# Patient Record
Sex: MALE | Race: WHITE | NOT HISPANIC OR LATINO | Employment: FULL TIME
[De-identification: names, ages, dates, MRNs, and addresses within clinical notes are randomized per-mention and may not be internally consistent; named-entity substitution may affect disease eponyms.]

---

## 2017-08-12 ENCOUNTER — HEALTH MAINTENANCE LETTER (OUTPATIENT)
Age: 37
End: 2017-08-12

## 2018-07-02 ENCOUNTER — APPOINTMENT (OUTPATIENT)
Dept: CT IMAGING | Facility: CLINIC | Age: 38
End: 2018-07-02
Attending: EMERGENCY MEDICINE
Payer: COMMERCIAL

## 2018-07-02 ENCOUNTER — HOSPITAL ENCOUNTER (EMERGENCY)
Facility: CLINIC | Age: 38
Discharge: HOME | End: 2018-07-02
Attending: EMERGENCY MEDICINE | Admitting: EMERGENCY MEDICINE
Payer: COMMERCIAL

## 2018-07-02 VITALS
OXYGEN SATURATION: 96 % | BODY MASS INDEX: 32.93 KG/M2 | DIASTOLIC BLOOD PRESSURE: 89 MMHG | RESPIRATION RATE: 16 BRPM | WEIGHT: 204 LBS | SYSTOLIC BLOOD PRESSURE: 141 MMHG | TEMPERATURE: 98.4 F | HEART RATE: 62 BPM

## 2018-07-02 DIAGNOSIS — S09.90XA CLOSED HEAD INJURY, INITIAL ENCOUNTER: ICD-10-CM

## 2018-07-02 DIAGNOSIS — S13.9XXA NECK SPRAIN, INITIAL ENCOUNTER: ICD-10-CM

## 2018-07-02 PROCEDURE — 99284 EMERGENCY DEPT VISIT MOD MDM: CPT | Mod: 25

## 2018-07-02 PROCEDURE — 70450 CT HEAD/BRAIN W/O DYE: CPT

## 2018-07-02 ASSESSMENT — ENCOUNTER SYMPTOMS
NECK PAIN: 1
NUMBNESS: 0
WOUND: 1
HEADACHES: 1

## 2018-07-02 NOTE — ED PROVIDER NOTES
"  History     Chief Complaint:  Head Injury    HPI   Roseanna Dorsey is a 38 year old female who presents to the emergency department today for evaluation of a head injury. The patient reports that she was in [place] on 6/27/18 and was doing some \"off the beaten path\" hiking, when she slipped an fell, hitting the back of her head. She denies loss of consciousness on impact but reports that she obtained an abrasion to the area. Since then she notes a headache, neck pain exacerbated with movement, and overall tenderness to a bump on the back of her head that has decreased in size since onset. She states that she has been utilizing ibuprofen for her pain, but reports that this dulls but does not completely alleviate the pain. After spending time at work looking at screens, the patient states that her headache worsened, prompting her to present to the ED.  She denies numbness in her fingers.     Allergies:   Penicillins  Sulfa Drugs     Medications:    Albuterol    Past Medical History:    Anemia    Past Surgical History:    Cholecystectomy  Dilation and Curettage Suction     Family History:    Family history reviewed. No pertinent family history.    Social History:  The patient was accompanied to the ED by her child.  Smoking Status: Never Smoker  Smokeless Tobacco: Never Used  Alcohol Use: Negative  Marital Status:        Review of Systems   Musculoskeletal: Positive for neck pain.   Skin: Positive for wound.   Neurological: Positive for headaches. Negative for numbness.        No loss of consciousness   All other systems reviewed and are negative.  Left posterior scalp pain.  Mild neck pain.  No paresthesias in the hands/arms.    Physical Exam     Patient Vitals for the past 24 hrs:   BP Temp Temp src Pulse Resp SpO2 Weight   07/02/18 1922 - - - - 16 - -   07/02/18 1751 141/89 98.4  F (36.9  C) Oral 62 16 96 % 92.5 kg (204 lb)       Physical Exam   HENT:   Head:         GEN: patient smiling, no " distress  HEAD: atraumatic, normocephalic  EYES: pupils reactive, conjunctivae normal  ENT: TMs flat and white bilaterally, oropharynx normal with no erythema or exudate, mucus membranes moist  NECK: no posterior midline tenderness, paraspinous neck cervical tenderness  RESPIRATORY: no tachypnea, breath sounds clear to auscultation (no rales, wheezes, rhonchi), chest wall nontender, normal phonation  CVS: normal S1/S2, no murmurs/rubs/gallops  ABDOMEN: soft, nontender, no masses or organomegaly, no rebound, positive bowel sounds  BACK: no spinal tenderness  EXTREMITIES: intact pulses x 2 (radial pulses intact), no edema  MUSCULOSKELETAL: no deformities  SKIN: warm and dry  NEURO:   Overall symmetrical exam.  Cranial nerves intact.   5/5.  DF and PF 5/5.  Lifts arms over head.  Distal upper extremity sensation intact to PP and LT.  Reflexes 2plus at the elbow.  HEME: no bruising or petechiae/contusions  LYMPH: no lymphadenopathy    Emergency Department Course     Imaging:  Radiology findings were communicated with the patient who voiced understanding of the findings.    CT Head w/o Contrast  Normal head CT.  Radiation dose for this scan was reduced using automated exposure  control, adjustment of the mA and/or kV according to patient size, or  iterative reconstruction technique.    Reading per radiology    Emergency Department Course:    1748 Nursing notes and vitals reviewed.    1805 I performed an exam of the patient as documented above.     1831 The patient was sent for a CT of the head while in the emergency department, results above.     1922 I personally reviewed the imaging results with the patient and answered all related questions prior to discharge.    Discussed results with patient.  Gave patient copies of results (applicable labs, CT scans and/or ultrasound).  Answered questions.  Asked patient to followup with PCP.    /89  Pulse 62  Temp 98.4  F (36.9  C) (Oral)  Resp 16  Wt 92.5 kg (204  "lb)  SpO2 96%  BMI 32.93 kg/m2      Impression & Plan      Medical Decision Making:  This patient presents with a history and clinical exam consistent with a head injury, which is a clinical diagnosis. The differential diagnosis includes skull fracture, epidural hematoma, subdural hematoma, intracerebral hemorrhage, and traumatic subarachnoid hemorrhage; these diagnoses were not detected during this visit on CT imaging.  Despite the normal neuroimaging,  the patient/family understands that they must return if any \"red flags\" appear/develop in the coming hours/days, as this may represent an indication to perform another CT scan.  I have noted that \"red flags\" include: headaches that get worse, increased drowsiness, strange behavior, repetitive speech, seizures, repeated vomiting, growing confusion, increased irritability, slurred speech, weakness or numbness, and loss of responsiveness. Although rare, delayed CNS bleeds can occur, and the patient was notified of this.   I have discussed the second impact syndrome, and the importance of not sustaining repeated concussions in the next 1-2 weeks.  Post concussive syndrome is also discussed.  Concussion information will also be provided in writing at discharge detailing this.    CT is negative for any sort of skull fracture or intracranial hemorrhage. The patient did not have any midline neck tenderness and therefore imaging was not indicated. She understands that she will continue to have headaches, and this can be the natural course of a concussion. She should hydrate and push fluids and use motrin and tylenol as needed. She is given copies of her studies.     No midline spinal cervical tenderness to suggest need for CT imaging.  Patient is neurologically intact.  Plan for ice and hydration.  Concussion precautions.    Diagnosis:    ICD-10-CM    1. Closed head injury, initial encounter S09.90XA    2. Neck sprain, initial encounter S13.9XXA      Disposition:   The " "patient is discharged to home.    Discharge Medications:  No discharge medications.    Instructions to patient:  Return if any \"red flags\" appear/develop in the coming hours/days, as this may represent an indication to perform a CT scan.  \"Red flags\" include: headaches that get worse, increased drowsiness, strange behavior, repetitive speech, seizures, repeated vomiting, growing confusion, increased irritability, slurred speech, weakness or numbness, and loss of responsiveness.      OK motrin or tylenol for headache.    Hydrate and push fluids.    Motrin (ibuprofen) or tylenol (acetaminophen) as needed for pain.    Post concussive syndrome involves headaches, dizziness, personality changes, confusion, tiredness/lethargy.  This is a normal part of the healing process.    Frequent breaks and extra time to complete projects/work/school may be necessary.    Scribe Disclosure:  I, Rhea Eduardo, am serving as a scribe at 5:56 PM on 7/2/2018 to document services personally performed by Maria Del Carmen Fajardo MD based on my observations and the provider's statements to me.    [facility] EMERGENCY DEPARTMENT       Maria Del Carmen Fajardo MD  07/02/18 2200    "

## 2018-07-02 NOTE — ED AVS SNAPSHOT
[facility] Emergency Department    [address] [place] [address]    Phone:  [phone]    Fax:  [phone]                                       Roseanna Dorsey   MRN: [id number]    Department:  [facility] Emergency Department   Date of Visit:  7/2/2018           Patient Information     Date Of Birth          1980        Your diagnoses for this visit were:     Closed head injury, initial encounter     Neck sprain, initial encounter        You were seen by Maria Del Carmen Fajardo MD.      Follow-up Information     Follow up with Wilda Whitfield MD.    Specialty:  OB/Gyn    Contact information:    [address]  [place] [address] [phone]        Discharge References/Attachments     CONCUSSION, COPING WITH (ENGLISH)    CONCUSSION, AFTER (ENGLISH)    HEAD INJURY, NO WAKE-UP (ADULT) (ENGLISH)      24 Hour Appointment Hotline       To make an appointment at any [facility] clinic, call [phone] ([phone]). If you don't have a family doctor or clinic, we will help you find one. [facility] clinics are conveniently located to serve the needs of you and your family.             Review of your medicines      Our records show that you are taking the medicines listed below. If these are incorrect, please call your family doctor or clinic.        Dose / Directions Last dose taken    albuterol 108 (90 Base) MCG/ACT Inhaler   Commonly known as:  PROAIR HFA/PROVENTIL HFA/VENTOLIN HFA   Dose:  2 puff   Quantity:  1 Inhaler        Inhale 2 puffs into the lungs every 6 hours as needed for shortness of breath / dyspnea or wheezing   Refills:  0                Procedures and tests performed during your visit     CT Head w/o Contrast      Orders Needing Specimen Collection     None      Pending Results     Date and Time Order Name Status Description    7/2/2018 1812 CT Head w/o Contrast Preliminary             Pending Culture Results     No orders found  from 6/30/2018 to 7/3/2018.            Pending Results Instructions     If you had any lab results that were not finalized at the time of your Discharge, you can call the ED Lab Result RN at [phone]. You will be contacted by this team for any positive Lab results or changes in treatment. The nurses are available 7 days a week from 10A to 6:30P.  You can leave a message 24 hours per day and they will return your call.        Test Results From Your Hospital Stay        7/2/2018  6:50 PM      Narrative     CT OF THE HEAD WITHOUT CONTRAST 7/2/2018 6:37 PM     COMPARISON: Head CT 4/12/2011.    HISTORY:  Fell backwards hit head, left posterior area with  tenderness.     TECHNIQUE: Axial CT images of the head from the skull base to the  vertex were acquired without IV contrast.    FINDINGS: The ventricles and basal cisterns are within normal limits  in configuration. There is no midline shift. There are no extra-axial  fluid collections.  Gray-white differentiation is well maintained.    No intracranial hemorrhage, mass or recent infarct.    The visualized paranasal sinuses are well-aerated. There is no  mastoiditis. There are no fractures of the visualized bones.        Impression     IMPRESSION:  Normal head CT.    Radiation dose for this scan was reduced using automated exposure  control, adjustment of the mA and/or kV according to patient size, or  iterative reconstruction technique.                  Clinical Quality Measure: Blood Pressure Screening     Your blood pressure was checked while you were in the emergency department today. The last reading we obtained was  BP: 141/89 . Please read the guidelines below about what these numbers mean and what you should do about them.  If your systolic blood pressure (the top number) is less than 120 and your diastolic blood pressure (the bottom number) is less than 80, then your blood pressure is normal. There is nothing more that you need to do about it.  If your  "systolic blood pressure (the top number) is 120-139 or your diastolic blood pressure (the bottom number) is 80-89, your blood pressure may be higher than it should be. You should have your blood pressure rechecked within a year by a primary care provider.  If your systolic blood pressure (the top number) is 140 or greater or your diastolic blood pressure (the bottom number) is 90 or greater, you may have high blood pressure. High blood pressure is treatable, but if left untreated over time it can put you at risk for heart attack, stroke, or kidney failure. You should have your blood pressure rechecked by a primary care provider within the next 4 weeks.  If your provider in the emergency department today gave you specific instructions to follow-up with your doctor or provider even sooner than that, you should follow that instruction and not wait for up to 4 weeks for your follow-up visit.        Thank you for choosing [facility]       Thank you for choosing [facility] for your care. Our goal is always to provide you with excellent care. Hearing back from our patients is one way we can continue to improve our services. Please take a few minutes to complete the written survey that you may receive in the mail after you visit with us. Thank you!        [employer] Information     [employer] lets you send messages to your doctor, view your test results, renew your prescriptions, schedule appointments and more. To sign up, go to [email or web address] . Click on \"Log in\" on the left side of the screen, which will take you to the Welcome page. Then click on \"Sign up Now\" on the right side of the page.     You will be asked to enter the access code listed below, as well as some personal information. Please follow the directions to create your username and password.     Your access code is: [email or web address]  Expires: 2018  7:17 PM     Your access code will  in 90 days. If you need help or a new code, please call your [facility] " [facility] or [phone].        Care EveryWhere ID     This is your Care EveryWhere ID. This could be used by other organizations to access your [facility] medical records  [id number]        Equal Access to Services     FREDERIC LOPEZ : Ailin Jacobo, wapolyda lualyssaadaha, qaybta kaalmada latasharosazoltan, snehal luna. So [facility] [place] [phone].    ATENCIÓN: Si habla español, tiene a moura disposición servicios gratuitos de asistencia lingüística. Llame al [phone].    We comply with applicable federal civil rights laws and [place] laws. We do not discriminate on the basis of race, color, national origin, age, disability, sex, sexual orientation, or gender identity.            After Visit Summary       This is your record. Keep this with you and show to your community pharmacist(s) and doctor(s) at your next visit.

## 2018-07-02 NOTE — ED NOTES
Patient presents for complaint of a head injury sustained on Wednesday while she was on a cruise. States she was on an excursion off the ship when she slipped and struck her head on a rock. Denies LOC at that time. States since then she has been getting headaches and complains of neck pain. Tenderness to lower cervical spine with palpation, denies neurologic complaints. ABC intact, A&Ox4.

## 2018-07-02 NOTE — ED AVS SNAPSHOT
[facility] Emergency Department    [address]    [facility] [address]    Phone:  [phone]    Fax:  [phone]                                       Roseanna Dorsey   MRN: [id number]    Department:  [facility] Emergency Department   Date of Visit:  7/2/2018           After Visit Summary Signature Page     I have received my discharge instructions, and my questions have been answered. I have discussed any challenges I see with this plan with the nurse or doctor.    ..........................................................................................................................................  Patient/Patient Representative Signature      ..........................................................................................................................................  Patient Representative Print Name and Relationship to Patient    ..................................................               ................................................  Date                                            Time    ..........................................................................................................................................  Reviewed by Signature/Title    ...................................................              ..............................................  Date                                                            Time

## 2018-07-03 NOTE — DISCHARGE INSTRUCTIONS
"Return if any \"red flags\" appear/develop in the coming hours/days, as this may represent an indication to perform a CT scan.  \"Red flags\" include: headaches that get worse, increased drowsiness, strange behavior, repetitive speech, seizures, repeated vomiting, growing confusion, increased irritability, slurred speech, weakness or numbness, and loss of responsiveness.      OK motrin or tylenol for headache.    Hydrate and push fluids.    Motrin (ibuprofen) or tylenol (acetaminophen) as needed for pain.    Post concussive syndrome involves headaches, dizziness, personality changes, confusion, tiredness/lethargy.  This is a normal part of the healing process.    Frequent breaks and extra time to complete projects/work/school may be necessary.  "

## 2019-01-29 ENCOUNTER — HOSPITAL ENCOUNTER (EMERGENCY)
Facility: CLINIC | Age: 39
Discharge: HOME | End: 2019-01-29
Attending: PHYSICIAN ASSISTANT | Admitting: PHYSICIAN ASSISTANT
Payer: COMMERCIAL

## 2019-01-29 VITALS
TEMPERATURE: 97.1 F | SYSTOLIC BLOOD PRESSURE: 131 MMHG | BODY MASS INDEX: 31.47 KG/M2 | RESPIRATION RATE: 16 BRPM | HEART RATE: 98 BPM | DIASTOLIC BLOOD PRESSURE: 92 MMHG | OXYGEN SATURATION: 100 % | WEIGHT: 195 LBS

## 2019-01-29 DIAGNOSIS — R11.10 VOMITING AND DIARRHEA: ICD-10-CM

## 2019-01-29 DIAGNOSIS — R19.7 VOMITING AND DIARRHEA: ICD-10-CM

## 2019-01-29 LAB
ALBUMIN SERPL-MCNC: 4.6 G/DL (ref 3.4–5)
ALBUMIN UR-MCNC: NEGATIVE MG/DL
ALP SERPL-CCNC: 77 U/L (ref 40–150)
ALT SERPL W P-5'-P-CCNC: 46 U/L (ref 0–50)
ANION GAP SERPL CALCULATED.3IONS-SCNC: 12 MMOL/L (ref 3–14)
APPEARANCE UR: CLEAR
AST SERPL W P-5'-P-CCNC: 32 U/L (ref 0–45)
BASOPHILS # BLD AUTO: 0.1 10E9/L (ref 0–0.2)
BASOPHILS NFR BLD AUTO: 0.3 %
BILIRUB SERPL-MCNC: 0.7 MG/DL (ref 0.2–1.3)
BILIRUB UR QL STRIP: NEGATIVE
BUN SERPL-MCNC: 19 MG/DL (ref 7–30)
CALCIUM SERPL-MCNC: 9.8 MG/DL (ref 8.5–10.1)
CHLORIDE SERPL-SCNC: 102 MMOL/L (ref 94–109)
CO2 SERPL-SCNC: 22 MMOL/L (ref 20–32)
COLOR UR AUTO: YELLOW
CREAT SERPL-MCNC: 0.92 MG/DL (ref 0.52–1.04)
DIFFERENTIAL METHOD BLD: ABNORMAL
EOSINOPHIL # BLD AUTO: 0.1 10E9/L (ref 0–0.7)
EOSINOPHIL NFR BLD AUTO: 0.7 %
ERYTHROCYTE [DISTWIDTH] IN BLOOD BY AUTOMATED COUNT: 12.1 % (ref 10–15)
GFR SERPL CREATININE-BSD FRML MDRD: 79 ML/MIN/{1.73_M2}
GLUCOSE SERPL-MCNC: 130 MG/DL (ref 70–99)
GLUCOSE UR STRIP-MCNC: NEGATIVE MG/DL
HCT VFR BLD AUTO: 47.6 % (ref 35–47)
HGB BLD-MCNC: 15.9 G/DL (ref 11.7–15.7)
HGB UR QL STRIP: NEGATIVE
IMM GRANULOCYTES # BLD: 0.1 10E9/L (ref 0–0.4)
IMM GRANULOCYTES NFR BLD: 0.3 %
KETONES UR STRIP-MCNC: NEGATIVE MG/DL
LEUKOCYTE ESTERASE UR QL STRIP: NEGATIVE
LYMPHOCYTES # BLD AUTO: 0.5 10E9/L (ref 0.8–5.3)
LYMPHOCYTES NFR BLD AUTO: 3 %
MCH RBC QN AUTO: 29 PG (ref 26.5–33)
MCHC RBC AUTO-ENTMCNC: 33.4 G/DL (ref 31.5–36.5)
MCV RBC AUTO: 87 FL (ref 78–100)
MONOCYTES # BLD AUTO: 0.8 10E9/L (ref 0–1.3)
MONOCYTES NFR BLD AUTO: 5.2 %
MUCOUS THREADS #/AREA URNS LPF: PRESENT /LPF
NEUTROPHILS # BLD AUTO: 14 10E9/L (ref 1.6–8.3)
NEUTROPHILS NFR BLD AUTO: 90.5 %
NITRATE UR QL: NEGATIVE
NRBC # BLD AUTO: 0 10*3/UL
NRBC BLD AUTO-RTO: 0 /100
PH UR STRIP: 5 PH (ref 5–7)
PLATELET # BLD AUTO: 327 10E9/L (ref 150–450)
POTASSIUM SERPL-SCNC: 4.7 MMOL/L (ref 3.4–5.3)
PROT SERPL-MCNC: 9.5 G/DL (ref 6.8–8.8)
RBC # BLD AUTO: 5.48 10E12/L (ref 3.8–5.2)
RBC #/AREA URNS AUTO: <1 /HPF (ref 0–2)
SODIUM SERPL-SCNC: 136 MMOL/L (ref 133–144)
SOURCE: ABNORMAL
SP GR UR STRIP: 1.02 (ref 1–1.03)
SQUAMOUS #/AREA URNS AUTO: 1 /HPF (ref 0–1)
UROBILINOGEN UR STRIP-MCNC: 0 MG/DL (ref 0–2)
WBC # BLD AUTO: 15.5 10E9/L (ref 4–11)
WBC #/AREA URNS AUTO: 1 /HPF (ref 0–5)

## 2019-01-29 PROCEDURE — 81001 URINALYSIS AUTO W/SCOPE: CPT | Performed by: PHYSICIAN ASSISTANT

## 2019-01-29 PROCEDURE — 96361 HYDRATE IV INFUSION ADD-ON: CPT

## 2019-01-29 PROCEDURE — 85025 COMPLETE CBC W/AUTO DIFF WBC: CPT | Performed by: PHYSICIAN ASSISTANT

## 2019-01-29 PROCEDURE — 99284 EMERGENCY DEPT VISIT MOD MDM: CPT | Mod: 25

## 2019-01-29 PROCEDURE — 80053 COMPREHEN METABOLIC PANEL: CPT | Performed by: PHYSICIAN ASSISTANT

## 2019-01-29 PROCEDURE — 25000128 H RX IP 250 OP 636: Performed by: PHYSICIAN ASSISTANT

## 2019-01-29 PROCEDURE — 96374 THER/PROPH/DIAG INJ IV PUSH: CPT

## 2019-01-29 RX ORDER — ONDANSETRON 4 MG/1
4 TABLET, ORALLY DISINTEGRATING ORAL EVERY 8 HOURS PRN
Qty: 10 TABLET | Refills: 0 | Status: SHIPPED | OUTPATIENT
Start: 2019-01-29 | End: 2019-02-01

## 2019-01-29 RX ORDER — ONDANSETRON 2 MG/ML
8 INJECTION INTRAMUSCULAR; INTRAVENOUS ONCE
Status: COMPLETED | OUTPATIENT
Start: 2019-01-29 | End: 2019-01-29

## 2019-01-29 RX ADMIN — SODIUM CHLORIDE 1000 ML: 9 INJECTION, SOLUTION INTRAVENOUS at 12:05

## 2019-01-29 RX ADMIN — ONDANSETRON 8 MG: 2 INJECTION INTRAMUSCULAR; INTRAVENOUS at 12:06

## 2019-01-29 ASSESSMENT — ENCOUNTER SYMPTOMS
LIGHT-HEADEDNESS: 1
DYSURIA: 0
DIFFICULTY URINATING: 0
HEMATURIA: 0
DIZZINESS: 1
DIARRHEA: 1
FREQUENCY: 0
ABDOMINAL PAIN: 0
NAUSEA: 1
VOMITING: 1

## 2019-01-29 NOTE — ED NOTES
Patient denies nausea or vomiting since medications given. Patient complains of chills and was given warm blankets and given ice chips upon request.

## 2019-01-29 NOTE — ED AVS SNAPSHOT
[facility] Emergency Department  [address]  [facility] [address]  Phone:  [phone]  Fax:  [phone]                                    Roseanna Dorsey   MRN: [id number]    Department:  [facility] Emergency Department   Date of Visit:  1/29/2019           After Visit Summary Signature Page    I have received my discharge instructions, and my questions have been answered. I have discussed any challenges I see with this plan with the nurse or doctor.    ..........................................................................................................................................  Patient/Patient Representative Signature      ..........................................................................................................................................  Patient Representative Print Name and Relationship to Patient    ..................................................               ................................................  Date                                   Time    ..........................................................................................................................................  Reviewed by Signature/Title    ...................................................              ..............................................  Date                                               Time          22EPIC Rev 08/18

## 2019-01-29 NOTE — ED PROVIDER NOTES
History     Chief Complaint:  Nausea, Vomiting & Diarrhea    HPI   Roseanna Dorsey is a 38 year old female who presents with nausea, vomiting and diarrhea. The patient reports that this morning she started to feel nauseous and has had multiple episodes of vomiting and diarrhea since then. The patient additionally notes that she now has started to feel dizzy and lightheaded. The patient denies any abdominal pain, blood in her vomit or stool or any urinary symptoms. Of note, the patient states that her children have been sick with similar symptoms for the past 5 days.     Allergies:  Penicillins  Sulfa Drugs     Medications:    Albuterol Inhaler    Past Medical History:    Anemia    Past Surgical History:    Cholecystectomy  Dilation and Curettage Suction, Treat Incomplete     Family History:    History reviewed. No pertinent family history.     Social History:  Smoking Status: Never Smoker  Alcohol Use: No  Patient presents with friend.family.  Marital Status:       Review of Systems   Gastrointestinal: Positive for diarrhea, nausea and vomiting. Negative for abdominal pain.   Genitourinary: Negative for difficulty urinating, dysuria, frequency, hematuria and urgency.   Neurological: Positive for dizziness and light-headedness.   All other systems reviewed and are negative.    Physical Exam     Patient Vitals for the past 24 hrs:   BP Temp Temp src Pulse Heart Rate Resp SpO2 Weight   19 1415 (!) 131/92 -- -- 98 -- -- 99 % --   19 1400 130/84 -- -- 92 -- -- 96 % --   19 1330 129/86 -- -- 97 -- -- 96 % --   19 1300 112/74 -- -- 81 -- -- 94 % --   19 1245 131/83 -- -- 91 -- -- 93 % --   19 1230 130/89 -- -- 58 -- -- 98 % --   19 1215 120/90 -- -- 71 -- -- -- --   19 1130 130/87 -- -- -- -- -- -- --   19 1114 (!) 115/93 97.1  F (36.2  C) Temporal -- 106 16 98 % 88.5 kg (195 lb)     Physical Exam  Constitutional: ill appearing  Head: No  external signs of trauma noted to head or face.   Eyes: Pupils are equal, round, and reactive to light. no scleral icterus.   ENT: MMM. Normal voice.   Neck: Normal ROM.    Cardiovascular: mild tachycardia, regular rhythm, and intact distal pulses.    Respiratory: Effort normal. No respiratory distress. Lungs clear to auscultation bilaterally.   GI: Soft. There is no tenderness. There is no rebound or guarding.   Musculoskeletal: No deformities appreciated. Normal ROM. No edema noted.  Neurological: Alert and Oriented x 3. Speech normal. Moves all extremities equally.  Psychiatric: Appropriate mood, affect, and behavior.   Skin: Skin is warm and dry. Pale appearing.     Emergency Department Course     Laboratory:    CMP: Glucose 130 (H), Protein Total 9.5 (H), o/w AWNL (Creatinine 0.92)    CBC: WBC 15.5 (H), HGB 15.9 (H), RBC 5.48 (H), HCT 47.6 (H), o/w AWNL ()    UA: Mucous Urine Present (A), o/w Negative    Interventions:    1205: NS 1L IV Bolus  1206: Zofran 8 mg IV  NS 1L IV Bolus    Emergency Department Course:  Past medical records, nursing notes, and vitals reviewed.  1133: I performed an exam of the patient and obtained history, as documented above.    IV inserted and blood drawn.     1518: I rechecked the patient. Findings and plan explained to the Patient. Patient discharged home with instructions regarding supportive care, medications, and reasons to return. The importance of close follow-up was reviewed.     Impression & Plan      Medical Decision Making:  Roseanna Dorsey is a 38 year old female who presents with acute nausea, vomiting and diarrhea. She has a benign abdominal exam without focal RLQ or LLQ tenderness to suggest diverticulitis or appendicitis, respectively, or other acute abdominal process. I did discuss the sometimes vague initial constellation of symptoms early in the course of acute abdominal processes, and the need for immediate return should pain or other concerning  symptoms develop.  Symptoms are improved after IV fluids and symptomatic treatment.  The patient is not pregnant and there is no evidence of urinary tract infection. There has been no travel or antibiotic exposure to suggest more concerning cause of diarrhea, and there has been no hematemesis or BRBPR/melena.  Vital signs have remained normal and stable throughout the ED course, and the abdominal re-exam remains benign. She is tolerating PO now. I believe she is safe for discharge in improved condition at this time with strict return precautions for recurrent vomiting, pain, fever or any other concerning symptoms.    Diagnosis:    ICD-10-CM    1. Vomiting and diarrhea R11.10     R19.7        Disposition:  Discharged to home.    Discharge Medications:     Medication List      Started    ondansetron 4 MG ODT tab  Commonly known as:  ZOFRAN ODT  4 mg, Oral, EVERY 8 HOURS PRN              Anupama Villavicencio  1/29/2019   [facility] EMERGENCY DEPARTMENT  Anupama MORA, am serving as a scribe at 11:33 AM on 1/29/2019 to document services personally performed by Adamaris Valenzuela PA-C based on my observations and the provider's statements to me.        Adamaris Valenzuela PA-C  01/29/19 1748

## 2019-01-29 NOTE — ED TRIAGE NOTES
Patient presents with nausea, vomiting and diarrhea since this morning. Patient hasn't been able to keep anything down. ABCDs intact, alert and oriented x 4.

## 2019-10-28 ENCOUNTER — PATIENT OUTREACH (OUTPATIENT)
Dept: PLASTIC SURGERY | Facility: CLINIC | Age: 39
End: 2019-10-28

## 2019-10-28 DIAGNOSIS — F64.0 GENDER DYSPHORIA IN ADOLESCENT AND ADULT: Primary | ICD-10-CM

## 2019-10-28 NOTE — PROGRESS NOTES
[facility]:  Care Coordination Note     SITUATION   Patient (Bec, She/Her or They/Them) is a 39 year old who is receiving support for:  Clinic Care Coordination - Initial (new CGC Pt) and New Patient  .    BACKGROUND     New [facility] pt requesting top surgery, pt referred by friend and former Noam patient (Ani).     ASSESSMENT     Surgery              [facility] Assessment  Comprehensive Gender Care ([facility]) Enrollment: Enrolled(Not on hormones)  Patient has a therapist: Yes  Name of therapist: Concetta Sanford  Letter of support #1: Requested  Surgery being considered: Yes  Mastectomy: Yes          PLAN          Nursing Interventions:   [facility] program and services discussed with patient. CGC referral placed. [facility] assessment completed. Process for accessing surgery discussed including: [facility]PATH standards of care, Letters of support, [place] insurance process, surgery scheduling, and approximate timeline. Pt questions answered. Registration completed & reviewed; scheduling process discussed & completed, as necessary.     More than 50% of the time was used to educate patient on medical & surgical process.     Follow-up plan:  Pt scheduled for consult on 12/10/19 with Noam.        Lucas Tobar

## 2019-10-29 ENCOUNTER — PRE VISIT (OUTPATIENT)
Dept: SURGERY | Facility: CLINIC | Age: 39
End: 2019-10-29

## 2019-10-29 NOTE — TELEPHONE ENCOUNTER
FUTURE VISIT INFORMATION      FUTURE VISIT INFORMATION:    Date: 12/10/19    Time: 11:00am    Location: [facility]  REFERRAL INFORMATION:    Referring provider:  self    Referring providers clinic:  N/A    Reason for visit/diagnosis  top consult    RECORDS REQUESTED FROM:       No recs to collect

## 2019-10-31 ENCOUNTER — TELEPHONE (OUTPATIENT)
Dept: OTHER | Facility: OUTPATIENT CENTER | Age: 39
End: 2019-10-31

## 2019-10-31 NOTE — TELEPHONE ENCOUNTER
.Per: CIARA richard/ [place]IV[place]  Copay$ 20.00   Ded$ 1,250; Met$ 0   Coins 0%    Out of Pocket Max$ 4,000 ; Met$ 898.00     Psych testing require auth (66948/94587)? no  Extended therapy require auth (01323)?  no    Exclusions:   Family Therapy (35991/68770)  NO    Marriage couple counseling  YES   Transgender/Gender Dysphoria no   CSB no   Sexual dysfunction no    Patient Contacted about benefits:  LEFT VOICEMAIL  Date contacted: 10/31/2019

## 2019-10-31 NOTE — TELEPHONE ENCOUNTER
[facility] Telephone Intake    Date:  2019  Client Name:  Roseanna Dorsey    MRN:  [id number]   :  1980       Age:  39 year old     Presenting Problem / Reason for Assessment   (Clinical History &Symptoms):     Pt referred to [facility] for LOS for top surgery w Dr. Santacruz's office    Suggested Program: TG    Is presenting concern primarily sexual or mental health:      Follow Up:    Insurance Benefits to be evaluated.  Note will be entered when validated.    Patient wishes to be contacted regarding Insurance benefits: YES    Please Verify Registration    Please send Welcome Packet and document date sent.

## 2019-11-06 ENCOUNTER — HEALTH MAINTENANCE LETTER (OUTPATIENT)
Age: 39
End: 2019-11-06

## 2019-11-08 ENCOUNTER — OFFICE VISIT (OUTPATIENT)
Dept: OTHER | Facility: OUTPATIENT CENTER | Age: 39
End: 2019-11-08
Payer: COMMERCIAL

## 2019-11-08 DIAGNOSIS — F64.0 GENDER DYSPHORIA IN ADULT: Primary | ICD-10-CM

## 2019-11-08 NOTE — PROGRESS NOTES
"[facility]  Program in Human Sexuality  Department of Family Medicine & Community Health  [employer] [facility] [employer]   [address]               [place] [address]  Phone: [phone]  Fax: [phone]  [email or web address]       Diagnostic Assessment Interview      Date of Service: 11/08/19  Client Name: Roseanna Dorsey  Preferred name: Genna; pronouns she/they   YOB: 1980  39 year old  MRN:  [id number]  Gender/Gender Identity: Transmasculine  Treating Provider: Darline Bailon, PhD - Postdoctoral fellow  Program:  [facility]  Type of Session: Assessment  Present in Session: Client  Number of Minutes:  55                         Ethnicity:       Any relevant cultural/Moravian issues? None identified     Clinician introduced self and trainee status/supervisor. Clinician reviewed confidentiality and limits thereof. Clinician reviewed diagnostic assessment and treatment planning process. Client verbalized understanding of all information reviewed.    Referral Source: Recommendation from primary mental health therapist    Chief Complaint/Presenting Problem and Goals:    Genna is a 39 year old transmasculine person assigned female at birth. They presented to [facility] requesting letter of support for top surgery, as well as to receive specific support around transition in addition to their primary mental health therapy. ________________________________________________________________    Transgender Health Services  Program-Specific Information    History of gender dysphoria     Bec reported that they began seeing an individual therapist in June 2018 around broad issues of identity and self-confidence. Bec reported that when they initially came out as a lesbian at age 21, their family and college community was non-supportive, and \"life tipped pretty quickly.\" Bec reported that they spent most of their adult life having to defend themselves and their " "identity, and as a result, \"lost the ability to listen to what they were feeling.\" Bec reported that their individual therapy has focused on \"unpacking\" internalized shame that built up and creating safety in exploring who they really are.     Bec reported that through therapy, they began using language of \"discomfort\" with their body, until about two months ago when they acknowledged that they were not \"uncomfortable\" with their body, but were transgender and experiencing significant dysphoria. Bec reported that since they allowed themselves to explore their gender, they recognize that dysphoria has been a \"constant\" in their life. They reported never feeling comfortable with their chest, \"hating\" it, and presenting masculine any time they were given the choice. They recalled having arguments every day with their mother about their choice of clothing, and about \"not living up to the expectations of a daughter.\" They reported wanting to hide their body through loose clothing. They reported not having the language for this until they began therapy last summer. They reported recognizing now that the level of distress they felt over their body is \"not what cis women feel.\"     They reported that they grew up in a culturally, religiously, and emotionally constrained environment, and thus it was not possible to think about gender identity. They reported that they were \"always considered one of the boys,\" and were breaking the \"gender rules\" that their parents and home community had. However, they didn't know how to \"internally\" think about gender.    Bec reported that they have explored identities between the gender binary through their individual therapy. They reported initially trying to put themselves in a \"non-binary area,\" but quickly recognizing that there is \"nothing nonbinary or fluid\" about the way they feel or present.      Body Image/Anatomical dysphoria:  Bec reported significant chest dysphoria. They " "reported that working out and playing sports has been very difficulty to navigate, and that they \"never wanted anyone to know that breasts were under there.\" Bec reported some dysphoria with menstruation. They reported that puberty was \"terrible.\" Their desired change at this time is top surgery. Bec reported that they are not certain about wanting any further medical intervention, such as HRT or additional surgery, and want to assess how they feel post top-surgery.    Social Supports:   Genna reported that they have a very supportive wife and large [place] of friends and chosen family. Genna has some friends and acquaintances who have transitioned. Their spouse works with a treatment program in [facility] that serves the LGBT community, and Genna has reached out to some of their wife's colleagues about their transition. Their family is well connected to and supported by the local LGBT community.     Genna has some concerns related to employment, as their work takes them out of state [place] parts of the country that are very conservative.    Relevant Sexuality Information:    Genna reported a history of discomfort with being sexual due to chest dysphoria. They reported that since coming out to their wife, this is less of an issue.    Mental Health History     Genna has been in individual therapy with Concetta Salazar with [facility] since June 2018. Diagnoses, to their knowledge, are Gender Dysphoria and possibly a trauma diagnosis. Bec reported that the trauma diagnosis relates to the trauma associated with their initial coming out experience, and related internalized shame.    Bec reported a history of depression and suicidal ideation when they first came out at 21. They reported that much of this has been relieved.    Bec reported no history of psychiatric hospitalization, suicidal attempt or behavior, and no history of psychiatric medication.     Substance Use:     Bec reported current alcohol use of a few drinks once per " month, with no reported history of heavier use. They denied history of other substance use.     Medical History    Genna receives their primary care from Wilda Whitfield MD in [place], [place]. They receive OB/GYN care from Robert Ma MD.     Genna delivered their two children through vaginal delivery, and had one miscarriage.   They reported gallbladder removal age 21.     No other significant history of illness or injury.    Relationships/Social History    Genna met their wife 14 years ago, and they  11 years ago. They have two children. They reported that this relationship is very supportive.      Family History     Bec grew up in [place] with their adoptive family. They were adopted at one day old through a closed adoption. They have no information on biological family history.     Genna grew up in a Buddhist family. Adoptive father is 72, retired from public health. They reported that they had a positive relationship growing up. Adoptive mother is 72 and a retired dietician. They reported a disconnected relationship throughout Genna's life. They have a brother 10 years older (child of adoptive father), and a younger brother (child of adoptive mother and father).    Genna has two children, ages 8 and 6, with their spouse Juana. They reported very positive family relationships, with much family involvement in the local LGBT community.     Educational History     Genna has a Bachelor's Degree in Communications from [employer] (2003).     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 They reported that this job is intellectually, emotionally, physically, and financially satisfying.      Legal Issues    None reported.    CONCLUSIONS    Strengths and Liabilities:     Self-reported strengths: smart, curious, authentic, funny, kind, compassionate, surrounded by love, tons of love for family  Self reported weaknesses include: shame, sometimes letting  fear win, not listening to themselves for a while    Symptoms:    Discomfort with birth-assigned sex and desire to live and be perceived as gender other than sex assigned at birth.   Significant body dysphoria  Symptoms have persisted for longer than six months and cause significant distress    Mental Status   Appearance:  Neat, well groomed  Behavior/relationship to examiner/demeanor:  Cooperative, engaged, pleasant  Orientation: Oriented to person, place, time, situation  Speech Rate:  normal  Speech Spontaneity:  normal  Mood:  euthymic  Affect:  congruent  Thought Process (Associations): linear, goal directed  Thought Content:  No delusion/hallucination, no rumination, no SI/HI  Abnormal Perception: None reported or observed  Attention/Concentration:  normal  Language:  intact  Insight:  good  Judgment: good     DSM-5 Diagnosis:    302.85 Gender Dysphoria in Adolescents and Adults     Conclusions/Recommendations/Initial Treatment Goals:     The client is a 39 year old American person assigned female at birth who identifies as transmasculine. Based on the client's current report of symptoms, client meets criteria for 302.85 Gender Dysphoria in Adolescents and Adults. The client's dysphoria has been causing clinically significant distress. The client reports no significant mental health or substance use concerns. They do not report significant psychosocial stressors at this time. Client denies safety concerns. Based on the client's reported symptoms and impact on functioning, the plan for the patient is:  1. Start supportive individual therapy with a provider specialized in gender dysphoria. Therapy should focus on supporting and navigating medical and social transition, and work in collaboration with primary mental health therapy  2. Consider family therapy to support client as they come out to children and transition at home.       Darline Bailon, PhD - Postdoctoral Fellow

## 2019-11-12 ENCOUNTER — OFFICE VISIT (OUTPATIENT)
Dept: OTHER | Facility: OUTPATIENT CENTER | Age: 39
End: 2019-11-12
Payer: COMMERCIAL

## 2019-11-12 DIAGNOSIS — F64.0 GENDER DYSPHORIA IN ADULT: Primary | ICD-10-CM

## 2019-12-10 ENCOUNTER — OFFICE VISIT (OUTPATIENT)
Dept: PLASTIC SURGERY | Facility: CLINIC | Age: 39
End: 2019-12-10
Payer: COMMERCIAL

## 2019-12-10 VITALS — WEIGHT: 195 LBS | BODY MASS INDEX: 31.34 KG/M2 | HEIGHT: 66 IN

## 2019-12-10 DIAGNOSIS — F64.0 GENDER DYSPHORIA IN ADOLESCENT AND ADULT: Primary | ICD-10-CM

## 2019-12-10 ASSESSMENT — PAIN SCALES - GENERAL: PAINLEVEL: NO PAIN (0)

## 2019-12-10 ASSESSMENT — MIFFLIN-ST. JEOR: SCORE: 1586.26

## 2019-12-10 NOTE — PROGRESS NOTES
"PLASTICS NEW TOP   HPI: This is a 39 year old biological female who identifies as trans masculine with a history of gender dysphoria who presents today with their wife, Juana, for a consultation for top surgery. They do not have preferred pronouns and preferred name is Shakir. They were referred by friends of [facility] and made their appointment 2 months ago - they got in early due to a cancellation in my schedule. At this time, Shakir sees two therapists: Concetta Tang - Shakir has been seeing her for almost two years, and Darline Bailon at [facility] - this person is in process of writing a letter of support for Shakir. They are not on testosterone. They have been living their chosen gender identity/role for 2 months - Shakir came out to their parents at age 21, but her gender identity was suppressed until it was rediscovered during therapy. Shakir does not bind but does wear sports bras. They used to bind but it was painful for them. No breast lumps, skin puckering, nipple drainage, or other breast problems. No history of breast imaging, including mammogram or breast ultrasound.     Medical Hx: Gender dysphoria. No history of asthma, diabetes mellitus, or GERD. No bleeding, clotting, healing or scarring problems. No anxiety or depression.     Surgical Hx: Lap cholecystectomy at age 21. Third molar extractions. Vaginal deliveries x 2.    Family Hx: Shakir is adopted and does not know of her biological family's medical history.     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 Relationship status/family:  to Juana. Has two children, ages 8 & 6. Smoking status: Non-smoker. Alcohol use: Rare social intake. Diet: Omnivore. Caffeine: 1 espresso per day. Rare soda. Exercise: Group fitness classes 2x per week (kickboxing). Sleep: Shakir averages 7-8 hours of sleep per night. No insomnia.     Vitals: Ht 1.676 m (5' 6\")   Wt 88.5 kg (195 lb)   " "BMI 31.47 kg/m    PE: General: Height: 5' 6\" Weight: 195 lbs 0 oz (both stated).  Chest: Nice upper chest contour. Slight depression over central sternum. Breasts are fairly close at the midline. Grade 3 ptosis. Left breast is larger than the right by roughly 200 grams, and both breasts are at least 800 grams in size. Some lipodystrophy in bilateral axillary folds. Faint striae noted on bilateral breasts. Moderate lateral thoracic rolls. Some striae noted on abdomen from previous pregnancies. Left IMF is roughly 1 cm lower than the right and IMF's are approximately 4-5 cm low. Relatively short chest. Small scar noted in right upper medial corner. Fairly fibrous breast tissue. No lymphadenopathy or palpable masses.   Photos taken with consent.     A&P: 39 year old biological female who identifies as trans masculine who is a good candidate for gender affirming top surgery with one of the following: bilateral simple mastectomy with possible nipple graft reconstruction vs. subcutaneous mastectomy with possible nipple graft reconstruction vs. breast reduction with possible nipple graft reconstruction. Most likely, they will need bilateral simple mastectomy with possible nipple graft reconstruction, depending on intraoperative findings and the patient's desired outcome. At this time, Shakir is interested in nipple grafts. The patient will need a pre-op mammogram in addition to an H&P from their PCP. Their letter of support will need to be finished and sent to us by Shakir's therapist. Shakir hopes to have surgery after April 2020, as they are taking a spring break trip at the end of March 2020.     They also met with our Transgender Coordinator to discuss communications with staff and timeline. Any further discussion of risks and complications will be reviewed during the pre-op visit.   Once we receive their therapist letter of support and mammogram results we will initiate the prior authorization process.     According to " [place] Case Law and [facility] standards of care with an appropriate letter of support form a mental health provider top surgery/mastectomy is medically necessary for the treatment of gender dysphoria.     Total time = 30 minutes, over half of which spent discussing surgical options    This note was prepared on behalf of Holli Santacruz MD, by Lizette De La Cruz, a trained medical scribe, based on my observations and the provider's statements to me.

## 2019-12-10 NOTE — LETTER
12/10/2019       RE: Roseanna Dorsey  [address] [address]     Dear Colleague,    Thank you for referring your patient, Roseanna Dorsey, to the [facility] at [facility]. Please see a copy of my visit note below.    PLASTICS NEW TOP   HPI: This is a 39 year old biological female who identifies as trans masculine with a history of gender dysphoria who presents today with their wife, Juana, for a consultation for top surgery. They do not have preferred pronouns and preferred name is Shakir. They were referred by friends of [facility] and made their appointment 2 months ago - they got in early due to a cancellation in my schedule. At this time, Shakir sees two therapists: Concetta Tang - Shakir has been seeing her for almost two years, and Darline Bailon at [facility] - this person is in process of writing a letter of support for Shakir. They are not on testosterone. They have been living their chosen gender identity/role for 2 months - Shakir came out to their parents at age 21, but her gender identity was suppressed until it was rediscovered during therapy. Shakir does not bind but does wear sports bras. They used to bind but it was painful for them. No breast lumps, skin puckering, nipple drainage, or other breast problems. No history of breast imaging, including mammogram or breast ultrasound.     Medical Hx: Gender dysphoria. No history of asthma, diabetes mellitus, or GERD. No bleeding, clotting, healing or scarring problems. No anxiety or depression.     Surgical Hx: Lap cholecystectomy at age 21. Third molar extractions. Vaginal deliveries x 2.    Family Hx: Shakir is adopted and does not know of her biological family's medical history.     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 Relationship status/family:  to  "Juana. Has two children, ages 8 & 6. Smoking status: Non-smoker. Alcohol use: Rare social intake. Diet: Omnivore. Caffeine: 1 espresso per day. Rare soda. Exercise: Group fitness classes 2x per week (kickboxing). Sleep: Shakir averages 7-8 hours of sleep per night. No insomnia.     Vitals: Ht 1.676 m (5' 6\")   Wt 88.5 kg (195 lb)   BMI 31.47 kg/m     PE: General: Height: 5' 6\" Weight: 195 lbs 0 oz (both stated).  Chest: Nice upper chest contour. Slight depression over central sternum. Breasts are fairly close at the midline. Grade 3 ptosis. Left breast is larger than the right by roughly 200 grams, and both breasts are at least 800 grams in size. Some lipodystrophy in bilateral axillary folds. Faint striae noted on bilateral breasts. Moderate lateral thoracic rolls. Some striae noted on abdomen from previous pregnancies. Left IMF is roughly 1 cm lower than the right and IMF's are approximately 4-5 cm low. Relatively short chest. Small scar noted in right upper medial corner. Fairly fibrous breast tissue. No lymphadenopathy or palpable masses.   Photos taken with consent.     A&P: 39 year old biological female who identifies as trans masculine who is a good candidate for gender affirming top surgery with one of the following: bilateral simple mastectomy with possible nipple graft reconstruction vs. subcutaneous mastectomy with possible nipple graft reconstruction vs. breast reduction with possible nipple graft reconstruction. Most likely, they will need bilateral simple mastectomy with possible nipple graft reconstruction, depending on intraoperative findings and the patient's desired outcome. At this time, Shakir is interested in nipple grafts. The patient will need a pre-op mammogram in addition to an H&P from their PCP. Their letter of support will need to be finished and sent to us by Shakir's therapist. Shakir hopes to have surgery after April 2020, as they are taking a spring break trip at the end of March 2020. "     They also met with our Transgender Coordinator to discuss communications with staff and timeline. Any further discussion of risks and complications will be reviewed during the pre-op visit.   Once we receive their therapist letter of support and mammogram results we will initiate the prior authorization process.     According to [place] Case Law and [facility] standards of care with an appropriate letter of support form a mental health provider top surgery/mastectomy is medically necessary for the treatment of gender dysphoria.     Total time = 30 minutes, over half of which spent discussing surgical options    This note was prepared on behalf of Holli Santacruz MD, by Lizette De La Cruz, a trained medical scribe, based on my observations and the provider's statements to me.     Again, thank you for allowing me to participate in the care of your patient.      Sincerely,    Holli Santacruz MD

## 2019-12-10 NOTE — NURSING NOTE
"Chief Complaint   Patient presents with     Consult     Pt here for top surgery consult       Vitals:    12/10/19 1136   Weight: 88.5 kg (195 lb)   Height: 1.676 m (5' 6\")       Body mass index is 31.47 kg/m .      ROSE BreenT                    No vitals taken per provider  "

## 2019-12-11 NOTE — PROGRESS NOTES
"[facility] -  Case Progress Note    Date of Service: 19   Name: Roseanna Dorsey - \"Bec\"  : 1980  Medical Record Number: [id number]  Treating Provider: Darline Bailon, PhD - postdoctoral fellow  Type of Session: Individual  Present in Session: Client  Number of Minutes:  55    Current Symptoms/Status:  Dysphoria: Discomfort with birth-assigned sex and desire to live and be perceived as gender other than sex assigned at birth.   Chest dysphoria is primary and client is pursuing top surgery.     Progress Toward Treatment Goals:   Identification of treatment goals    Intervention: Modality and Description:  Collaborative and strengths-based approached to development of initial treatment goals. Bec noted that they will continue primary mental health treatment and visit with a gender specialist throughout the process of transition for support in navigating medical transition and social transition at home. Bec was in agreement with diagnosis given and reported no questions about treatment plan.  Bec is requesting letter of support for top surgery. Discussed knowledge of surgical procedure, expectations of surgical outcome, and plan for aftercare support. Bec demonstrated a thorough knowledge of the information reviewed and has developed a comprehensive aftercare plan with input from others who have been through this surgery.   They reported that a primary area for ongoing support is navigating social transition at home with their young children. They requested any resources to help them and their spouse have conversations with children throughout their transition. Bec presented as open, engaged, and reflective.     Assignment:  None      DSM-5 Diagnoses:    302.85 Gender Dysphoria in Adolescents and Adults     Plan/Need for Future Services:  Return for therapy in two weeks to complete LOS collaboratively      Darline Bailon, PhD - Postdoctoral Fellow  "

## 2019-12-13 NOTE — PROGRESS NOTES
Roseanna is a 39 year old  adult who presents for annual exam.     Besides routine health maintenance,  Shakir Law Nik Dorsey would like to discuss Upcoming breast surgey and a couple of other things.    HPI:  The patient's PCP is  Wilda Whitfield MD.      Haven't seen the patient in almost 5-6 yrs. Just got busy with kids and work and never got back in. Patient is still with her wife William and Jamshid is 9 and Octavia is 6 and doing great.  Patient came out as a lesbian to her family years ago and it did not go well and then just basically went in to defensive mode. Not until the last year or so has counseling and a lot of soul searching led to the fact that has never felt comfortable in his body and hates his breasts and has started to live as a man the last 2 months. Has been seeing 2 therapists and is connected to the gender dysphoria clinic at [facility]. Just saw Dr. Santacruz and is wanting to have top surgery as soon as possible.  Still not sure if will ever have bottom surgery or a hysterectomy. However having a period is something that is so mentally challenging for him that really wants to think of something that would help make the menses stop if possible. Isn't quite ready to make a decision on a hyst with or without ovarian removal. Periods are regular, not overly heavy and last 4 days. Definitely has PMS with bloating and breast pain and moodiness, etc.    Patient needs to have a screening mammo done before can submit to insurance approval for the top surgery so would like that done asap. Also hasn't had any fasting labs in years. H.o mild asthma with occasional albuterol use in the past but hasn't needed it recently.    Her family has been much more accepting of her being with Juana and accepting of Juana but nothing more has really ever been discussed about gender transitioning. Have been very open with the kids about different things. Haven't completely given details of gender  reassignment to them but they just seem to be really open and understanding and getting it in their age appropriate way.    GYNECOLOGIC HISTORY:    Patient's last menstrual period was 2019 (exact date).    Regular menses? yes  Menses every 28/30 days.  Length of menses: 4 days    Her current contraception method is: none.  She  reports that Shakir Dorsey has never smoked. Shakir Dorsey has never used smokeless tobacco.    Patient is sexually active.  STD testing offered?  Declined  Last PHQ-9 score on record =   PHQ-9 SCORE 2019   PHQ-9 Total Score 1     Last GAD7 score on record =   WENDI-7 SCORE 2019   Total Score 0     Alcohol Score = 1    HEALTH MAINTENANCE:  Cholesterol: (No results found for: CHOL   Last Mammo: Not applicable, Result: Not applicable, Next Mammo: Due at age 42.  Pap: (No results found for: PAP )    Colonoscopy:  NA, Result: Not applicable, Next Colonoscopy: 13 years.  Dexa:  NA    Health maintenance updated:  yes    HISTORY:  OB History    Para Term  AB Living   3 2 2 0 1 2   SAB TAB Ectopic Multiple Live Births   1 0 0 0 2      # Outcome Date GA Lbr Lopez/2nd Weight Sex Delivery Anes PTL Lv   3 Term 13 38w5d 04:51 / 00:17 3.39 kg (7 lb 7.6 oz) F Vag-Spont EPI N BOSSMAN      Birth Comments: followed and delivered by Roseann      Name: Octavia      Apgar1: 8  Apgar5: 9   2 SAB 10/16/12 9w0d    SAB         Birth Comments: blighted ovum found at 9 weeks by dates but GS only 6 weeks. had an Incomplete AB on own and after 2 weeks needed D&C for RPOC   1 Term 11 40w0d  3.629 kg (8 lb) M Vag-Spont EPI N LIVE BIRTH      Birth Comments: followed by Roseann, delivered by Nathan. PPH d/t atony. Bakri balloon placed      Name: Jamshid       Patient Active Problem List   Diagnosis     Gender dysphoria in adult     Past Surgical History:   Procedure Laterality Date     CHOLECYSTECTOMY  age 21     DILATION AND CURETTAGE SUCTION, TREAT INCOMPLETE    "10/16/2012    Procedure: DILATION AND CURETTAGE SUCTION, TREAT INCOMPLETE ;  suction dilation and curettage;  Surgeon: Wilda Whitfield MD;  Location:  GI      Social History     Tobacco Use     Smoking status: Never Smoker     Smokeless tobacco: Never Used   Substance Use Topics     Alcohol use: Yes     Comment: Rare       Problem (# of Occurrences) Relation (Name,Age of Onset)    Unknown/Adopted (2) Mother, Father            No current outpatient medications on file.     No current facility-administered medications for this visit.      Allergies   Allergen Reactions     Penicillins      Sulfa Drugs        Past medical, surgical, social and family histories were reviewed and updated in EPIC.    ROS:   12 point review of systems negative other than symptoms noted below or in the HPI.  No urinary frequency or dysuria, bladder or kidney problems, painful menses, heavy periods    EXAM:  /66   Pulse 78   Ht 1.676 m (5' 6\")   Wt 91.9 kg (202 lb 9.6 oz)   LMP 2019 (Exact Date)   Breastfeeding No   BMI 32.70 kg/m     BMI: Body mass index is 32.7 kg/m .    PHYSICAL EXAM:  Constitutional:   Appearance: Well nourished, well developed, alert, in no acute distress  Neck:  Lymph Nodes:  No lymphadenopathy present    Thyroid:  Gland size normal, nontender, no nodules or masses present  on palpation  Chest:  Respiratory Effort:  Breathing unlabored  Cardiovascular:    Heart: Auscultation:  Regular rate, normal rhythm, no murmurs present  Breasts: Palpation of Breasts and Axillae:  No masses present on palpation, no breast tenderness. and No nodularity, asymmetry or nipple discharge bilaterally.  Gastrointestinal:   Abdominal Examination:  Abdomen nontender to palpation, tone normal without rigidity or guarding, no masses present, umbilicus without lesions   Liver and Spleen:  No hepatomegaly present, liver nontender to palpation    Hernias:  No hernias present  Lymphatic: Lymph Nodes:  No other " lymphadenopathy present  Skin:  General Inspection:  No rashes present, no lesions present, no areas of  discoloration  Neurologic:    Mental Status:  Oriented X3.  Normal strength and tone, sensory exam                grossly normal, mentation intact and speech normal.    Psychiatric:   Mentation appears normal and affect normal/bright.         Pelvic Exam:  External Genitalia:     Normal appearance for age, no discharge present, no tenderness present, no inflammatory lesions present, color normal  Vagina:     Normal vaginal vault without central or paravaginal defects, no discharge present, no inflammatory lesions present, no masses present  Bladder:     Nontender to palpation  Urethra:   Urethral Body:  Urethra palpation normal, urethra structural support normal   Urethral Meatus:  No erythema or lesions present  Cervix:     Appearance healthy, no lesions present, nontender to palpation, no bleeding present  Uterus:     Uterus: firm, normal sized and nontender, anteverted in position.   Adnexa:     No adnexal tenderness present, no adnexal masses present  Perineum:     Perineum within normal limits, no evidence of trauma, no rashes or skin lesions present  Anus:     Anus within normal limits, no hemorrhoids present  Inguinal Lymph Nodes:     No lymphadenopathy present  Pubic Hair:     Normal pubic hair distribution for age  Genitalia and Groin:     No rashes present, no lesions present, no areas of discoloration, no masses present      COUNSELING:   Reviewed preventive health counseling, as reflected in patient instructions  Special attention given to:        menstrual suppression, future surgical options    BMI: Body mass index is 32.7 kg/m .  Weight management plan: Discussed healthy diet and exercise guidelines    ASSESSMENT:  39 year old adult with satisfactory annual exam.    ICD-10-CM    1. Encounter for gynecological examination without abnormal finding Z01.419 Pap imaged thin layer screen with HPV -  recommended age 30 - 65     HPV High Risk Types DNA Cervical   2. Gender dysphoria in adult F64.0 MA Screen Bilateral w/Luis Eduardo   3. Encounter for lipid screening for cardiovascular disease Z13.220 Lipid Profile    Z13.6    4. Screening for metabolic disorder Z13.228 Comprehensive metabolic panel   5. Screening for thyroid disorder Z13.29 TSH with free T4 reflex   6. Screening for disorder of blood and blood-forming organs Z13.0 CBC with platelets   7. Encounter for vitamin deficiency screening Z13.21 Vitamin D Deficiency   8. Screening for diabetes mellitus Z13.1 **A1C FUTURE anytime   9. Encounter for screening mammogram for malignant neoplasm of breast Z12.31 MA Screen Bilateral w/Luis Eduardo       PLAN:  Pap and HPV done today as hasn't had it in 6 yrs  Were able to get a mammo scheduled for tomorrow morning so will return for fasting labs at that same time since hasn't had any in years  Will need a formal preop within 30 days of planned top surgery which is being estimated for end of feb/beginning of march so will actually schedule that now  Discussed menses control vs hormonal/PMS control. Doing ocps gives estrogen so would help with PMS but not eliminate periods and continue to give estrogen. Could do mirena IUD. Highest chance of amenorrhea with time though irregular bleeding at first. Stops about 40-50% of ovulation so may decrease but not eliminate ovulation/PMS/etc  Discussed TLH with and without oopherectomy. Would obviously need to be addressed within the larger scope of her general health, need for hormones to avoid vasomotor sx, how testosterone can help with some of the things that estrogen does so if did hyst with BSO but not testosterone would have surgical menopause which is quite sx for most  Not quite ready to tackle all of that but very much would like to try a mirena IUD to at least minimize/eliminate the bleeding of the period and then go from there in a stepwise fashion in terms of further surgery and  hormones.  Will return the week of xmas for an IUD insert    Wilda Whitfield MD

## 2019-12-16 ENCOUNTER — OFFICE VISIT (OUTPATIENT)
Dept: OBGYN | Facility: CLINIC | Age: 39
End: 2019-12-16
Payer: COMMERCIAL

## 2019-12-16 VITALS
WEIGHT: 202.6 LBS | HEART RATE: 78 BPM | BODY MASS INDEX: 32.56 KG/M2 | SYSTOLIC BLOOD PRESSURE: 100 MMHG | DIASTOLIC BLOOD PRESSURE: 66 MMHG | HEIGHT: 66 IN

## 2019-12-16 DIAGNOSIS — Z13.1 SCREENING FOR DIABETES MELLITUS: ICD-10-CM

## 2019-12-16 DIAGNOSIS — Z13.220 ENCOUNTER FOR LIPID SCREENING FOR CARDIOVASCULAR DISEASE: ICD-10-CM

## 2019-12-16 DIAGNOSIS — Z13.228 SCREENING FOR METABOLIC DISORDER: ICD-10-CM

## 2019-12-16 DIAGNOSIS — Z01.419 ENCOUNTER FOR GYNECOLOGICAL EXAMINATION WITHOUT ABNORMAL FINDING: Primary | ICD-10-CM

## 2019-12-16 DIAGNOSIS — Z13.21 ENCOUNTER FOR VITAMIN DEFICIENCY SCREENING: ICD-10-CM

## 2019-12-16 DIAGNOSIS — Z12.31 ENCOUNTER FOR SCREENING MAMMOGRAM FOR MALIGNANT NEOPLASM OF BREAST: ICD-10-CM

## 2019-12-16 DIAGNOSIS — Z13.6 ENCOUNTER FOR LIPID SCREENING FOR CARDIOVASCULAR DISEASE: ICD-10-CM

## 2019-12-16 DIAGNOSIS — Z13.29 SCREENING FOR THYROID DISORDER: ICD-10-CM

## 2019-12-16 DIAGNOSIS — F64.0 GENDER DYSPHORIA IN ADULT: ICD-10-CM

## 2019-12-16 DIAGNOSIS — Z13.0 SCREENING FOR DISORDER OF BLOOD AND BLOOD-FORMING ORGANS: ICD-10-CM

## 2019-12-16 PROCEDURE — 87624 HPV HI-RISK TYP POOLED RSLT: CPT | Performed by: OBSTETRICS & GYNECOLOGY

## 2019-12-16 PROCEDURE — G0145 SCR C/V CYTO,THINLAYER,RESCR: HCPCS | Performed by: OBSTETRICS & GYNECOLOGY

## 2019-12-16 PROCEDURE — 99385 PREV VISIT NEW AGE 18-39: CPT | Performed by: OBSTETRICS & GYNECOLOGY

## 2019-12-16 ASSESSMENT — ANXIETY QUESTIONNAIRES
2. NOT BEING ABLE TO STOP OR CONTROL WORRYING: NOT AT ALL
GAD7 TOTAL SCORE: 0
1. FEELING NERVOUS, ANXIOUS, OR ON EDGE: NOT AT ALL
5. BEING SO RESTLESS THAT IT IS HARD TO SIT STILL: NOT AT ALL
3. WORRYING TOO MUCH ABOUT DIFFERENT THINGS: NOT AT ALL
IF YOU CHECKED OFF ANY PROBLEMS ON THIS QUESTIONNAIRE, HOW DIFFICULT HAVE THESE PROBLEMS MADE IT FOR YOU TO DO YOUR WORK, TAKE CARE OF THINGS AT HOME, OR GET ALONG WITH OTHER PEOPLE: NOT DIFFICULT AT ALL
6. BECOMING EASILY ANNOYED OR IRRITABLE: NOT AT ALL
7. FEELING AFRAID AS IF SOMETHING AWFUL MIGHT HAPPEN: NOT AT ALL

## 2019-12-16 ASSESSMENT — MIFFLIN-ST. JEOR: SCORE: 1620.74

## 2019-12-16 ASSESSMENT — PATIENT HEALTH QUESTIONNAIRE - PHQ9
5. POOR APPETITE OR OVEREATING: NOT AT ALL
SUM OF ALL RESPONSES TO PHQ QUESTIONS 1-9: 1

## 2019-12-17 ENCOUNTER — ANCILLARY PROCEDURE (OUTPATIENT)
Dept: MAMMOGRAPHY | Facility: CLINIC | Age: 39
End: 2019-12-17
Payer: COMMERCIAL

## 2019-12-17 DIAGNOSIS — Z13.220 ENCOUNTER FOR LIPID SCREENING FOR CARDIOVASCULAR DISEASE: ICD-10-CM

## 2019-12-17 DIAGNOSIS — Z13.21 ENCOUNTER FOR VITAMIN DEFICIENCY SCREENING: ICD-10-CM

## 2019-12-17 DIAGNOSIS — Z13.228 SCREENING FOR METABOLIC DISORDER: ICD-10-CM

## 2019-12-17 DIAGNOSIS — Z13.6 ENCOUNTER FOR LIPID SCREENING FOR CARDIOVASCULAR DISEASE: ICD-10-CM

## 2019-12-17 DIAGNOSIS — F64.0 GENDER DYSPHORIA IN ADULT: ICD-10-CM

## 2019-12-17 DIAGNOSIS — Z13.0 SCREENING FOR DISORDER OF BLOOD AND BLOOD-FORMING ORGANS: ICD-10-CM

## 2019-12-17 DIAGNOSIS — Z13.1 SCREENING FOR DIABETES MELLITUS: ICD-10-CM

## 2019-12-17 DIAGNOSIS — Z13.29 SCREENING FOR THYROID DISORDER: ICD-10-CM

## 2019-12-17 DIAGNOSIS — Z12.31 ENCOUNTER FOR SCREENING MAMMOGRAM FOR MALIGNANT NEOPLASM OF BREAST: ICD-10-CM

## 2019-12-17 LAB
ALBUMIN SERPL-MCNC: 3.8 G/DL (ref 3.4–5)
ALP SERPL-CCNC: 53 U/L (ref 40–150)
ALT SERPL W P-5'-P-CCNC: 30 U/L (ref 0–50)
ANION GAP SERPL CALCULATED.3IONS-SCNC: 4 MMOL/L (ref 3–14)
AST SERPL W P-5'-P-CCNC: 18 U/L (ref 0–45)
BILIRUB SERPL-MCNC: 0.6 MG/DL (ref 0.2–1.3)
BUN SERPL-MCNC: 15 MG/DL (ref 7–30)
CALCIUM SERPL-MCNC: 8.9 MG/DL (ref 8.5–10.1)
CHLORIDE SERPL-SCNC: 106 MMOL/L (ref 94–109)
CHOLEST SERPL-MCNC: 200 MG/DL
CO2 SERPL-SCNC: 28 MMOL/L (ref 20–32)
CREAT SERPL-MCNC: 0.77 MG/DL (ref 0.52–1.04)
DEPRECATED CALCIDIOL+CALCIFEROL SERPL-MC: 27 UG/L (ref 20–75)
ERYTHROCYTE [DISTWIDTH] IN BLOOD BY AUTOMATED COUNT: 12.8 % (ref 10–15)
GFR SERPL CREATININE-BSD FRML MDRD: >90 ML/MIN/{1.73_M2}
GLUCOSE SERPL-MCNC: 92 MG/DL (ref 70–99)
HBA1C MFR BLD: 5.3 % (ref 0–5.6)
HCT VFR BLD AUTO: 39 % (ref 35–47)
HDLC SERPL-MCNC: 42 MG/DL
HGB BLD-MCNC: 12.9 G/DL (ref 11.7–15.7)
LDLC SERPL CALC-MCNC: 127 MG/DL
MCH RBC QN AUTO: 28.9 PG (ref 26.5–33)
MCHC RBC AUTO-ENTMCNC: 33.1 G/DL (ref 31.5–36.5)
MCV RBC AUTO: 87 FL (ref 78–100)
NONHDLC SERPL-MCNC: 158 MG/DL
PLATELET # BLD AUTO: 257 10E9/L (ref 150–450)
POTASSIUM SERPL-SCNC: 4.1 MMOL/L (ref 3.4–5.3)
PROT SERPL-MCNC: 7.3 G/DL (ref 6.8–8.8)
RBC # BLD AUTO: 4.46 10E12/L (ref 3.8–5.2)
SODIUM SERPL-SCNC: 138 MMOL/L (ref 133–144)
TRIGL SERPL-MCNC: 153 MG/DL
TSH SERPL DL<=0.005 MIU/L-ACNC: 1.66 MU/L (ref 0.4–4)
WBC # BLD AUTO: 7.1 10E9/L (ref 4–11)

## 2019-12-17 PROCEDURE — 80061 LIPID PANEL: CPT | Performed by: OBSTETRICS & GYNECOLOGY

## 2019-12-17 PROCEDURE — 85027 COMPLETE CBC AUTOMATED: CPT | Performed by: OBSTETRICS & GYNECOLOGY

## 2019-12-17 PROCEDURE — 80053 COMPREHEN METABOLIC PANEL: CPT | Performed by: OBSTETRICS & GYNECOLOGY

## 2019-12-17 PROCEDURE — 83036 HEMOGLOBIN GLYCOSYLATED A1C: CPT | Performed by: OBSTETRICS & GYNECOLOGY

## 2019-12-17 PROCEDURE — 77067 SCR MAMMO BI INCL CAD: CPT | Mod: TC

## 2019-12-17 PROCEDURE — 77063 BREAST TOMOSYNTHESIS BI: CPT | Mod: TC

## 2019-12-17 PROCEDURE — 84443 ASSAY THYROID STIM HORMONE: CPT | Performed by: OBSTETRICS & GYNECOLOGY

## 2019-12-17 PROCEDURE — 82306 VITAMIN D 25 HYDROXY: CPT | Performed by: OBSTETRICS & GYNECOLOGY

## 2019-12-17 PROCEDURE — 36415 COLL VENOUS BLD VENIPUNCTURE: CPT | Performed by: OBSTETRICS & GYNECOLOGY

## 2019-12-17 ASSESSMENT — ANXIETY QUESTIONNAIRES: GAD7 TOTAL SCORE: 0

## 2019-12-17 NOTE — PROGRESS NOTES
I did not personally see the patient.  I reviewed and agree with the assessment and plan as documented in this note.     Nova Ruiz PsyD, LP

## 2019-12-19 LAB
COPATH REPORT: NORMAL
PAP: NORMAL

## 2019-12-23 ENCOUNTER — PATIENT OUTREACH (OUTPATIENT)
Dept: PLASTIC SURGERY | Facility: CLINIC | Age: 39
End: 2019-12-23

## 2019-12-23 LAB
FINAL DIAGNOSIS: NORMAL
HPV HR 12 DNA CVX QL NAA+PROBE: NEGATIVE
HPV16 DNA SPEC QL NAA+PROBE: NEGATIVE
HPV18 DNA SPEC QL NAA+PROBE: NEGATIVE
SPECIMEN DESCRIPTION: NORMAL
SPECIMEN SOURCE CVX/VAG CYTO: NORMAL

## 2019-12-23 NOTE — PROGRESS NOTES
[facility]:  Care Coordination Note     SITUATION   Patient (Shakir, no pronouns preferred) is a 39 year old who is receiving support for:  Clinic Care Coordination - Follow-up (received letter of support & mammogram)  .    BACKGROUND     Pt had top consult with Noam on 12/10/2019.  Pt has adequate letter of support.   Pt completed mammogram.     Ready to PA.     ASSESSMENT     Surgery              CGC Assessment  [facility] ([facility] [place] Enrollment: Enrolled(Not on hormones)  Patient has a therapist: Yes  Name of therapist: Darline Bailon, [place] [facility]  Letter of support #1: Received  Letter #1 Date: 12/19/19  Surgery being considered: Yes  Mastectomy: Yes(12/17/19, negative)  Mammogram completed: Yes    SCREENING MAMMOGRAM, BILATERAL, DIGITAL w/CAD AND TOMOSYNTHESIS -  12/17/2019 8:55 AM     BREAST SYMPTOMS: No current breast complaints.      COMPARISON:  Baseline.     BREAST DENSITY: Scattered fibroglandular densities.     COMMENTS: No findings of suspicion for malignancy.                                                                       IMPRESSION: BI-RADS CATEGORY: 1 -  Negative     RECOMMENDED FOLLOW-UP: Annual Mammography.     Exam results letter mailed to patient.        PATRICIA CLARKE MD      PLAN          Nursing Interventions:   Reviewed letter of support for WPATH standards of care which is adequate.     Follow-up plan:  Ready to PA. [facility] to review mammogram.        Lucas Tobar

## 2019-12-26 NOTE — PROGRESS NOTES
IUD Insertion:  CONSULT:    Is a pregnancy test required: Yes.  Was it positive or negative?  Negative  Was a consent obtained?  Yes    Subjective: Roseanna Dorsey is a 39 year old  presents for IUD and desires Mirena type IUD.    Patient has been given the opportunity to ask questions about all forms of birth control, including all options appropriate for Roseanna Dorsey. Discussed that no method of birth control, except abstinence is 100% effective against pregnancy or sexually transmitted infection.     Roseanna Dorsey understands she may have the IUD removed at any time. IUD should be removed by a health care provider.    The entire insertion procedure was reviewed with the patient, including care after placement.    Patient's last menstrual period was 2019 (exact date). Last sexual activity: is with female partner. No allergy to betadine or shellfish. Patient declines STD screening  HCG Qual Urine   Date Value Ref Range Status   2019 Negative NEG^Negative Final     Comment:     This test is for screening purposes.  Results should be interpreted along with   the clinical picture.  Confirmation testing is available if warranted by   ordering CSM677, HCG Quantitative Pregnancy.           /70   Pulse 72   Wt 92.3 kg (203 lb 6.4 oz)   LMP 2019 (Exact Date)   BMI 32.83 kg/m      Pelvic Exam:   EG/BUS: normal genital architecture without lesions, erythema or abnormal secretions.   Vagina: moist, pink, rugae with physiologic discharge and secretions  Cervix: multiparous no lesions and pink, moist, closed, without lesion or CMT  Uterus: anteverted position, mobile, no pain  Adnexa: within normal limits and no masses, nodularity, tenderness    PROCEDURE NOTE: -- IUD Insertion    Reason for Insertion: contraception and for attempt for amenorrhea until hysterectomy is considered/done for gender dysphoria and transitioning    Premedicated with  ibuprofen.  Under sterile technique, cervix was visualized with speculum and prepped with Betadine solution swab x 3. Tenaculum was placed for stability. The uterus was gently straightened and sounded to 7.5 cm. IUD prepared for placement, and IUD inserted according to 's instructions without difficulty or significant resitance, and deployed at the fundus. The strings were visualized and trimmed to 3.0 cm from the external os. Tenaculum was removed and hemostasis noted. Speculum removed.  Patient tolerated procedure well.    Lot # [id number]  Exp: 3/2022    NDC 02143-051-60  Mirena    EBL: minimal    Complications: none    ASSESSMENT:     ICD-10-CM    1. Encounter for insertion of intrauterine contraceptive device Z30.430    2. Pre-procedure lab exam Z01.812 HCG Qual, Urine ([id number])        PLAN:    Given 's handouts, including when to have IUD removed, list of danger s/sx, side effects and follow up recommended. Encouraged condom use for prevention of STD. Back up contraception advised for 7 days if progestin method. Advised to call for any fever, for prolonged or severe pain or bleeding, abnormal vaginal discharge, or unable to palpate strings. She was advised to use pain medications (ibuprofen) as needed for mild to moderate pain. Advised to follow-up in clinic in 4-6 weeks for IUD string check if unable to find strings or as directed by provider.     Discussed normal DUB pattern the next few months as normal and what is not normal and when to call  Patient is hoping that since deductible will be met with top surgery next year that may consider hyst/BSO next year as well. Hasn't really thought too much about it yet but know that will want it done eventually  Isn't sure what his plan are for hormone therapy. Wondering about insurance approval/coverage for a hyst, etc  Discussed TLH/BSO and the recovery, restrictions, downtime.  Discussed that would need to have some guidance from the  [facility] gender clinic in terms of hormones and the loss of estrogen leading to vasomotor sx, etc but that the hyst/bso could certainly be done    Wilda Whitfield MD

## 2019-12-27 ENCOUNTER — OFFICE VISIT (OUTPATIENT)
Dept: OBGYN | Facility: CLINIC | Age: 39
End: 2019-12-27
Payer: COMMERCIAL

## 2019-12-27 ENCOUNTER — TELEPHONE (OUTPATIENT)
Dept: SURGERY | Facility: CLINIC | Age: 39
End: 2019-12-27

## 2019-12-27 VITALS
HEART RATE: 72 BPM | BODY MASS INDEX: 32.83 KG/M2 | DIASTOLIC BLOOD PRESSURE: 70 MMHG | SYSTOLIC BLOOD PRESSURE: 120 MMHG | WEIGHT: 203.4 LBS

## 2019-12-27 DIAGNOSIS — Z30.430 ENCOUNTER FOR INSERTION OF INTRAUTERINE CONTRACEPTIVE DEVICE: Primary | ICD-10-CM

## 2019-12-27 DIAGNOSIS — Z01.812 PRE-PROCEDURE LAB EXAM: ICD-10-CM

## 2019-12-27 LAB — HCG UR QL: NEGATIVE

## 2019-12-27 PROCEDURE — 58300 INSERT INTRAUTERINE DEVICE: CPT | Performed by: OBSTETRICS & GYNECOLOGY

## 2019-12-27 PROCEDURE — 81025 URINE PREGNANCY TEST: CPT | Performed by: OBSTETRICS & GYNECOLOGY

## 2020-01-03 ENCOUNTER — TELEPHONE (OUTPATIENT)
Dept: SURGERY | Facility: CLINIC | Age: 40
End: 2020-01-03

## 2020-01-07 DIAGNOSIS — F64.0 GENDER DYSPHORIA IN ADOLESCENT AND ADULT: Primary | ICD-10-CM

## 2020-01-07 RX ORDER — CLINDAMYCIN PHOSPHATE 900 MG/50ML
900 INJECTION, SOLUTION INTRAVENOUS
Status: CANCELLED | OUTPATIENT
Start: 2020-01-07

## 2020-01-07 RX ORDER — CLINDAMYCIN PHOSPHATE 900 MG/50ML
900 INJECTION, SOLUTION INTRAVENOUS SEE ADMIN INSTRUCTIONS
Status: CANCELLED | OUTPATIENT
Start: 2020-01-07

## 2020-01-08 ENCOUNTER — HOSPITAL ENCOUNTER (OUTPATIENT)
Facility: CLINIC | Age: 40
End: 2020-01-08
Attending: SURGERY | Admitting: SURGERY
Payer: COMMERCIAL

## 2020-01-08 ENCOUNTER — TELEPHONE (OUTPATIENT)
Dept: SURGERY | Facility: CLINIC | Age: 40
End: 2020-01-08

## 2020-01-08 DIAGNOSIS — F64.0 GENDER DYSPHORIA IN ADOLESCENT AND ADULT: ICD-10-CM

## 2020-01-08 NOTE — TELEPHONE ENCOUNTER
ORDER RECEIVED VIA: CASE MESSAGE    Spoke with patient to schedule surgery with Dr Lizette Santacruz     Surgery was scheduled on 2/26 at [facility]    Patient will have pre-surgery visit with PCP      -Patient advised H&P is needed or surgery cancellation may occur    Post-Op care appointment scheduled?  YES on 3/4    Patient is aware a / is needed day of surgery.     Surgery packet was sent via [employer], patient has my direct contact information for any further questions.     Naz Zaragoza  Surgical Alejandra-Op Coordinator  [phone]

## 2020-01-09 ENCOUNTER — OFFICE VISIT (OUTPATIENT)
Dept: OTHER | Facility: OUTPATIENT CENTER | Age: 40
End: 2020-01-09
Payer: COMMERCIAL

## 2020-01-09 ENCOUNTER — TELEPHONE (OUTPATIENT)
Dept: SURGERY | Facility: CLINIC | Age: 40
End: 2020-01-09

## 2020-01-09 DIAGNOSIS — F64.0 GENDER DYSPHORIA IN ADULT: Primary | ICD-10-CM

## 2020-01-09 NOTE — TELEPHONE ENCOUNTER
Rescheduled surgery with Dr. Santacruz to 3/30 per pt request. Also postponed post-op appointment to 4/7.

## 2020-01-10 NOTE — PROGRESS NOTES
"[facility] -  Case Progress Note    Date of Service: 20   Name: Roseanna Dorsey - \"Bec\" (they/them)  : 1980  Medical Record Number: [id number]  Treating Provider: Darline Bailon, PhD - postdoctoral fellow  Type of Session: Individual  Present in Session: Client  Number of Minutes:  55    Current Symptoms/Status:  Dysphoria: Discomfort with birth-assigned sex and desire to live and be perceived as gender other than sex assigned at birth.   Chest dysphoria is primary and client is pursuing top surgery.     Progress Toward Treatment Goals:   Top surgery consult completed, prior authorization approved, in process of scheduling for 2020  Client came out via individual conversation to all friends, family, and children     Intervention: Modality and Description:  Primary intervention was supportive and strengths-based psychotherapy towards client's gender goals. Client confirmed that letter of support was processed and surgery is in process of scheduling. Client shared a positive holiday break at home, during which they came out to family. Client shared their relief at support they received from family. Client shared positive conversation with children about their gender identity and transition. Clinician shared resources for ongoing conversation with children about gender, including \"When Mitchell Lost His Spots,\" \"Who Are You: The Kid's Guide to Gender Identity,\" and \"Sex is a Funny Word.\" Client reflected on the importance of self care as they come out of an emotionally intense time, and their continued efforts towards attending to self care. Client reflected on the value of their social support network in their transition and aftercare. Clinician utilized attunement, active and reflective listening, and validation of experience throughout. Client presented as open, engaged, and responsive to intervention.    Assignment:  None    DSM-5 Diagnoses:    302.85 Gender Dysphoria " in Adolescents and Adults     Plan/Need for Future Services:  At this time, client continues regular care with primary mental health provider, and feels well supported towards their gender goals by their therapist, spouse/family, and friends. Client intends to follow up here post-surgery, and will schedule appointments through winter/spring if needs arise.       Darline Bailon, PhD - Postdoctoral Fellow

## 2020-02-25 ENCOUNTER — OFFICE VISIT (OUTPATIENT)
Dept: PLASTIC SURGERY | Facility: CLINIC | Age: 40
End: 2020-02-25
Payer: COMMERCIAL

## 2020-02-25 VITALS — HEIGHT: 66 IN | WEIGHT: 203 LBS | BODY MASS INDEX: 32.62 KG/M2

## 2020-02-25 DIAGNOSIS — F64.0 GENDER DYSPHORIA IN ADOLESCENT AND ADULT: Primary | ICD-10-CM

## 2020-02-25 ASSESSMENT — MIFFLIN-ST. JEOR: SCORE: 1622.55

## 2020-02-25 ASSESSMENT — PAIN SCALES - GENERAL: PAINLEVEL: NO PAIN (0)

## 2020-02-25 NOTE — PROGRESS NOTES
PLASTICS PRE-OP  Beck is a 39 year old year old biological female who identifies as trans masculine who presents for their pre-op visit prior to bilateral simple mastectomy with nipple graft reconstruction scheduled for 3/30/2020. They are here today with their wife, Juana. The patient had a negative mammogram on 12/17/19, and their letter of support has been received. History and physical is scheduled for 3/6.    My LPN, Jacqueline, discussed periop instructions with the patient including: not eating anything 8 hours prior to surgery, drinking clear liquids up to 2 hours before surgery except for morning medications with a sip of water, the preop shower with surgical soap, and wearing a button- or zip-up shirt on the day of surgery. She also instructed the patient to not take any NSAIDs the week prior to surgery, but they may take Tylenol post-op for pain as needed. She also gave the patient a folder with information on bailey-op topics, including where the surgery will be.     I discussed the following with the patient; preop, intraop and postop phases of care on the day of surgery, the placement of a bladder catheter during surgery that will likely be removed in recovery, postop cares and limitations with relation to home and work settings, 5-lb weight restriction for the first 3 weeks postop, and how long to maintain limited activities. We also discussed Zofran, oxycodone, Z-vidhya, and antipruritics which will be prescribed. We discussed that preventing constipation will be their responsibility, and we discussed methods such as aloe, prune juice or Miralax.     In addition, we went over the possible risks and complications involved with this elective procedure. These include but are not limited to: infection, bleeding, hematoma/seroma formation, poor healing - including dehiscence, nipple graft loss, hypertrophic scarring. Altered chest sensation - either hypo or hypersensitive, residual deformities and asymmetries,  possible further surgical revisions, possible injury to surrounding neurovascular and musculoskeletal structures, including intra-axillary or intra-thoracic, anesthetic risks such as DVT/PE or cardiopulmonary events.      Discussed that we will further discuss RTW +/- restrictions details at the 1 week post-op visit. All other questions were answered. Schilling will bring any other questions that arise to the day of surgery.    Total time = 15 minutes, all spent educating about upcoming procedure.    This note was prepared on behalf of Holli Santacruz MD, by Lizette De La Cruz, a trained medical scribe, based on my observations and the provider's statements to me.

## 2020-02-25 NOTE — NURSING NOTE
"Chief Complaint   Patient presents with     Pre-Op Exam     Pt here for pre op for top surgery       Vitals:    02/25/20 1403   Weight: 92.1 kg (203 lb)   Height: 1.676 m (5' 6\")       Body mass index is 32.77 kg/m .      ROSE BreenT                    No vitals taken per provider  "

## 2020-02-25 NOTE — PATIENT INSTRUCTIONS
Bilateral Mastectomy   Pre and Post op Surgery Instructions    You are scheduled for Bilateral Simple Mastectomy with nipple grafts on 3/30/2020 at 7:30am    You will need to arrive at 5:30am [place] [address]. You can park in the Green [address] and check in on 3rd floor. (See attached map).     - Please make sure you have someone to drive you home after your surgery and you will need someone to stay with you at home 24 hours after your surgery.    The surgery will be about 3-4 hours long. You will be in recovery afterwards for about 1-2 hours.     Before Surgery:  - 8 hours prior to surgery stop eating solid food. You can continue to drink clear liquids (water, apple juice, Gatorade) up to 2 hours before surgery. If you have any medications that need to be taken in this timeframe, you can take them with a small sip of water    - No Ibuprofen, Advil, Aleve, Naproxen, Motrin, Aspirin for 7 days prior to surgery. If you are having pain in the week before surgery Tylenol is okay to take.    - Wear or bring a button up or zip up shirt with you to the hospital.    - Wash with surgical soap:      Showering with an antiseptic soap prior to an invasive procedure will decrease the  bacteria that is normally found on the skin.     Using 1/2 of the bottle for each application.  Be sure to use the whole bottle before coming to surgery.    The evening before surgery, shower or bathe as you normally would, using your preferred soap.  Give special attention to areas where the incisions will be made. Rinse thoroughly.  You may wash your hair with your regular shampoo.     Next wash your entire body, from the chin down, with the special antiseptic soap (full strength) using a freshly laundered clean washcloth, again giving special attention to areas where incisions will be made.    Rinse thoroughly and dry off using a freshly laundered clean towel.     Wear clean  clothes/pajamas and sleep on clean sheets    Repeat steps 2 and 3 in the morning before coming to surgery (using the rest of the bottle of soap).     **If you have a reaction to the surgical soap, let the nurse know.     Events of day:     -Check in on the 3rd floor of the hospital for your surgery.    Pre-op     - After you check in, you will meet with a pre-op nurse. They will go over your medications, review your H&P (pre-op physical), have you change into a paper gown, and your chest will be wiped again with soap.    - They will place compression boots on both legs to help decrease risk of blood clots.     - You may be asked to complete a pregnancy test. If you have had no exposure to sperm, you can decline.    - You will meet with the anesthesiologists and nurse anesthetist who will be keeping you asleep during surgery, they will be placing an IV, and will be monitoring you throughout the surgery.    - You will meet with the RN as you are being moved into operating room, they will be helping to position you and keep you comfortable during the surgery.     - Dr. Santacruz will meet you in the pre-op area to discuss any questions about surgery, make markings on your chest for planning, and to sign your consent.     Intra-op (OR)    - A catheter will be placed in your bladder after you are sleeping and is typically removed before you wake up. The longest this would typically be in is in the post-op recovery room if needed.     - There will be straps and pillows to help position you and keep you in place during the surgery.    - The tissue that is removed will be sent to pathology to be analyzed and then discarded.     - RIOS drains will be placed (one in each armpit) and a pain catheter will be placed in the center of your chest.     - Closure is done with dissolvable sutures under the skin and is then sealed with glue, and tape.    Post-op/Recovery    - You can usually go home the same day so long as you are eating,  "drinking, voiding, and pain is well controlled.  You will be sent home with all needed supplies.     - Post-Op medications you will be given in addition to the anesthesia include: Zofran (nausea), Oxycodone (pain), Z-vidhya (infection), and antipruritic (itching).     - You will leave the hospitals with an ace bandage wrapped around your chest for compression. You may keep the ace bandage on until you come back for your one week post op visit or you may adjust the wrap as needed if it is either too tight or too loose.     Post-Op Cares:     - No lifting over 5 lbs for 3 weeks after surgery    - No stretching arms out or above the head (\"modified T-jaki\")    - Walk around frequently to help prevent blood clots    - Do deep breathing exercises to prevent pneumonia    - No sleeping on stomach x 3 weeks after surgery, if it is difficult not to roll over onto your stomach you can sleep in a recliner or use additional pillows    - Do not use any ice packs or heat packs    - Preventing constipation will be your responsibility. You can use whatever method works best for you such as; aloe, prune juice, Sennokot or Miralax.    No showering in the first week after your surgery.     What to expect at your 1st post op visit:    When you come in for your 1st post op visit, we will assess the output of your drains and remove the pain catheter (On-Q) that was placed on your chest.     -Please remember to bring the documentations of your output with you to your appointment so we can review how much your drains have been putting out since your surgery.     -We will remove the bolsters that was placed on your nipples and teach you how to perform nipple graft dressings.     -You will be given supplies to take home and will need to continue wearing the ace wrap compression for another week after the drains are removed.       Nipple Care:   Change nipple dressings daily.  Take dressings off prior to showering and reapply after showering.  No " direct shower spray on nipple grafts for 4 weeks.     Cut vaseline gauze to nipple size and place over nipple.  Place folded white gauze over vaseline gauze and cover with plastic dressing.  Do dressing changes for 1 more week.  If you continue to have drainage, continue the dressings until drainage stops.       Drain Care:  You will be discharged with [place] (RIOS) drainage tubes.  These tubes drain fluid from your incision, helping to prevent swelling and reducing the risk for infection.  The tube is held in place by a few stitches. Pin your RIOS drain to your clothing by using a safety pin through the plastic loop on the top of the bulb. If the drain is not attached to your clothing, it may pull out from under your skin. Drains are uncomfortable!    Emptying the drain bulb: The measuring cup provided by the hospital or a measuring cup that is used only for the RIOS drain.  1. Wash your hands with soap and water.  2. Hold the bulb securely.  3. Remove the drainage plug from the emptying port.  4. Carefully turn the bulb upside down over the measuring cup, and gently squeeze all of the drainage into the measuring cup.  5. Squeeze the middle of the bulb firmly so that the bulb is as flat as possible.                                                                                                                                                    6. While still squeezing the bulb, replace the drainage plug. This step is important to keep the drain suction  7. Measure how much fluid you removed from the bulb.  8. Write down the amount and color of the fluid you removed from the bulb. If  you have more than one drain, keep a separate record for each one.  9. Empty the fluid into the toilet and flush.  10. Rinse the measuring cup, and wash your hands with soap and water.  11. You should empty the drain at least two times each day, in the morning and at bedtime.  12. Drainage tubes are removed when the output is less  than 20ml in a 24 hour period for 2 consecutive days.  13. Output will be somewhere between 30 to 50 mLs per day, but don't be alarmed if it is more or less. Output may not be equal between sides. Amounts will probably decrease over time.  14. The color coming from the drains may vary from deep red, light pink, or clear yellow.    Stripping the tube:  To prevent clots from blocking the drain, you will need to  strip  it. Stripping means that you use your fingers to squeeze along the length of the drain to help maintain the flow of drainage.    Wash your hands.    Using one hand, firmly hold the tubing near the insertion site (as close to your skin as the ace wrap and gauze dressing will allow). This will prevent the drain from being pulled out while you are stripping it and from pulling on skin and sutures.    [place] upper/top fingers and milk with the bottom or lower fingers.    Using your index finger and thumb of the other hand, squeeze the tubing below the  first hand. You should squeeze it firmly enough so the tubing becomes flat.     Maintain pressure on the tube, as you are squeezing, slide your index finger and thumb down the tube about 4-6 inches toward the bulb. (use alcohol wipes or lotion to help).    Then, release the hand closest to where the tube is attached to the body (making sure to keep pressure with the other hand), and move upper/anchor hand down. Squeeze, Repeat in segments.    Do not release the pressure you are creating in the tubing until you reach the bulb.  The whole tube will be flat.     If at any time it feels like the tube is pulling away from the skin or starts to hurt STOP! Then start over again more gently.     Strip the drain each time you empty it.

## 2020-02-25 NOTE — LETTER
2/25/2020       RE: Roseanna Dorsey  [address] [address] [address]     Dear Colleague,    Thank you for referring your patient, Roseanna Dorsey, to the [facility] PLASTIC AND RECONSTRUCTIVE SURGERY at [facility]. Please see a copy of my visit note below.    PLASTICS PRE-OP  Beck is a 39 year old year old biological female who identifies as trans masculine who presents for their pre-op visit prior to bilateral simple mastectomy with nipple graft reconstruction scheduled for 3/30/2020. They are here today with their wife, Juana. The patient had a negative mammogram on 12/17/19, and their letter of support has been received. History and physical is scheduled for 3/6.    My LPN, Jacqueline, discussed periop instructions with the patient including: not eating anything 8 hours prior to surgery, drinking clear liquids up to 2 hours before surgery except for morning medications with a sip of water, the preop shower with surgical soap, and wearing a button- or zip-up shirt on the day of surgery. She also instructed the patient to not take any NSAIDs the week prior to surgery, but they may take Tylenol post-op for pain as needed. She also gave the patient a folder with information on bailey-op topics, including where the surgery will be.     I discussed the following with the patient; preop, intraop and postop phases of care on the day of surgery, the placement of a bladder catheter during surgery that will likely be removed in recovery, postop cares and limitations with relation to home and work settings, 5-lb weight restriction for the first 3 weeks postop, and how long to maintain limited activities. We also discussed Zofran, oxycodone, Z-vidhya, and antipruritics which will be prescribed. We discussed that preventing constipation will be their responsibility, and we discussed methods such as aloe, prune juice or Miralax.     In addition, we went over the possible  risks and complications involved with this elective procedure. These include but are not limited to: infection, bleeding, hematoma/seroma formation, poor healing - including dehiscence, nipple graft loss, hypertrophic scarring. Altered chest sensation - either hypo or hypersensitive, residual deformities and asymmetries, possible further surgical revisions, possible injury to surrounding neurovascular and musculoskeletal structures, including intra-axillary or intra-thoracic, anesthetic risks such as DVT/PE or cardiopulmonary events.      Discussed that we will further discuss RTW +/- restrictions details at the 1 week post-op visit. All other questions were answered. Schilling will bring any other questions that arise to the day of surgery.    Total time = 15 minutes, all spent educating about upcoming procedure.    This note was prepared on behalf of Holli Santacruz MD, by Lizette De La Cruz, a trained medical scribe, based on my observations and the provider's statements to me.     Again, thank you for allowing me to participate in the care of your patient.      Sincerely,    Holli Santacruz MD

## 2020-03-06 ENCOUNTER — OFFICE VISIT (OUTPATIENT)
Dept: OBGYN | Facility: CLINIC | Age: 40
End: 2020-03-06
Payer: COMMERCIAL

## 2020-03-06 VITALS
DIASTOLIC BLOOD PRESSURE: 72 MMHG | WEIGHT: 208 LBS | SYSTOLIC BLOOD PRESSURE: 112 MMHG | HEART RATE: 72 BPM | BODY MASS INDEX: 33.43 KG/M2 | HEIGHT: 66 IN

## 2020-03-06 DIAGNOSIS — Z01.818 PRE-OP TESTING: ICD-10-CM

## 2020-03-06 DIAGNOSIS — F64.0 GENDER DYSPHORIA IN ADULT: Primary | ICD-10-CM

## 2020-03-06 DIAGNOSIS — Z01.818 PREOP GENERAL PHYSICAL EXAM: ICD-10-CM

## 2020-03-06 LAB
ERYTHROCYTE [DISTWIDTH] IN BLOOD BY AUTOMATED COUNT: 12.8 % (ref 10–15)
HCT VFR BLD AUTO: 40.5 % (ref 35–47)
HGB BLD-MCNC: 13.4 G/DL (ref 11.7–15.7)
MCH RBC QN AUTO: 29.4 PG (ref 26.5–33)
MCHC RBC AUTO-ENTMCNC: 33.1 G/DL (ref 31.5–36.5)
MCV RBC AUTO: 89 FL (ref 78–100)
PLATELET # BLD AUTO: 253 10E9/L (ref 150–450)
RBC # BLD AUTO: 4.56 10E12/L (ref 3.8–5.2)
WBC # BLD AUTO: 9.6 10E9/L (ref 4–11)

## 2020-03-06 PROCEDURE — 36415 COLL VENOUS BLD VENIPUNCTURE: CPT | Performed by: OBSTETRICS & GYNECOLOGY

## 2020-03-06 PROCEDURE — 85027 COMPLETE CBC AUTOMATED: CPT | Performed by: OBSTETRICS & GYNECOLOGY

## 2020-03-06 PROCEDURE — 99213 OFFICE O/P EST LOW 20 MIN: CPT | Performed by: OBSTETRICS & GYNECOLOGY

## 2020-03-06 RX ORDER — CHOLECALCIFEROL (VITAMIN D3) 50 MCG
TABLET ORAL
COMMUNITY
End: 2022-06-15

## 2020-03-06 ASSESSMENT — MIFFLIN-ST. JEOR: SCORE: 1645.23

## 2020-03-06 NOTE — PROGRESS NOTES
[facility]  [address]  [place] [address]  [phone]  Dept: [phone]    PRE-OP EVALUATION:  Today's date: 3/6/2020    Roseanna Dorsey (: 1980) presents for pre-operative evaluation assessment as requested by Dr. Holli Santacruz, Plastic Surgery.  Shakir Dorsey requires evaluation and anesthesia risk assessment prior to undergoing surgery/procedure for gender dysphoria.    Proposed Surgery/ Procedure: Bilateral Simple Mastectomy  Date of Surgery/ Procedure: 3/30/20  Time of Surgery/ Procedure: 7:30am  Hospital/Surgical Facility: [facility]  Primary Physician: Wilda Whitfield  Type of Anesthesia Anticipated: General    Patient has a Health Care Directive or Living Will:  YES     1. NO - Do you have a history of heart attack, stroke, stent, bypass or surgery on an artery in the head, neck, heart or legs?  2. NO - Do you ever have any pain or discomfort in your chest?  3. NO - Do you have a history of  Heart Failure?  4. NO - Are you troubled by shortness of breath when: walking on the level, up a slight hill or at night?  5. NO - Do you currently have a cold, bronchitis or other respiratory infection?  6. NO - Do you have a cough, shortness of breath or wheezing?  7. NO - Do you sometimes get pains in the calves of your legs when you walk?  8. NO - Do you or anyone in your family have previous history of blood clots?  9. NO - Do you or does anyone in your family have a serious bleeding problem such as prolonged bleeding following surgeries or cuts?  10. NO - Have you ever had problems with anemia or been told to take iron pills?  11. NO - Have you had any abnormal blood loss such as black, tarry or bloody stools, or abnormal vaginal bleeding?  12. NO - Have you ever had a blood transfusion?  13. NO - Have you or any of your relatives ever had problems with anesthesia?  14. NO - Do you have sleep apnea, excessive snoring or daytime  drowsiness?  15. NO - Do you have any prosthetic heart valves?  16. NO - Do you have prosthetic joints?  17. NO - Is there any chance that you may be pregnant?      HPI:     HPI related to upcoming procedure: bilateral simple mastectomy      See problem list for active medical problems.  Problems all longstanding and stable, except as noted/documented.  See ROS for pertinent symptoms related to these conditions.      MEDICAL HISTORY:     Patient Active Problem List    Diagnosis Date Noted     Gender dysphoria in adolescent and adult 2020     Priority: Medium     Added automatically from request for surgery [id number]       Gender dysphoria in adult 2019     Priority: Medium      Past Medical History:   Diagnosis Date     Anemia     PPH with last pregnancy     Gender dysphoria in adult 2019     Presence of of 52 mg levonorgestrel-releasing intrauterine device (IUD) 2019    placed by Roseann to attempt amenorrhea until hyst can be done     Past Surgical History:   Procedure Laterality Date     CHOLECYSTECTOMY  age 21     DILATION AND CURETTAGE SUCTION, TREAT INCOMPLETE   10/16/2012    Procedure: DILATION AND CURETTAGE SUCTION, TREAT INCOMPLETE ;  suction dilation and curettage;  Surgeon: Wilda Whitfield MD;  Location: [facility]     Current Outpatient Medications   Medication Sig Dispense Refill     Cholecalciferol (VITAMIN D3) 50 MCG (2000 UT) TABS        levonorgestrel (MIRENA) 20 MCG/24HR IUD 1 each (20 mcg) by Intrauterine route once       OTC products: None, except as noted above    Allergies   Allergen Reactions     Penicillins      Sulfa Drugs       Latex Allergy: NO    Social History     Tobacco Use     Smoking status: Never Smoker     Smokeless tobacco: Never Used   Substance Use Topics     Alcohol use: Yes     Comment: Rare      History   Drug Use No       REVIEW OF SYSTEMS:   CONSTITUTIONAL: NEGATIVE for fever, chills, change in weight  INTEGUMENTARY/SKIN: NEGATIVE for  "worrisome rashes, moles or lesions  EYES: NEGATIVE for vision changes or irritation  ENT/MOUTH: NEGATIVE for ear, mouth and throat problems  RESP: NEGATIVE for significant cough or SOB  BREAST: NEGATIVE for masses, tenderness or discharge  CV: NEGATIVE for chest pain, palpitations or peripheral edema  GI: NEGATIVE for nausea, abdominal pain, heartburn, or change in bowel habits  : NEGATIVE for frequency, dysuria, or hematuria   female: menses: irregular since mirena IUD. Daily bleeding in January but then just light menses this last month but dysmenorrhea this last month that is worse than baseline cramps with menses. Bleeding has improved though  MUSCULOSKELETAL: NEGATIVE for significant arthralgias or myalgia  NEURO: NEGATIVE for weakness, dizziness or paresthesias  ENDOCRINE: NEGATIVE for temperature intolerance, skin/hair changes  HEME: NEGATIVE for bleeding problems  PSYCHIATRIC: NEGATIVE for changes in mood or affect    EXAM:   /72   Pulse 72   Ht 1.676 m (5' 6\")   Wt 94.3 kg (208 lb)   BMI 33.57 kg/m      GENERAL APPEARANCE: healthy, alert and no distress     EYES: EOMI, PERRL     NECK: no adenopathy, no asymmetry, masses, or scars and thyroid normal to palpation     RESP: lungs clear to auscultation - no rales, rhonchi or wheezes     CV: regular rates and rhythm, normal S1 S2, no S3 or S4 and no murmur, click or rub     ABDOMEN:  soft, nontender, no HSM or masses and bowel sounds normal     GU_female: normal cervix with IUD, adnexae, and uterus without masses or discharge. Skin tags of hemorrhoid x2 at 12 and 6 oclock but no active hemorrhoid noted     MS: extremities normal- no gross deformities noted, no evidence of inflammation in joints, FROM in all extremities.     SKIN: no suspicious lesions or rashes     NEURO: Normal strength and tone, sensory exam grossly normal, mentation intact and speech normal     PSYCH: mentation appears normal. and affect normal/bright     LYMPHATICS: No cervical " adenopathy    DIAGNOSTICS:   Hemoglobin (indicated for history of anemia or procedure with significant blood loss such as tonsillectomy, major intraperitoneal surgery, vascular surgery, major spine surgery, total joint replacement)    Recent Labs   Lab Test 12/17/19  0907 01/29/19  1159   HGB 12.9 15.9*    327    136   POTASSIUM 4.1 4.7   CR 0.77 0.92   A1C 5.3  --         IMPRESSION:   Reason for surgery/procedure: gender dysphoria  Diagnosis/reason for consult: preop clearance for simple bilateral mastectomy    The proposed surgical procedure is considered LOW risk.    REVISED CARDIAC RISK INDEX  The patient has the following serious cardiovascular risks for perioperative complications such as (MI, PE, VFib and 3  AV Block):  No serious cardiac risks  INTERPRETATION: 1 risks: Class II (low risk - 0.9% complication rate)    The patient has the following additional risks for perioperative complications:  No identified additional risks      ICD-10-CM    1. Preop general physical exam Z01.818        RECOMMENDATIONS:       --Patient is on no chronic medications    APPROVAL GIVEN to proceed with proposed procedure, without further diagnostic evaluation       Signed Electronically by: Wilda Whitfield MD    Copy of this evaluation report is provided to requesting physician.    [place] Preop Guidelines    Revised Cardiac Risk Index

## 2020-03-16 ENCOUNTER — TELEPHONE (OUTPATIENT)
Dept: SURGERY | Facility: CLINIC | Age: 40
End: 2020-03-16

## 2020-03-16 NOTE — TELEPHONE ENCOUNTER
Left detailed voicemail that we are cancelling surgery with Dr. Santacruz on 3/30 due to the COVID-19 concerns. Stated that we will call when we are able to reschedule as we are not able to do so at this time.    Noted that I am not a nurse and cannot answer any medical questions regarding this. Provided my direct number.

## 2020-03-16 NOTE — TELEPHONE ENCOUNTER
Pt left me a VM acknowledging the surgery being cancelled and asked that we keep them in the loop.

## 2020-05-01 ENCOUNTER — PREP FOR PROCEDURE (OUTPATIENT)
Dept: SURGERY | Facility: CLINIC | Age: 40
End: 2020-05-01

## 2020-05-01 ENCOUNTER — PATIENT OUTREACH (OUTPATIENT)
Dept: PLASTIC SURGERY | Facility: CLINIC | Age: 40
End: 2020-05-01

## 2020-05-01 DIAGNOSIS — F64.0 GENDER DYSPHORIA IN ADOLESCENT AND ADULT: Primary | ICD-10-CM

## 2020-05-01 NOTE — PROGRESS NOTES
Pt called with surgery scheduling questions. Pt Pt was one of the first to be cancelled due to COVID, initially scheduled for 3/30/20 top surgery w/ Dr. Santacruz.  Pt was understanding and wanted to review expected process for rescheduling. Pt was informed PA will likely need to be resubmitted since end date is 6/23/20.     Pt had question about starting testosterone prior to surgery. Question sent to Dr. Santacruz & RN care coordinator.     RIMA Mcfadden  Transgender Care Coordinator

## 2020-05-06 ENCOUNTER — VIRTUAL VISIT (OUTPATIENT)
Dept: OTHER | Facility: OUTPATIENT CENTER | Age: 40
End: 2020-05-06
Payer: COMMERCIAL

## 2020-05-06 DIAGNOSIS — F64.0 GENDER DYSPHORIA IN ADULT: Primary | ICD-10-CM

## 2020-05-06 NOTE — PROGRESS NOTES
[facility] -  Case Progress Note    Date of Service: 20   Legal Name: Roseanna Dorsey   Affirmed Name and pronouns: devon Schilling/tony  : 1980  Medical Record Number: [id number]  Treating Provider: Darline Bailon, PhD - postdoctoral fellow  Type of Session: Individual  Present in Session: Client  Number of Minutes:  30    Health Maintenance Summary - Mental Health Treatment Plan     Patient has no health maintenance due at this time        Video start time: 16:00  Video end time: 16:30    Telemedicine Visit: The patient's condition can be safely assessed and treated via synchronous audio and visual telemedicine encounter.      Reason for Telemedicine Visit: Service provided by telemedicine due to COVID-19 pandemic and [facility] directives.     Originating Site (Patient Location): Patient's home    Distant Site (Provider Location): Provider Remote Setting    Consent:  The patient has verbally consented to: the potential risks and benefits of telemedicine (video visit) versus in person care; bill my insurance or make self-payment for services provided; and responsibility for payment of non-covered services.     Mode of Communication:  Video Conference via Zhihu    As the provider I attest to compliance with applicable laws and regulations related to telemedicine.    Current Symptoms/Status:  Dysphoria: Discomfort with birth-assigned sex (female) and desire to live and be perceived as male.  Chest dysphoria is primary and top surgery will be rescheduled.     Progress Toward Treatment Goals:   Top surgery delayed due to Covid-19. In communication with Gender Care Coordinators  Shakir has begun using he/him pronouns  Continues weekly therapy with CECILIA Mclaughlin is interested in consult to begin masculinizing HRT.   A referral is being made to Dr. Chan for HRT evaluation. Coexisting psychological or social problems that could interfere with treatment  have been addressed, such that the client s situation and functioning are stable enough to start treatment. Client is able to communicate their gender identity, goals for hormone  therapy, and goals have been consistent. The client has sufficient mental capacity to estimate the consequences, risks and benefits of hormone therapy and give informed consent.    Intervention: Modality and Description:  Primary intervention was psychoeducation, and supportive and strengths-based psychotherapy towards Beck's gender goals.   Beck attended this appointment with a primary goal of discussing further transition steps. Beck's top surgery, initially scheduled 3/30/20, is deferred due to Covid-19. Beck shared that he has been working through his stress and disappointment around this in his primary therapy. Noted that surgery date initially  represented an important turning point in transition, after which Beck would feel comfortable adopting he/him pronouns and beginning HRT. Reported that through therapy and with support from wife and friends, Beck no longer feels like other transition steps must be delayed. Is interested in pursuing masculinizing hormone therapy. Discussed referral to Dr. Chan at this clinic.   Beck presented as open, engaged, and responsive to intervention.    Assignment:  None    DSM-5 Diagnoses:  302.85 Gender Dysphoria in Adolescents and Adults     Plan/Need for Future Services:  At this time, client continues regular care with primary mental health provider, and feels well supported towards their gender goals by their therapist, spouse/family, and friends. Client intends to follow up here periodically as needed, and will schedule appointments as needs arise.     Darline Bailon, PhD - Postdoctoral Fellow

## 2020-05-06 NOTE — Clinical Note
Hi Shakir Pope is a transmasculine client who uses he/him pronouns. He has primary therapy elsewhere and has seen me a few times to discuss medical transition and get LOS for top surgery. He contacted me again this week to discuss referrals for HRT. He does not feel the need for an information-only session to help him make a decision about HRT - he feels ready to proceed - but of course understands the limitations of what can be done via telehealth. Given delays in surgery, he is interested in getting other processes underway as much as is possible right now. No psychosocial or mental health concerns.

## 2020-05-08 NOTE — PROGRESS NOTES
"I did not personally see the patient.  I reviewed and agree with the assessment and plan as documented in this note.     Brenda \"Jp\" EDGAR Devries, Ph.D., Professor  [place] Licensed Psychologist  [place] Licensed Marriage & Family Therapist & State Approved Supervisor  "

## 2020-05-12 ENCOUNTER — TELEPHONE (OUTPATIENT)
Dept: OTHER | Facility: OUTPATIENT CENTER | Age: 40
End: 2020-05-12

## 2020-05-15 ENCOUNTER — TELEPHONE (OUTPATIENT)
Dept: SURGERY | Facility: CLINIC | Age: 40
End: 2020-05-15

## 2020-05-15 DIAGNOSIS — Z11.59 ENCOUNTER FOR SCREENING FOR OTHER VIRAL DISEASES: Primary | ICD-10-CM

## 2020-05-15 NOTE — TELEPHONE ENCOUNTER
Surgery is scheduled with Dr. Santacruz on 7/8 at [facility].  Scheduled per MD.    H&P: to be completed by PCP.  POST-OP: 7/14    The RN completed the education regarding the surgery.     The surgery packet was provided via [employer] that contains surgical instructions and a map.     They are aware that they will receive a call  ~2 days prior to the scheduled procedure and will be given an exact arrival/start time     I contacted the patient and spoke with the patient to confirm the scheduled dates.

## 2020-05-19 ENCOUNTER — TELEPHONE (OUTPATIENT)
Dept: OTHER | Facility: OUTPATIENT CENTER | Age: 40
End: 2020-05-19

## 2020-05-19 ENCOUNTER — TELEPHONE (OUTPATIENT)
Dept: SURGERY | Facility: CLINIC | Age: 40
End: 2020-05-19

## 2020-05-20 ENCOUNTER — DOCUMENTATION ONLY (OUTPATIENT)
Dept: OTHER | Facility: OUTPATIENT CENTER | Age: 40
End: 2020-05-20

## 2020-05-27 ENCOUNTER — TELEPHONE (OUTPATIENT)
Dept: SURGERY | Facility: CLINIC | Age: 40
End: 2020-05-27

## 2020-05-27 NOTE — TELEPHONE ENCOUNTER
received [facility] PA extension by fax, end date now 12/31/20-ref#[id number]

## 2020-06-08 ENCOUNTER — VIRTUAL VISIT (OUTPATIENT)
Dept: OTHER | Facility: OUTPATIENT CENTER | Age: 40
End: 2020-06-08
Payer: COMMERCIAL

## 2020-06-08 DIAGNOSIS — F64.0 GENDER DYSPHORIA IN ADOLESCENT AND ADULT: Primary | ICD-10-CM

## 2020-06-08 RX ORDER — TESTOSTERONE ENANTHATE 50 MG/.5ML
50 INJECTION SUBCUTANEOUS WEEKLY
Qty: 4 PEN | Refills: 1 | Status: SHIPPED | OUTPATIENT
Start: 2020-06-08 | End: 2020-06-23

## 2020-06-08 NOTE — PROGRESS NOTES
"Shakir Dorsey is a 40 year old adult who is being evaluated via a billable video visit.      The patient has been notified of following:     \"This video visit will be conducted via a call between you and your physician/provider. We have found that certain health care needs can be provided without the need for an in-person physical exam.  This service lets us provide the care you need with a video conversation.  If a prescription is necessary we can send it directly to your pharmacy.  If lab work is needed we can place an order for that and you can then stop by our lab to have the test done at a later time.    Video visits are billed at different rates depending on your insurance coverage.  Please reach out to your insurance provider with any questions.    If during the course of the call the physician/provider feels a video visit is not appropriate, you will not be charged for this service.\"    Patient has given verbal consent for Video visit? Yes        Patient would like the video invitation sent by: Send to e-mail at: [email or web address]    Will anyone else be joining your video visit? No    This is a transgender medical consultation at the request of Dr. Darline Bailon.      IDENTIFICATION:  A 40-year-old transmasculine patient.      CHIEF CONCERN:  Hormone therapy.      HISTORY OF PRESENT ILLNESS:  Patient has a longstanding history of gender dysphoria.  The diagnostic assessment of Dr. Bailon was reviewed.  Patient has no prior history of hormone use or gender-related surgeries.  His hormone goals are for binary masculine body changes in response to hormone therapy.      CURRENT MEDICAL PROBLEMS:  The patient has no chronic medical problems.      MEDICATIONS:  Vitamin D supplement.      ALLERGIES:  Penicillin and sulfa, according to reports when he was an infant.      PAST MEDICAL HISTORY:  Cholecystectomy.      FAMILY HISTORY:  Unknown, as the patient was adopted.      SOCIAL HISTORY:  Patient " eats a standard diet including dairy.  Walks 3-4 times a week as well as doing fitness training.  Has never smoked.  Alcohol once a month, 1-2 drinks per sitting.  No recreational substances.  The patient works for an [employer] company.      SEXUAL AND GYNECOLOGICAL HISTORY:  Patient has had prior regular menses with normal flow and minimal cramping.  He had a progesterone-based IUD placed in 2019.  Still has some mild irregular menses.   3, para 2 with 1 miscarriage, all births ended in normal spontaneous vaginal delivery with an 8-pound largest child, did have a postpartum hemorrhage after the first, no other pregnancy complications.  Children are ages 9 and 6-1/2.  Currently, has had 1 partner in the last year, gender female.  No history of STIs.  Has tested HIV negative in , also vaccinated for hepatitis B, unclear whether HPV vaccinated.  Last Pap was in 2019, which was negative.      REVIEW OF SYSTEMS:  A 12-point review of systems is negative throughout.  PHQ-9 is 3.  WENDI-7 is 3.      PHYSICAL EXAMINATION:  The patient is alert, in no apparent distress.  Patient shows no skin lesions or rashes.  Speech regular rate and rhythm.  Mood appears euthymic.  No psychomotor changes.  In reviewing chart, the patient underwent a preop physical exam on 2020 and this exam was reviewed during the course of our visit.  Blood pressure at that time was 112/72, pulse of 72, height 5 feet 6 inches, weight was 208 with a BMI of 33.5.  The entire physical exam including pelvic exam was essentially unremarkable as documented on 2020.        LABORATORY:  The patient also had labs within the last 6 months, which included a blood count.  These labs were performed on 2019, which included a hemoglobin which was within normal limits, lipid panel showing LDL of 127, HDL of 42, triglycerides 153, total cholesterol 200, hemoglobin A1c was normal and a comprehensive metabolic panel was  entirely within normal limits.      ASSESSMENT AND PLAN:   1.  Gender dysphoria.   The masculinizing effects of hormone therapy were discussed at length, along the variability of outcomes and general timeframe for expected masculinizing changes. Permanent vs semi-reversible changes were reviewed.   Reviewed that testosterone is an FDA controlled substance and that all prescriptions must be managed accordingly.  Patient was counseled regarding the potential risks and side effects of masculinizing therapy including:  - reduced fertility, reproductive options, need for ongoing contraception (if indicated) due to effects on developing fetus  -changes to sexual function including clitoral growth  -potential for weight gain, elevated cholesterol, and other indirect metabolic effects on blood pressure or glucose  -increased risk of erythrocytosis and rare risks associated with this including thrombosis   --changes to liver function tests  --mood changes, long term cardiovascular risks, potential undetermined cancer risks.    I discussed the possible risk of temporary or irreversible impairment of the fertility with the patient today. He demonstrated a complete understanding of the fertility preservation options and declined fertility preservation.      After reviewing labs and prior exam, no apparent contraindications to testosterone therapy. Discussed options to start, given unable to provide adequate injection instruction over video visit.    Will start Xyosted 50 mg subcutaneous weekly, using 1 month  coupon. Instructed in how to use as well as directed patient to online instructions.  Will send instructions/directions for standard injection techniques as well.       Follow-up 1 month    Video-Visit Details    Type of service:  Video Visit    Video Start Time: 8:02 AM  Video End Time: 8:55 AM    Originating Location (pt. Location): Home    Distant Location (provider location):  [facility]      Platform used for Video Visit: Brandy Chan MD

## 2020-06-16 NOTE — PROGRESS NOTES
"    SUBJECTIVE:                                                   Roseanna Dorsey is a 40 year old adult who presents to clinic today for the following health issue(s):  No chief complaint on file.      Additional information: ***    HPI:  ***    No LMP recorded. (Menstrual status: IUD)..     Patient {is/is not:618905::\"is\"} sexually active, .  Using {[facility] CONTRACEPTION:395940} for contraception.    reports that he has never smoked. He has never used smokeless tobacco.  {Tobacco Cessation -- Delete if patient is a non-smoker:182992}  STD testing offered?  {PLC GC/CHLAMYDIA:306117}    Health maintenance updated:  {yes no:757171}    Today's PHQ-2 Score:   PHQ-2 (  [employer]) 2020   Q1: Little interest or pleasure in doing things 0   Q2: Feeling down, depressed or hopeless 0   PHQ-2 Score 0   Q1: Little interest or pleasure in doing things -   Q2: Feeling down, depressed or hopeless -   PHQ-2 Score -     Today's PHQ-9 Score:   PHQ-9 SCORE 2019   PHQ-9 Total Score 1     Today's WENDI-7 Score:   WENDI-7 SCORE 2019   Total Score 0       Problem list and histories reviewed & adjusted, as indicated.  Additional history: as documented.    Patient Active Problem List   Diagnosis     Gender dysphoria in adult     Gender dysphoria in adolescent and adult     Past Surgical History:   Procedure Laterality Date     CHOLECYSTECTOMY  age 21     DILATION AND CURETTAGE SUCTION, TREAT INCOMPLETE   10/16/2012    Procedure: DILATION AND CURETTAGE SUCTION, TREAT INCOMPLETE ;  suction dilation and curettage;  Surgeon: Wilda Whitfield MD;  [facility]      Social History     Tobacco Use     Smoking status: Never Smoker     Smokeless tobacco: Never Used   Substance Use Topics     Alcohol use: Yes     Comment: Rare       Problem (# of Occurrences) Relation (Name,Age of Onset)    Unknown/Adopted (2) Mother, Father            Current Outpatient Medications   Medication Sig     Cholecalciferol " "(VITAMIN D3) 50 MCG (2000) TABS      levonorgestrel (MIRENA) 20 MCG/24HR IUD 1 each (20 mcg) by Intrauterine route once     Testosterone Enanthate (XYOSTED) 50 MG/0.5ML SOAJ Inject 50 mg Subcutaneous once a week     No current facility-administered medications for this visit.      Allergies   Allergen Reactions     Penicillins      Sulfa Drugs        ROS:  {[facility] ROSGYN:809665::\"12 point review of systems negative other than symptoms noted below or in the HPI.\"}  {ROS - :649789::\"No urinary frequency or dysuria, bladder or kidney problems\"}      OBJECTIVE:     There were no vitals taken for this visit.  There is no height or weight on file to calculate BMI.    Exam:  {[facility] EXAM CHOICES:865617}     In-Clinic Test Results:  No results found for this or any previous visit (from the past 24 hour(s)).    ASSESSMENT/PLAN:                                                      No diagnosis found.    There are no Patient Instructions on file for this visit.    ***    Wilda Whitfield MD  [address] [address]  [phone]  Dept: [phone]    PRE-OP EVALUATION:  Today's date: 2020    Roseanna Aniarsen Dorsey (: 1980) presents for pre-operative evaluation assessment as requested by  ***.  He requires evaluation and anesthesia risk assessment prior to undergoing surgery/procedure for treatment of *** .    {PREOP QUESTIONNAIRE OPTIONS (by MA):279741}    HPI:     HPI related to upcoming procedure: ***      {Ch. Problems:576090}    MEDICAL HISTORY:     Patient Active Problem List    Diagnosis Date Noted     Gender dysphoria in adolescent and adult 2020     Priority: Medium     Added automatically from request for surgery [id number]       Gender dysphoria in adult 2019     Priority: Medium      Past Medical History:   Diagnosis Date     Anemia     PPH with last pregnancy     Gender dysphoria in adult " "2019     Presence of of 52 mg levonorgestrel-releasing intrauterine device (IUD) 2019    placed by Roseann to attempt amenorrhea until hyst can be done     Past Surgical History:   Procedure Laterality Date     CHOLECYSTECTOMY  age 21     DILATION AND CURETTAGE SUCTION, TREAT INCOMPLETE   10/16/2012    Procedure: DILATION AND CURETTAGE SUCTION, TREAT INCOMPLETE ;  suction dilation and curettage;  Surgeon: Wilda Whitfield MD;  Location: [facility]     Current Outpatient Medications   Medication Sig Dispense Refill     Cholecalciferol (VITAMIN D3) 50 MCG (2000 UT) TABS        levonorgestrel (MIRENA) 20 MCG/24HR IUD 1 each (20 mcg) by Intrauterine route once       Testosterone Enanthate (XYOSTED) 50 MG/0.5ML SOAJ Inject 50 mg Subcutaneous once a week 4 pen 1     OTC products: {OTC ANALGESICS:057785}    Allergies   Allergen Reactions     Penicillins      Sulfa Drugs       Latex Allergy: {YES/NO WITH DEFAULT:636273::\"NO\"}    Social History     Tobacco Use     Smoking status: Never Smoker     Smokeless tobacco: Never Used   Substance Use Topics     Alcohol use: Yes     Comment: Rare      History   Drug Use No       REVIEW OF SYSTEMS:   {ROS Preop Choices:151235}    EXAM:   There were no vitals taken for this visit.  {EXAM Preop Choices:203643}    DIAGNOSTICS:   {DIAGNOSTIC FOR PREOP:986302}    Recent Labs   Lab Test 20  1631 19  0907 19  1159   HGB 13.4 12.9 15.9*    257 327   NA  --  138 136   POTASSIUM  --  4.1 4.7   CR  --  0.77 0.92   A1C  --  5.3  --         IMPRESSION:   {PREOP REASONS:031305::\"Reason for surgery/procedure: ***\",\"Diagnosis/reason for consult: ***\"}    The proposed surgical procedure is considered {HIGH=major cardiovascular or procedures requiring prolonged anesthesia >4 hours or large fluid shifts;    INTERMEDIATE=abdominal, most orthopedic and intrathoracic surgery; LOW= endoscopy, cataract and breast surgery:944298} risk.    REVISED CARDIAC RISK " "INDEX  The patient has the following serious cardiovascular risks for perioperative complications such as (MI, PE, VFib and 3  AV Block):  {PREOP REVISED CARDIAC INDEX (RCI):992298:p:\"No serious cardiac risks\"}  INTERPRETATION: {REVISED CARDIAC RISK INTERPRETATION:374818}    The patient has the following additional risks for perioperative complications:  {Additional perioperative risks:499722:p:\"No identified additional risks\"}      ICD-10-CM    1. Preop general physical exam  Z01.818        RECOMMENDATIONS:     {IMPORTANT - Conditions - complete carefully!!:701984}    {IMPORTANT - Medications:044340::\"--Patient is to take all scheduled medications on the day of surgery EXCEPT for modifications listed below.\"}    {IMPORTANT - Approval:472942:p:\"APPROVAL GIVEN to proceed with proposed procedure, without further diagnostic evaluation\"}       Signed Electronically by: Wilda Whitfield MD    Copy of this evaluation report is provided to requesting physician.    [facility] Preop Guidelines    Revised Cardiac Risk Index  "

## 2020-06-17 ENCOUNTER — OFFICE VISIT (OUTPATIENT)
Dept: OBGYN | Facility: CLINIC | Age: 40
End: 2020-06-17
Payer: COMMERCIAL

## 2020-06-17 VITALS
WEIGHT: 206.6 LBS | BODY MASS INDEX: 33.2 KG/M2 | DIASTOLIC BLOOD PRESSURE: 80 MMHG | SYSTOLIC BLOOD PRESSURE: 128 MMHG | HEART RATE: 84 BPM | HEIGHT: 66 IN

## 2020-06-17 DIAGNOSIS — Z01.818 PREOP GENERAL PHYSICAL EXAM: Primary | ICD-10-CM

## 2020-06-17 DIAGNOSIS — F64.0 GENDER DYSPHORIA IN ADULT: ICD-10-CM

## 2020-06-17 PROCEDURE — 99214 OFFICE O/P EST MOD 30 MIN: CPT | Performed by: OBSTETRICS & GYNECOLOGY

## 2020-06-17 ASSESSMENT — ANXIETY QUESTIONNAIRES
1. FEELING NERVOUS, ANXIOUS, OR ON EDGE: NOT AT ALL
6. BECOMING EASILY ANNOYED OR IRRITABLE: NOT AT ALL
2. NOT BEING ABLE TO STOP OR CONTROL WORRYING: NOT AT ALL
5. BEING SO RESTLESS THAT IT IS HARD TO SIT STILL: NOT AT ALL
7. FEELING AFRAID AS IF SOMETHING AWFUL MIGHT HAPPEN: NOT AT ALL
GAD7 TOTAL SCORE: 0
3. WORRYING TOO MUCH ABOUT DIFFERENT THINGS: NOT AT ALL

## 2020-06-17 ASSESSMENT — MIFFLIN-ST. JEOR: SCORE: 1633.88

## 2020-06-17 ASSESSMENT — PATIENT HEALTH QUESTIONNAIRE - PHQ9
SUM OF ALL RESPONSES TO PHQ QUESTIONS 1-9: 1
5. POOR APPETITE OR OVEREATING: NOT AT ALL

## 2020-06-17 NOTE — PROGRESS NOTES
[facility] [place]  [address]  [place] [address]  [phone]  Dept: [phone]    PRE-OP EVALUATION:  Today's date: 2020    Roseanna Dorsey (: 1980) presents for pre-operative evaluation assessment as requested by Dr. Santacruz.  He requires evaluation and anesthesia risk assessment prior to undergoing surgery/procedure for treatment of Bilateral simple mastectomy, nipple grafts .    Proposed Surgery/ Procedure: Bilateral Simple mastectomy & nipple grafts  Date of Surgery/ Procedure: 2020  Time of Surgery/ Procedure: 10:30 AM  Hospital/Surgical Facility: [facility]  Fax number for surgical facility: / health facility  Primary Physician: Wilda Whitfield  Type of Anesthesia Anticipated: General    Patient has a Health Care Directive or Living Will:  YES     1. NO - Do you have a history of heart attack, stroke, stent, bypass or surgery on an artery in the head, neck, heart or legs?  2. NO - Do you ever have any pain or discomfort in your chest?  3. NO - Do you have a history of  Heart Failure?  4. NO - Are you troubled by shortness of breath when: walking on the level, up a slight hill or at night?  5. NO - Do you currently have a cold, bronchitis or other respiratory infection?  6. NO - Do you have a cough, shortness of breath or wheezing?  7. NO - Do you sometimes get pains in the calves of your legs when you walk?  8. NO - Do you or anyone in your family have previous history of blood clots?  9. NO - Do you or does anyone in your family have a serious bleeding problem such as prolonged bleeding following surgeries or cuts?  10. NO - Have you ever had problems with anemia or been told to take iron pills?  11. NO - Have you had any abnormal blood loss such as black, tarry or bloody stools, or abnormal vaginal bleeding?  12. NO - Have you ever had a blood transfusion?  13. NO - Have you or any of your relatives ever had problems with  anesthesia?  14. NO - Do you have sleep apnea, excessive snoring or daytime drowsiness?  15. NO - Do you have any prosthetic heart valves?  16. NO - Do you have prosthetic joints?  17. NO - Is there any chance that you may be pregnant?      HPI:     HPI related to upcoming procedure: patient with gender dysphoria disorder beginning the process of gender reassignment. Having top surgery on 22  Was supposed to have done this in march but got delayed due to covid and now is rescheduled  Since last H&P the only change is that he has started on testosterone and really happy about that  Has more energy, more libido and starting to feel like throat/voice a bit raspier and more voice fatigue with talking. No other negative effects or issues        See problem list for active medical problems.  Problems all longstanding and stable, except as noted/documented.  See ROS for pertinent symptoms related to these conditions.      MEDICAL HISTORY:     Patient Active Problem List    Diagnosis Date Noted     Gender dysphoria in adolescent and adult 2020     Priority: Medium     Added automatically from request for surgery [id number]       Gender dysphoria in adult 2019     Priority: Medium      Past Medical History:   Diagnosis Date     Anemia     PPH with last pregnancy     Gender dysphoria in adult 2019     Presence of of 52 mg levonorgestrel-releasing intrauterine device (IUD) 2019    placed by Roseann to attempt amenorrhea until hyst can be done     Past Surgical History:   Procedure Laterality Date     CHOLECYSTECTOMY  age 21     DILATION AND CURETTAGE SUCTION, TREAT INCOMPLETE   10/16/2012    Procedure: DILATION AND CURETTAGE SUCTION, TREAT INCOMPLETE ;  suction dilation and curettage;  Surgeon: Wilda Whitfield MD;  Location: [facility]     Current Outpatient Medications   Medication Sig Dispense Refill     Cholecalciferol (VITAMIN D3) 50 MCG (2000) TABS        levonorgestrel (MIRENA) 20  "MCG/24HR IUD 1 each (20 mcg) by Intrauterine route once       Testosterone Enanthate (XYOSTED) 50 MG/0.5ML SOAJ Inject 50 mg Subcutaneous once a week 4 pen 1     OTC products: no recent use of OTC ASA, NSAIDS or Steroids    Allergies   Allergen Reactions     Penicillins      Sulfa Drugs       Latex Allergy: NO    Social History     Tobacco Use     Smoking status: Never Smoker     Smokeless tobacco: Never Used   Substance Use Topics     Alcohol use: Yes     Comment: Rare      History   Drug Use No       REVIEW OF SYSTEMS:   CONSTITUTIONAL: NEGATIVE for fever, chills, change in weight  INTEGUMENTARY/SKIN: NEGATIVE for worrisome rashes, moles or lesions  EYES: NEGATIVE for vision changes or irritation  ENT/MOUTH: NEGATIVE for ear, mouth and throat problems  RESP: NEGATIVE for significant cough or SOB  BREAST: NEGATIVE for masses, tenderness or discharge  CV: NEGATIVE for chest pain, palpitations or peripheral edema  GI: NEGATIVE for nausea, abdominal pain, heartburn, or change in bowel habits  : NEGATIVE for frequency, dysuria, or hematuria   female: irregular vaginal bleeding  MUSCULOSKELETAL: NEGATIVE for significant arthralgias or myalgia  NEURO: NEGATIVE for weakness, dizziness or paresthesias  ENDOCRINE: NEGATIVE for temperature intolerance, skin/hair changes  HEME: NEGATIVE for bleeding problems  PSYCHIATRIC: NEGATIVE for changes in mood or affect    EXAM:   /80 (BP Location: Right arm, Patient Position: Sitting, Cuff Size: Adult Large)   Pulse 84   Ht 1.676 m (5' 6\")   Wt 93.7 kg (206 lb 9.6 oz)   LMP 06/02/2020   BMI 33.35 kg/m      GENERAL APPEARANCE: healthy, alert and no distress     EYES: EOMI, PERRL     HENT: ear canals and TM's normal and nose and mouth without ulcers or lesions     NECK: no adenopathy, no asymmetry, masses, or scars and thyroid normal to palpation     RESP: lungs clear to auscultation - no rales, rhonchi or wheezes     CV: regular rates and rhythm, normal S1 S2, no S3 or " S4 and no murmur, click or rub     ABDOMEN:  soft, nontender, no HSM or masses and bowel sounds normal     MS: extremities normal- no gross deformities noted, no evidence of inflammation in joints, FROM in all extremities.     SKIN: no suspicious lesions or rashes     NEURO: Normal strength and tone, sensory exam grossly normal, mentation intact and speech normal     PSYCH: mentation appears normal. and affect normal/bright     LYMPHATICS: No cervical adenopathy    DIAGNOSTICS:   No labs or EKG required for low risk surgery (cataract, skin procedure, breast biopsy, etc)    Recent Labs   Lab Test 03/06/20  1631 12/17/19  0907 01/29/19  1159   HGB 13.4 12.9 15.9*    257 327   NA  --  138 136   POTASSIUM  --  4.1 4.7   CR  --  0.77 0.92   A1C  --  5.3  --         IMPRESSION:   Reason for surgery/procedure: gender dysphoria  Diagnosis/reason for consult: preop for top surgery/mastectomy    The proposed surgical procedure is considered INTERMEDIATE risk.    REVISED CARDIAC RISK INDEX  The patient has the following serious cardiovascular risks for perioperative complications such as (MI, PE, VFib and 3  AV Block):  No serious cardiac risks  INTERPRETATION: 0 risks: Class I (very low risk - 0.4% complication rate)    The patient has the following additional risks for perioperative complications:  No identified additional risks  Testosterone with some peripheral conversion to estrogen possibly causing slight increase in VTE risk but not significantly above baseline      ICD-10-CM    1. Preop general physical exam  Z01.818    2. Gender dysphoria in adult  F64.0        RECOMMENDATIONS:       --Patient is to take all scheduled medications on the day of surgery EXCEPT for modifications listed below.    APPROVAL GIVEN to proceed with proposed procedure, without further diagnostic evaluation       Signed Electronically by: Wilda Whitfield MD    Copy of this evaluation report is provided to requesting physician.    [facility]  Preop Guidelines    Revised Cardiac Risk Index

## 2020-06-18 ASSESSMENT — ANXIETY QUESTIONNAIRES: GAD7 TOTAL SCORE: 0

## 2020-06-23 ENCOUNTER — VIRTUAL VISIT (OUTPATIENT)
Dept: OTHER | Facility: OUTPATIENT CENTER | Age: 40
End: 2020-06-23
Payer: COMMERCIAL

## 2020-06-23 DIAGNOSIS — F64.0 GENDER DYSPHORIA IN ADOLESCENT AND ADULT: Primary | ICD-10-CM

## 2020-06-23 RX ORDER — TESTOSTERONE ENANTHATE 50 MG/.5ML
50 INJECTION SUBCUTANEOUS WEEKLY
Qty: 4 PEN | Refills: 2 | Status: SHIPPED | OUTPATIENT
Start: 2020-06-23 | End: 2020-10-27

## 2020-06-23 ASSESSMENT — ENCOUNTER SYMPTOMS
FEVER: 0
UNEXPECTED WEIGHT CHANGE: 0
PALPITATIONS: 0
WEAKNESS: 0
FREQUENCY: 0
LIGHT-HEADEDNESS: 0
NUMBNESS: 0
CHEST TIGHTNESS: 0
CHILLS: 0
POLYDIPSIA: 0
HEADACHES: 0
DYSPHORIC MOOD: 0
ABDOMINAL PAIN: 0
VOMITING: 0
SHORTNESS OF BREATH: 0
NERVOUS/ANXIOUS: 0

## 2020-06-23 NOTE — PROGRESS NOTES
"The patient has been notified of following:     \"This video visit will be conducted via a call between you and your physician/provider. We have found that certain health care needs can be provided without the need for an in-person physical exam.  This service lets us provide the care you need with a video conversation.  If a prescription is necessary we can send it directly to your pharmacy.  If lab work is needed we can place an order for that and you can then stop by our lab to have the test done at a later time.    If during the course of the call the physician/provider feels a video visit is not appropriate, you will not be charged for this service.\"     Patient has given verbal consent for Video visit? Yes    Patient would like the video invitation sent by: Send to e-mail at: [email or web address]    Video Start Time: 4:45 PM              ZOEY Schilling is a 40 year old individual that uses pronouns He/Him/His/Himself that presents today for follow up of:  masculinizing hormone therapy.   Alone or accompanied by: accompanied today by self  Gender identity: male  Started Hormone  therapy  2020  Continues on Other xyosted 50 SQ weekly*.   Any special concerns today?    Xyosted going well, no problems with injections      On hormones?  YES +++ Shot day of the week?       Due for labs?  Yes      +++ Refills of meds needed?  Yes  Gender related body changes since last visit:   A little more sweaty, oily  Voice a little scratchy  Energy increased   Appetite about same      Breakthrough bleeding? No: no changes with IUD    New health concerns since last visit:  ---none    Past Surgical History:   Procedure Laterality Date     CHOLECYSTECTOMY  age 21     DILATION AND CURETTAGE SUCTION, TREAT INCOMPLETE   10/16/2012    Procedure: DILATION AND CURETTAGE SUCTION, TREAT INCOMPLETE ;  suction dilation and curettage;  Surgeon: Wilda Whitfield MD;  Location: [facility]       Patient Active Problem List "   Diagnosis     Gender dysphoria in adult     Gender dysphoria in adolescent and adult       Current Outpatient Medications   Medication Sig Dispense Refill     Cholecalciferol (VITAMIN D3) 50 MCG (2000 UT) TABS        levonorgestrel (MIRENA) 20 MCG/24HR IUD 1 each (20 mcg) by Intrauterine route once       Testosterone Enanthate (XYOSTED) 50 MG/0.5ML SOAJ Inject 50 mg Subcutaneous once a week 4 pen 1       History   Smoking Status     Never Smoker   Smokeless Tobacco     Never Used          Allergies   Allergen Reactions     Penicillins      Sulfa Drugs        There are no preventive care reminders to display for this patient.      Problem, Medication and Allergy Lists were reviewed and are current..         Review of Systems:   Review of Systems   Constitutional: Negative for chills, fever and unexpected weight change.   Eyes: Negative for visual disturbance.   Respiratory: Negative for chest tightness and shortness of breath.    Cardiovascular: Negative for chest pain, palpitations and leg swelling.   Gastrointestinal: Negative for abdominal pain and vomiting.   Endocrine: Negative for polydipsia and polyuria.   Genitourinary: Negative for frequency.   Neurological: Negative for weakness, light-headedness, numbness and headaches.   Psychiatric/Behavioral: Negative for dysphoric mood and suicidal ideas. The patient is not nervous/anxious.               Labs:   Results from last visit:  Office Visit on 03/06/2020   Component Date Value Ref Range Status     WBC 03/06/2020 9.6  4.0 - 11.0 10e9/L Final     RBC Count 03/06/2020 4.56  3.8 - 5.2 10e12/L Final     Hemoglobin 03/06/2020 13.4  11.7 - 15.7 g/dL Final     Hematocrit 03/06/2020 40.5  35.0 - 47.0 % Final     MCV 03/06/2020 89  78 - 100 fl Final     MCH 03/06/2020 29.4  26.5 - 33.0 pg Final     MCHC 03/06/2020 33.1  31.5 - 36.5 g/dL Final     RDW 03/06/2020 12.8  10.0 - 15.0 % Final     Platelet Count 03/06/2020 253  150 - 450 10e9/L Final          EXAM:  Constitutional: healthy, alert and no distress   Psychiatric: mentation appears normal and affect normal/bright   Skin clear  Assessment and Plan   1. Gender dysphoria  Clinically doing well on current dose Xyosted; discussed options in terms of insurance coverage, cost and preference.  Pt. Will be having top surgery in July so will stick with Xyosted for now,  and discussed doing subcutaneous generic tstosterone if not affordable afterwards  Lab: Testosterone level 3-4 days after injection      Follow up:  Follow up in 3 months.        Video-Visit Details    Type of service:  Video Visit    Video End Time (time video stopped): 504    Originating Location (pt. Location): Home    Distant Location (provider location):  [facility]     Mode of Communication:  Video Conference via Doxy    Fam Chan MD        [facility]  Questions were elicited and answered.     Fam Chan MD

## 2020-06-30 ASSESSMENT — ANXIETY QUESTIONNAIRES
1. FEELING NERVOUS, ANXIOUS, OR ON EDGE: SEVERAL DAYS
5. BEING SO RESTLESS THAT IT IS HARD TO SIT STILL: NOT AT ALL
3. WORRYING TOO MUCH ABOUT DIFFERENT THINGS: NOT AT ALL
2. NOT BEING ABLE TO STOP OR CONTROL WORRYING: NOT AT ALL
7. FEELING AFRAID AS IF SOMETHING AWFUL MIGHT HAPPEN: NOT AT ALL
GAD7 TOTAL SCORE: 3
6. BECOMING EASILY ANNOYED OR IRRITABLE: SEVERAL DAYS

## 2020-06-30 ASSESSMENT — PATIENT HEALTH QUESTIONNAIRE - PHQ9
SUM OF ALL RESPONSES TO PHQ QUESTIONS 1-9: 3
5. POOR APPETITE OR OVEREATING: SEVERAL DAYS

## 2020-07-01 DIAGNOSIS — F64.0 GENDER DYSPHORIA IN ADOLESCENT AND ADULT: ICD-10-CM

## 2020-07-01 PROCEDURE — 36415 COLL VENOUS BLD VENIPUNCTURE: CPT | Performed by: OBSTETRICS & GYNECOLOGY

## 2020-07-01 PROCEDURE — 84270 ASSAY OF SEX HORMONE GLOBUL: CPT | Performed by: OBSTETRICS & GYNECOLOGY

## 2020-07-01 PROCEDURE — 84403 ASSAY OF TOTAL TESTOSTERONE: CPT | Performed by: OBSTETRICS & GYNECOLOGY

## 2020-07-01 ASSESSMENT — ANXIETY QUESTIONNAIRES: GAD7 TOTAL SCORE: 3

## 2020-07-03 LAB
SHBG SERPL-SCNC: 21 NMOL/L (ref 30–135)
TESTOST FREE SERPL-MCNC: 9.53 NG/DL (ref 0.13–0.92)
TESTOST SERPL-MCNC: 374 NG/DL (ref 8–60)

## 2020-07-04 ENCOUNTER — MYC MEDICAL ADVICE (OUTPATIENT)
Dept: OTHER | Facility: OUTPATIENT CENTER | Age: 40
End: 2020-07-04

## 2020-07-05 DIAGNOSIS — Z11.59 ENCOUNTER FOR SCREENING FOR OTHER VIRAL DISEASES: ICD-10-CM

## 2020-07-06 ENCOUNTER — MYC MEDICAL ADVICE (OUTPATIENT)
Dept: OTHER | Facility: OUTPATIENT CENTER | Age: 40
End: 2020-07-06

## 2020-07-06 DIAGNOSIS — F64.0 GENDER DYSPHORIA IN ADOLESCENT AND ADULT: Primary | ICD-10-CM

## 2020-07-06 LAB
SARS-COV-2 PCR COMMENT: NORMAL
SARS-COV-2 RNA SPEC QL NAA+PROBE: NEGATIVE
SARS-COV-2 RNA SPEC QL NAA+PROBE: NORMAL
SPECIMEN SOURCE: NORMAL
SPECIMEN SOURCE: NORMAL

## 2020-07-07 ENCOUNTER — ANESTHESIA EVENT (OUTPATIENT)
Dept: SURGERY | Facility: CLINIC | Age: 40
End: 2020-07-07
Payer: COMMERCIAL

## 2020-07-07 RX ORDER — CLINDAMYCIN PHOSPHATE 900 MG/50ML
900 INJECTION, SOLUTION INTRAVENOUS
Status: CANCELLED | OUTPATIENT
Start: 2020-07-07

## 2020-07-07 RX ORDER — CLINDAMYCIN PHOSPHATE 900 MG/50ML
900 INJECTION, SOLUTION INTRAVENOUS SEE ADMIN INSTRUCTIONS
Status: CANCELLED | OUTPATIENT
Start: 2020-07-07

## 2020-07-07 RX ORDER — TESTOSTERONE ENANTHATE 75 MG/.5ML
75 INJECTION SUBCUTANEOUS WEEKLY
Qty: 4 PEN | Refills: 3 | Status: SHIPPED | OUTPATIENT
Start: 2020-07-07 | End: 2020-10-27

## 2020-07-08 ENCOUNTER — ANESTHESIA (OUTPATIENT)
Dept: SURGERY | Facility: CLINIC | Age: 40
End: 2020-07-08
Payer: COMMERCIAL

## 2020-07-08 ENCOUNTER — HOSPITAL ENCOUNTER (OUTPATIENT)
Facility: CLINIC | Age: 40
Discharge: HOME | End: 2020-07-09
Attending: SURGERY | Admitting: SURGERY
Payer: COMMERCIAL

## 2020-07-08 DIAGNOSIS — F64.0 GENDER DYSPHORIA IN ADULT: Primary | ICD-10-CM

## 2020-07-08 PROBLEM — F64.9 GENDER DYSPHORIA: Status: ACTIVE | Noted: 2020-07-08

## 2020-07-08 LAB
GLUCOSE SERPL-MCNC: 86 MG/DL (ref 70–99)
HGB BLD-MCNC: 14.4 G/DL (ref 11.7–15.7)

## 2020-07-08 PROCEDURE — 25000125 ZZHC RX 250: Performed by: SURGERY

## 2020-07-08 PROCEDURE — 27210794 ZZH OR GENERAL SUPPLY STERILE: Performed by: SURGERY

## 2020-07-08 PROCEDURE — 25800030 ZZH RX IP 258 OP 636: Performed by: NURSE ANESTHETIST, CERTIFIED REGISTERED

## 2020-07-08 PROCEDURE — 25000128 H RX IP 250 OP 636: Performed by: NURSE ANESTHETIST, CERTIFIED REGISTERED

## 2020-07-08 PROCEDURE — 25000125 ZZHC RX 250: Performed by: NURSE ANESTHETIST, CERTIFIED REGISTERED

## 2020-07-08 PROCEDURE — 88305 TISSUE EXAM BY PATHOLOGIST: CPT | Mod: 26 | Performed by: SURGERY

## 2020-07-08 PROCEDURE — 27110038 ZZH RX 271: Performed by: SURGERY

## 2020-07-08 PROCEDURE — 40000170 ZZH STATISTIC PRE-PROCEDURE ASSESSMENT II: Performed by: SURGERY

## 2020-07-08 PROCEDURE — 71000015 ZZH RECOVERY PHASE 1 LEVEL 2 EA ADDTL HR: Performed by: SURGERY

## 2020-07-08 PROCEDURE — 25000128 H RX IP 250 OP 636: Performed by: ANESTHESIOLOGY

## 2020-07-08 PROCEDURE — 82947 ASSAY GLUCOSE BLOOD QUANT: CPT | Performed by: ANESTHESIOLOGY

## 2020-07-08 PROCEDURE — 25000566 ZZH SEVOFLURANE, EA 15 MIN: Performed by: SURGERY

## 2020-07-08 PROCEDURE — 88305 TISSUE EXAM BY PATHOLOGIST: CPT | Performed by: SURGERY

## 2020-07-08 PROCEDURE — 36000059 ZZH SURGERY LEVEL 3 EA 15 ADDTL MIN UMMC: Performed by: SURGERY

## 2020-07-08 PROCEDURE — 36000057 ZZH SURGERY LEVEL 3 1ST 30 MIN - UMMC: Performed by: SURGERY

## 2020-07-08 PROCEDURE — 36415 COLL VENOUS BLD VENIPUNCTURE: CPT | Performed by: ANESTHESIOLOGY

## 2020-07-08 PROCEDURE — 25000132 ZZH RX MED GY IP 250 OP 250 PS 637: Performed by: ANESTHESIOLOGY

## 2020-07-08 PROCEDURE — 37000009 ZZH ANESTHESIA TECHNICAL FEE, EACH ADDTL 15 MIN: Performed by: SURGERY

## 2020-07-08 PROCEDURE — 71000014 ZZH RECOVERY PHASE 1 LEVEL 2 FIRST HR: Performed by: SURGERY

## 2020-07-08 PROCEDURE — 37000008 ZZH ANESTHESIA TECHNICAL FEE, 1ST 30 MIN: Performed by: SURGERY

## 2020-07-08 PROCEDURE — 85018 HEMOGLOBIN: CPT | Performed by: ANESTHESIOLOGY

## 2020-07-08 PROCEDURE — 25800030 ZZH RX IP 258 OP 636: Performed by: ANESTHESIOLOGY

## 2020-07-08 PROCEDURE — 71000027 ZZH RECOVERY PHASE 2 EACH 15 MINS: Performed by: SURGERY

## 2020-07-08 RX ORDER — SODIUM CHLORIDE, SODIUM LACTATE, POTASSIUM CHLORIDE, CALCIUM CHLORIDE 600; 310; 30; 20 MG/100ML; MG/100ML; MG/100ML; MG/100ML
INJECTION, SOLUTION INTRAVENOUS CONTINUOUS
Status: DISCONTINUED | OUTPATIENT
Start: 2020-07-08 | End: 2020-07-09 | Stop reason: HOSPADM

## 2020-07-08 RX ORDER — PROPOFOL 10 MG/ML
INJECTION, EMULSION INTRAVENOUS PRN
Status: DISCONTINUED | OUTPATIENT
Start: 2020-07-08 | End: 2020-07-08

## 2020-07-08 RX ORDER — FENTANYL CITRATE 50 UG/ML
25-50 INJECTION, SOLUTION INTRAMUSCULAR; INTRAVENOUS
Status: DISCONTINUED | OUTPATIENT
Start: 2020-07-08 | End: 2020-07-09 | Stop reason: HOSPADM

## 2020-07-08 RX ORDER — LIDOCAINE HYDROCHLORIDE 20 MG/ML
INJECTION, SOLUTION INFILTRATION; PERINEURAL PRN
Status: DISCONTINUED | OUTPATIENT
Start: 2020-07-08 | End: 2020-07-08

## 2020-07-08 RX ORDER — MAGNESIUM HYDROXIDE 1200 MG/15ML
LIQUID ORAL PRN
Status: DISCONTINUED | OUTPATIENT
Start: 2020-07-08 | End: 2020-07-09 | Stop reason: HOSPADM

## 2020-07-08 RX ORDER — SODIUM CHLORIDE, SODIUM LACTATE, POTASSIUM CHLORIDE, CALCIUM CHLORIDE 600; 310; 30; 20 MG/100ML; MG/100ML; MG/100ML; MG/100ML
INJECTION, SOLUTION INTRAVENOUS CONTINUOUS
Status: DISCONTINUED | OUTPATIENT
Start: 2020-07-08 | End: 2020-07-08 | Stop reason: HOSPADM

## 2020-07-08 RX ORDER — LIDOCAINE 40 MG/G
CREAM TOPICAL
Status: DISCONTINUED | OUTPATIENT
Start: 2020-07-08 | End: 2020-07-08 | Stop reason: HOSPADM

## 2020-07-08 RX ORDER — MEPERIDINE HYDROCHLORIDE 25 MG/ML
12.5 INJECTION INTRAMUSCULAR; INTRAVENOUS; SUBCUTANEOUS
Status: DISCONTINUED | OUTPATIENT
Start: 2020-07-08 | End: 2020-07-09 | Stop reason: HOSPADM

## 2020-07-08 RX ORDER — FENTANYL CITRATE 50 UG/ML
INJECTION, SOLUTION INTRAMUSCULAR; INTRAVENOUS PRN
Status: DISCONTINUED | OUTPATIENT
Start: 2020-07-08 | End: 2020-07-08

## 2020-07-08 RX ORDER — HYDROXYZINE HYDROCHLORIDE 25 MG/1
25 TABLET, FILM COATED ORAL 3 TIMES DAILY PRN
Qty: 15 TABLET | Refills: 0 | Status: SHIPPED | OUTPATIENT
Start: 2020-07-08 | End: 2020-08-25

## 2020-07-08 RX ORDER — GINSENG 100 MG
CAPSULE ORAL PRN
Status: DISCONTINUED | OUTPATIENT
Start: 2020-07-08 | End: 2020-07-09 | Stop reason: HOSPADM

## 2020-07-08 RX ORDER — OXYCODONE HYDROCHLORIDE 10 MG/1
5-10 TABLET ORAL EVERY 6 HOURS PRN
Qty: 20 TABLET | Refills: 0 | Status: SHIPPED | OUTPATIENT
Start: 2020-07-08 | End: 2020-08-25

## 2020-07-08 RX ORDER — AZITHROMYCIN 250 MG/1
TABLET, FILM COATED ORAL
Qty: 6 TABLET | Refills: 0 | Status: SHIPPED | OUTPATIENT
Start: 2020-07-08 | End: 2020-07-13

## 2020-07-08 RX ORDER — NALOXONE HYDROCHLORIDE 0.4 MG/ML
.1-.4 INJECTION, SOLUTION INTRAMUSCULAR; INTRAVENOUS; SUBCUTANEOUS
Status: DISCONTINUED | OUTPATIENT
Start: 2020-07-08 | End: 2020-07-09 | Stop reason: HOSPADM

## 2020-07-08 RX ORDER — ONDANSETRON 2 MG/ML
4 INJECTION INTRAMUSCULAR; INTRAVENOUS EVERY 30 MIN PRN
Status: DISCONTINUED | OUTPATIENT
Start: 2020-07-08 | End: 2020-07-09 | Stop reason: HOSPADM

## 2020-07-08 RX ORDER — DEXAMETHASONE SODIUM PHOSPHATE 4 MG/ML
INJECTION, SOLUTION INTRA-ARTICULAR; INTRALESIONAL; INTRAMUSCULAR; INTRAVENOUS; SOFT TISSUE PRN
Status: DISCONTINUED | OUTPATIENT
Start: 2020-07-08 | End: 2020-07-08

## 2020-07-08 RX ORDER — ONDANSETRON 2 MG/ML
INJECTION INTRAMUSCULAR; INTRAVENOUS PRN
Status: DISCONTINUED | OUTPATIENT
Start: 2020-07-08 | End: 2020-07-08

## 2020-07-08 RX ORDER — HYDROMORPHONE HYDROCHLORIDE 1 MG/ML
.3-.5 INJECTION, SOLUTION INTRAMUSCULAR; INTRAVENOUS; SUBCUTANEOUS EVERY 10 MIN PRN
Status: DISCONTINUED | OUTPATIENT
Start: 2020-07-08 | End: 2020-07-09 | Stop reason: HOSPADM

## 2020-07-08 RX ORDER — ESMOLOL HYDROCHLORIDE 10 MG/ML
INJECTION INTRAVENOUS PRN
Status: DISCONTINUED | OUTPATIENT
Start: 2020-07-08 | End: 2020-07-08

## 2020-07-08 RX ORDER — CLINDAMYCIN PHOSPHATE 900 MG/50ML
900 INJECTION, SOLUTION INTRAVENOUS
Status: COMPLETED | OUTPATIENT
Start: 2020-07-08 | End: 2020-07-08

## 2020-07-08 RX ORDER — OXYCODONE HYDROCHLORIDE 5 MG/1
5 TABLET ORAL
Status: COMPLETED | OUTPATIENT
Start: 2020-07-08 | End: 2020-07-08

## 2020-07-08 RX ORDER — ONDANSETRON 4 MG/1
4 TABLET, ORALLY DISINTEGRATING ORAL EVERY 30 MIN PRN
Status: DISCONTINUED | OUTPATIENT
Start: 2020-07-08 | End: 2020-07-09 | Stop reason: HOSPADM

## 2020-07-08 RX ORDER — CLINDAMYCIN PHOSPHATE 900 MG/50ML
900 INJECTION, SOLUTION INTRAVENOUS SEE ADMIN INSTRUCTIONS
Status: DISCONTINUED | OUTPATIENT
Start: 2020-07-08 | End: 2020-07-08 | Stop reason: HOSPADM

## 2020-07-08 RX ORDER — ONDANSETRON 4 MG/1
4 TABLET, ORALLY DISINTEGRATING ORAL EVERY 8 HOURS PRN
Qty: 12 TABLET | Refills: 0 | Status: SHIPPED | OUTPATIENT
Start: 2020-07-08 | End: 2020-08-25

## 2020-07-08 RX ADMIN — OXYCODONE HYDROCHLORIDE 5 MG: 5 TABLET ORAL at 22:05

## 2020-07-08 RX ADMIN — PROPOFOL 300 MG: 10 INJECTION, EMULSION INTRAVENOUS at 14:19

## 2020-07-08 RX ADMIN — PHENYLEPHRINE HYDROCHLORIDE 100 MCG: 10 INJECTION INTRAVENOUS at 17:46

## 2020-07-08 RX ADMIN — PHENYLEPHRINE HYDROCHLORIDE 100 MCG: 10 INJECTION INTRAVENOUS at 16:31

## 2020-07-08 RX ADMIN — FENTANYL CITRATE 50 MCG: 50 INJECTION INTRAMUSCULAR; INTRAVENOUS at 19:23

## 2020-07-08 RX ADMIN — SODIUM CHLORIDE, POTASSIUM CHLORIDE, SODIUM LACTATE AND CALCIUM CHLORIDE: 600; 310; 30; 20 INJECTION, SOLUTION INTRAVENOUS at 16:20

## 2020-07-08 RX ADMIN — PROCHLORPERAZINE EDISYLATE 10 MG: 5 INJECTION INTRAMUSCULAR; INTRAVENOUS at 20:00

## 2020-07-08 RX ADMIN — ESMOLOL HYDROCHLORIDE 100 MG: 10 INJECTION, SOLUTION INTRAVENOUS at 14:21

## 2020-07-08 RX ADMIN — CLINDAMYCIN PHOSPHATE 900 MG: 900 INJECTION, SOLUTION INTRAVENOUS at 14:25

## 2020-07-08 RX ADMIN — DEXAMETHASONE SODIUM PHOSPHATE 6 MG: 4 INJECTION, SOLUTION INTRAMUSCULAR; INTRAVENOUS at 15:41

## 2020-07-08 RX ADMIN — HYDROMORPHONE HYDROCHLORIDE 0.4 MG: 1 INJECTION, SOLUTION INTRAMUSCULAR; INTRAVENOUS; SUBCUTANEOUS at 20:01

## 2020-07-08 RX ADMIN — SUGAMMADEX 200 MG: 100 INJECTION, SOLUTION INTRAVENOUS at 18:35

## 2020-07-08 RX ADMIN — PHENYLEPHRINE HYDROCHLORIDE 100 MCG: 10 INJECTION INTRAVENOUS at 18:07

## 2020-07-08 RX ADMIN — HYDROMORPHONE HYDROCHLORIDE 0.3 MG: 1 INJECTION, SOLUTION INTRAMUSCULAR; INTRAVENOUS; SUBCUTANEOUS at 19:36

## 2020-07-08 RX ADMIN — HYDROMORPHONE HYDROCHLORIDE 0.25 MG: 1 INJECTION, SOLUTION INTRAMUSCULAR; INTRAVENOUS; SUBCUTANEOUS at 18:52

## 2020-07-08 RX ADMIN — PHENYLEPHRINE HYDROCHLORIDE 100 MCG: 10 INJECTION INTRAVENOUS at 16:12

## 2020-07-08 RX ADMIN — PHENYLEPHRINE HYDROCHLORIDE 100 MCG: 10 INJECTION INTRAVENOUS at 18:16

## 2020-07-08 RX ADMIN — FENTANYL CITRATE 50 MCG: 50 INJECTION, SOLUTION INTRAMUSCULAR; INTRAVENOUS at 18:55

## 2020-07-08 RX ADMIN — HYDROMORPHONE HYDROCHLORIDE 0.25 MG: 1 INJECTION, SOLUTION INTRAMUSCULAR; INTRAVENOUS; SUBCUTANEOUS at 18:00

## 2020-07-08 RX ADMIN — FENTANYL CITRATE 50 MCG: 50 INJECTION, SOLUTION INTRAMUSCULAR; INTRAVENOUS at 14:45

## 2020-07-08 RX ADMIN — ONDANSETRON 4 MG: 2 INJECTION INTRAMUSCULAR; INTRAVENOUS at 18:23

## 2020-07-08 RX ADMIN — Medication: at 18:44

## 2020-07-08 RX ADMIN — FENTANYL CITRATE 50 MCG: 50 INJECTION, SOLUTION INTRAMUSCULAR; INTRAVENOUS at 14:54

## 2020-07-08 RX ADMIN — PHENYLEPHRINE HYDROCHLORIDE 100 MCG: 10 INJECTION INTRAVENOUS at 14:31

## 2020-07-08 RX ADMIN — PHENYLEPHRINE HYDROCHLORIDE 100 MCG: 10 INJECTION INTRAVENOUS at 15:48

## 2020-07-08 RX ADMIN — ROCURONIUM BROMIDE 100 MG: 10 INJECTION INTRAVENOUS at 14:19

## 2020-07-08 RX ADMIN — MIDAZOLAM 2 MG: 1 INJECTION INTRAMUSCULAR; INTRAVENOUS at 14:11

## 2020-07-08 RX ADMIN — PHENYLEPHRINE HYDROCHLORIDE 100 MCG: 10 INJECTION INTRAVENOUS at 15:45

## 2020-07-08 RX ADMIN — PHENYLEPHRINE HYDROCHLORIDE 0.5 MCG/KG/MIN: 10 INJECTION INTRAVENOUS at 15:45

## 2020-07-08 RX ADMIN — LIDOCAINE HYDROCHLORIDE 60 MG: 20 INJECTION, SOLUTION INFILTRATION; PERINEURAL at 14:19

## 2020-07-08 RX ADMIN — FENTANYL CITRATE 25 MCG: 50 INJECTION INTRAMUSCULAR; INTRAVENOUS at 19:16

## 2020-07-08 RX ADMIN — SODIUM CHLORIDE, POTASSIUM CHLORIDE, SODIUM LACTATE AND CALCIUM CHLORIDE: 600; 310; 30; 20 INJECTION, SOLUTION INTRAVENOUS at 14:11

## 2020-07-08 RX ADMIN — PHENYLEPHRINE HYDROCHLORIDE 100 MCG: 10 INJECTION INTRAVENOUS at 17:43

## 2020-07-08 RX ADMIN — PROPOFOL 30 MG: 10 INJECTION, EMULSION INTRAVENOUS at 14:54

## 2020-07-08 RX ADMIN — FENTANYL CITRATE 50 MCG: 50 INJECTION, SOLUTION INTRAMUSCULAR; INTRAVENOUS at 19:00

## 2020-07-08 RX ADMIN — PHENYLEPHRINE HYDROCHLORIDE 100 MCG: 10 INJECTION INTRAVENOUS at 18:24

## 2020-07-08 RX ADMIN — ONDANSETRON 4 MG: 2 INJECTION INTRAMUSCULAR; INTRAVENOUS at 19:00

## 2020-07-08 ASSESSMENT — MIFFLIN-ST. JEOR: SCORE: 1624.75

## 2020-07-08 NOTE — ANESTHESIA PREPROCEDURE EVALUATION
"Anesthesia Pre-Procedure Evaluation    Patient: Roseanna Dorsey   MRN:     [id number] Gender:   adult   Age:    40 year old :      1980        Preoperative Diagnosis: Gender dysphoria in adolescent and adult [F64.0]   Procedure(s):  bilateral simple mastectomy,  nipple grafts. OnQ     LABS:  CBC:   Lab Results   Component Value Date    WBC 9.6 2020    WBC 7.1 2019    HGB 13.4 2020    HGB 12.9 2019    HCT 40.5 2020    HCT 39.0 2019     2020     2019     BMP:   Lab Results   Component Value Date     2019     2019    POTASSIUM 4.1 2019    POTASSIUM 4.7 2019    CHLORIDE 106 2019    CHLORIDE 102 2019    CO2 28 2019    CO2 22 2019    BUN 15 2019    BUN 19 2019    CR 0.77 2019    CR 0.92 2019    GLC 92 2019     (H) 2019     COAGS:   Lab Results   Component Value Date    PTT 30 2011    INR 1.01 2011    FIBR 630 (H) 2011     POC:   Lab Results   Component Value Date    HCG Negative 2019     OTHER:   Lab Results   Component Value Date    A1C 5.3 2019    KERRY 8.9 2019    ALBUMIN 3.8 2019    PROTTOTAL 7.3 2019    ALT 30 2019    AST 18 2019    ALKPHOS 53 2019    BILITOTAL 0.6 2019    TSH 1.66 2019    SED 13 2008        Preop Vitals    BP Readings from Last 3 Encounters:   20 128/80   20 112/72   19 120/70    Pulse Readings from Last 3 Encounters:   20 84   20 72   19 72      Resp Readings from Last 3 Encounters:   19 16   18 16   10/15/14 16    SpO2 Readings from Last 3 Encounters:   19 100%   18 96%   13 98%      Temp Readings from Last 1 Encounters:   19 36.2  C (97.1  F) (Temporal)    Ht Readings from Last 1 Encounters:   20 1.676 m (5' 6\")      Wt Readings from Last 1 Encounters: " "  20 93.7 kg (206 lb 9.6 oz)    Estimated body mass index is 33.35 kg/m  as calculated from the following:    Height as of 20: 1.676 m (5' 6\").    Weight as of 20: 93.7 kg (206 lb 9.6 oz).     LDA:        Past Medical History:   Diagnosis Date     Anemia     PPH with last pregnancy     Gender dysphoria in adult 2019     Presence of of 52 mg levonorgestrel-releasing intrauterine device (IUD) 2019    placed by Roseann to attempt amenorrhea until hyst can be done      Past Surgical History:   Procedure Laterality Date     CHOLECYSTECTOMY  age 21     DILATION AND CURETTAGE SUCTION, TREAT INCOMPLETE   10/16/2012    Procedure: DILATION AND CURETTAGE SUCTION, TREAT INCOMPLETE ;  suction dilation and curettage;  Surgeon: Wilda Whitfield MD;  Location:  GI      Allergies   Allergen Reactions     Penicillins      Sulfa Drugs         Anesthesia Evaluation     .             ROS/MED HX    ENT/Pulmonary:  - neg pulmonary ROS     Neurologic:  - neg neurologic ROS     Cardiovascular:  - neg cardiovascular ROS       METS/Exercise Tolerance:     Hematologic:  - neg hematologic  ROS       Musculoskeletal:  - neg musculoskeletal ROS       GI/Hepatic:  - neg GI/hepatic ROS       Renal/Genitourinary:  - ROS Renal section negative       Endo:  - neg endo ROS       Psychiatric: Comment: Gender dysphoria        Infectious Disease:  - neg infectious disease ROS       Malignancy:      - no malignancy   Other:                         PHYSICAL EXAM:   Mental Status/Neuro: A/A/O   Airway: Facies: Feasible  Mallampati: I  Mouth/Opening: Full  TM distance: > 6 cm  Neck ROM: Full   Respiratory: Auscultation: CTAB     Resp. Rate: Normal     Resp. Effort: Normal      CV: Rhythm: Regular  Rate: Age appropriate  Heart: Normal Sounds  Edema: None   Comments:      Dental: Normal Dentition                Assessment:   ASA SCORE: 1    H&P: History and physical reviewed and following examination; no interval " change.   Smoking Status:  Non-Smoker/Unknown   NPO Status: NPO Appropriate     Plan:   Anes. Type:  General   Pre-Medication: None   Induction:  IV (Standard)   Airway: ETT; Oral   Access/Monitoring: PIV; 2nd PIV   Maintenance: Balanced     Postop Plan:   Postop Pain: Opioids  Postop Sedation/Airway: Not planned  Disposition: Inpatient/Admit     PONV Management:   Adult Risk Factors: Female, Non-Smoker, Postop Opioids   Prevention: Ondansetron, Dexamethasone     CONSENT: Direct conversation   Plan and risks discussed with: Patient   Blood Products: Consent Deferred (Minimal Blood Loss)                   Dhaval Christie DO

## 2020-07-08 NOTE — BRIEF OP NOTE
[facility] Brief Operative Note    Pre-operative diagnosis: Gender dysphoria in adolescent and adult [F64.0]   Post-operative diagnosis Same as above   Procedure: Procedure(s):  bilateral simple mastectomy,  nipple grafts. OnQ   Surgeon(s): Surgeon(s) and Role:     * Holli Santacruz MD - Primary   Estimated blood loss: 200ml   Specimens: ID Type Source Tests Collected by Time Destination   A : Right Breast Tissue Breast, Right SURGICAL PATHOLOGY EXAM Holli Santacruz MD 7/8/2020  3:16 PM    B : Left Breast Tissue Breast, Left SURGICAL PATHOLOGY EXAM Holli Santacruz MD 7/8/2020  3:17 PM       Findings: Bilateral simple mastectomies with free nipple grafts. Grafts secured with bolster dressings. Jpx2, on Q pump. Wrapped with kerlix and ace wrap. Syed to remain in pacu until urine output increases.

## 2020-07-09 VITALS
HEIGHT: 66 IN | SYSTOLIC BLOOD PRESSURE: 113 MMHG | DIASTOLIC BLOOD PRESSURE: 53 MMHG | BODY MASS INDEX: 32.58 KG/M2 | OXYGEN SATURATION: 97 % | RESPIRATION RATE: 18 BRPM | TEMPERATURE: 96.8 F | HEART RATE: 76 BPM | WEIGHT: 202.7 LBS

## 2020-07-09 PROCEDURE — 25000132 ZZH RX MED GY IP 250 OP 250 PS 637: Performed by: STUDENT IN AN ORGANIZED HEALTH CARE EDUCATION/TRAINING PROGRAM

## 2020-07-09 RX ORDER — OXYCODONE HYDROCHLORIDE 5 MG/1
5-10 TABLET ORAL EVERY 6 HOURS PRN
Qty: 20 TABLET | Refills: 0 | Status: SHIPPED | OUTPATIENT
Start: 2020-07-09 | End: 2020-08-25

## 2020-07-09 RX ORDER — ONDANSETRON 2 MG/ML
4 INJECTION INTRAMUSCULAR; INTRAVENOUS EVERY 6 HOURS PRN
Status: DISCONTINUED | OUTPATIENT
Start: 2020-07-09 | End: 2020-07-09 | Stop reason: HOSPADM

## 2020-07-09 RX ORDER — LIDOCAINE 40 MG/G
CREAM TOPICAL
Status: DISCONTINUED | OUTPATIENT
Start: 2020-07-09 | End: 2020-07-09 | Stop reason: HOSPADM

## 2020-07-09 RX ORDER — ONDANSETRON 4 MG/1
4 TABLET, ORALLY DISINTEGRATING ORAL EVERY 6 HOURS PRN
Status: DISCONTINUED | OUTPATIENT
Start: 2020-07-09 | End: 2020-07-09 | Stop reason: HOSPADM

## 2020-07-09 RX ORDER — IBUPROFEN 600 MG/1
600 TABLET, FILM COATED ORAL EVERY 6 HOURS PRN
Status: DISCONTINUED | OUTPATIENT
Start: 2020-07-09 | End: 2020-07-09 | Stop reason: HOSPADM

## 2020-07-09 RX ORDER — HYDROXYZINE HYDROCHLORIDE 25 MG/1
25 TABLET, FILM COATED ORAL EVERY 6 HOURS PRN
Status: DISCONTINUED | OUTPATIENT
Start: 2020-07-09 | End: 2020-07-09 | Stop reason: HOSPADM

## 2020-07-09 RX ORDER — ACETAMINOPHEN 325 MG/1
650 TABLET ORAL EVERY 6 HOURS PRN
Status: DISCONTINUED | OUTPATIENT
Start: 2020-07-09 | End: 2020-07-09 | Stop reason: HOSPADM

## 2020-07-09 RX ORDER — NALOXONE HYDROCHLORIDE 0.4 MG/ML
.1-.4 INJECTION, SOLUTION INTRAMUSCULAR; INTRAVENOUS; SUBCUTANEOUS
Status: DISCONTINUED | OUTPATIENT
Start: 2020-07-09 | End: 2020-07-09 | Stop reason: HOSPADM

## 2020-07-09 RX ORDER — OXYCODONE HYDROCHLORIDE 5 MG/1
5-10 TABLET ORAL
Status: DISCONTINUED | OUTPATIENT
Start: 2020-07-09 | End: 2020-07-09 | Stop reason: HOSPADM

## 2020-07-09 RX ORDER — IBUPROFEN 600 MG/1
600 TABLET, FILM COATED ORAL EVERY 6 HOURS PRN
Qty: 50 TABLET | Refills: 0 | Status: SHIPPED | OUTPATIENT
Start: 2020-07-09 | End: 2020-08-25

## 2020-07-09 RX ORDER — ACETAMINOPHEN 325 MG/1
650 TABLET ORAL EVERY 6 HOURS PRN
Qty: 100 TABLET | Refills: 0 | Status: SHIPPED | OUTPATIENT
Start: 2020-07-09 | End: 2022-07-07

## 2020-07-09 RX ORDER — HYDROXYZINE HYDROCHLORIDE 25 MG/1
25 TABLET, FILM COATED ORAL EVERY 6 HOURS PRN
Qty: 15 TABLET | Refills: 0 | Status: SHIPPED | OUTPATIENT
Start: 2020-07-09 | End: 2020-08-25

## 2020-07-09 RX ADMIN — ACETAMINOPHEN 650 MG: 325 TABLET, FILM COATED ORAL at 11:23

## 2020-07-09 RX ADMIN — OXYCODONE HYDROCHLORIDE 5 MG: 5 TABLET ORAL at 01:57

## 2020-07-09 ASSESSMENT — MIFFLIN-ST. JEOR: SCORE: 1616.19

## 2020-07-09 ASSESSMENT — ACTIVITIES OF DAILY LIVING (ADL)
SWALLOWING: 0-->SWALLOWS FOODS/LIQUIDS WITHOUT DIFFICULTY
BATHING: 0-->INDEPENDENT
RETIRED_COMMUNICATION: 0-->UNDERSTANDS/COMMUNICATES WITHOUT DIFFICULTY
FALL_HISTORY_WITHIN_LAST_SIX_MONTHS: NO
AMBULATION: 0-->INDEPENDENT
COGNITION: 0 - NO COGNITION ISSUES REPORTED
TOILETING: 0-->INDEPENDENT
RETIRED_EATING: 0-->INDEPENDENT
DRESS: 0-->INDEPENDENT
TRANSFERRING: 0-->INDEPENDENT

## 2020-07-09 NOTE — PLAN OF CARE
"Vitals:    07/09/20 0059 07/09/20 0136 07/09/20 0332 07/09/20 0800   BP: 112/69 117/75 124/60 113/53   BP Location:  Right arm Right arm Right arm   Pulse: 55 77  76   Resp: 14 16 16 18   Temp: 97.7  F (36.5  C) 96.8  F (36  C) 96.7  F (35.9  C) 96.8  F (36  C)   TempSrc: Oral Oral Oral Oral   SpO2: 99% 99% 96% 97%   Weight:  91.9 kg (202 lb 11.2 oz)     Height:  1.676 m (5' 6\")      Afebrile vital stable, sating 97% on  room air, lungs clear, +BS, chest dressing intact with ace wrap,   Bilateral RIOS to bulb suction, with minimal out put, discharge script written, teaching has been done,  Follow up appointment is set, all additional supply provided, tolerating intake, pt is waiting on ride to be discharge home.  "

## 2020-07-09 NOTE — DISCHARGE INSTRUCTIONS
Caring for Your [place] Drain    You have been discharged with a [place] drainage tube. This tube drains fluid from your incision, helping prevent swelling and reducing the risk for infection. The tube is held in place by a few stitches. The drain will be removed when your doctor determines you no longer need it and when the amount of drainage decreases. The color and amount of fluid varies. Right after surgery, the fluid may be bright red and may become clearer over time.   Dressing:    Keep the skin around the tube dry.    If you have a dressing, change it every day.   o Wash your hands.  o Remove the old bandage. Do not use scissors-you may accidentally cut the tube.  o Check for any redness, swelling, drainage, or broken stitches. (Call your doctor with any of these findings).  o Wash your hands again.  o Wet a cotton swab (Q-tip) and clean around the incision and the tube site. Use normal saline solution (salt and water) or soap and water. Start at the tube site and move outward in a circular motion.   o Pat dry.  o Put a new bandage on the incision and tube site. Make the bandage large enough to cover the whole incision area.   o Tape the bandage in place.  o Throw out old materials and wash your hands.   Home Care:    Tape the tube to the skin below the bandage. Make sure to keep some slack in the tube to keep it from pulling out.     DO NOT sleep on the same side as the tube.    Secure the tube and bulb inside your clothing with a safety pin. This helps keep the tube from being pulled out.     Keep the bulb compressed at all times, except when you empty it.    Empty your drain at least twice a day. Empty it more often if the drain is full.   o Lift the opening of the drain.  o Drain the fluid into a measuring cup.  o Record the amount of fluid each time you empty. Share the information with your doctor at your follow-up visit.   o Squeeze the bulb with your hands until you hear air coming out of  the bulb.  o Close the opening.     Tape plastic wrap over the bandage and tube site when you shower.      Stripping  the tube helps keep blood clots from blocking the tube.                         ONLY DO THIS IF YOUR MD INSTRUCTED YOU TO DO SO!  o Hold the tubing where it leaves the skin with one hand. This keeps it from pulling on the skin.  o Pinch the tubing with the thumb and first finger of your other hand.   o Slowly and firmly pull your thumb and first finger down the tube (squeezing the tube between your fingers). Keep squeezing the tube as you run your fingers towards the bulb. If the pulling hurts or feels like it is coming out of the skin, STOP. Begin again more gently.  o Let go of the tubing with both hands. If the tube is still blocked, repeat these steps three or four times. Make sure that the bulb is compressed so it creates suction.  When to call your doctor:    New or increased pain around the tube    Redness, warmth, or swelling around the incision or tube    Drainage that is foul smelling    Vomiting    Fever over 101 F degrees    Fluid leaking around the tube    Incision seems not to be healing    Stitches become loose    The tube falls out    Drainage that changes from light pick to dark red    A sudden increase or decrease in the amount of drainage (over 30 ml).  Your drainage record:  Date Time Bulb 1: Amount of drainage (ml or cc) Bulb 2: Amount of drainage (ml or cc) Notes                                                                                  ON-Q  C-bloc Continuous Nerve Block Discharge Instructions  The Nerve Block:      Your anesthesiologist performed a nerve block (a procedure that blocks pain to only a specific area) by inserting a small tube in your body.  This tube is connected to a pump that will help control your pain.    The pump is shaped like a balloon and is filled with medicine that causes numbness or loss of sensation to help control your pain around the incision  or site of injury.  The pain pump DOES NOT contain controlled substances.      The medicine in the pump may alter your ability to feel changes in temperature or pressure. Your doctor will tell you if any special precautions apply to you.       The Pump      DO NOT SQUEEZE THE PUMP.     The pump delivers medicine at a very slow rate.    You will NOT see the medicine moving through the tubing.    As the medicine is delivered, the pump ball will slowly become smaller.    It may take a day or so before you notice a change in the size and look of the pump.    Depending on the size of your pump, it may take 2 - 5 days to give all the medicine.    The middle part of the pump may look like an apple core when empty.  Managing Your Pain  The Medicine and Infusion Rate:   0.2% Ropivacaine 4mL / hr            The continuous nerve block infusion may not block all of the pain from your surgery so it is important that you take the pain medicines prescribed by your surgeon if you need them.      If you continue to have difficulty with your pain control, please page or call the anesthesiologist.  Caring for Your Pump at Home    Wear the pump on the outside of clothing - away from your skin.  The delivery rate is accurate only at room temperature.    Make sure the white clamp on the tubing remains open (moves freely on the tubing).    Make sure there are no kinks in the tubing.    Do not tape or cover up the filter.    Protect the pump from sunlight and heat.    When sleeping:   o Do not place the pump underneath the bed covers where the pump may become too warm.  o Do not place the pump on the floor or hang the pump on a bed post as these situations may cause the tubing to get tangled and get pulled out.    Bathing/Showering:    o We recommend taking sponge baths until the pump is removed.  o It is important to not get the area where the tube enters your body wet.    It is normal for a small amount of fluid to leak from the hole in  your body where the tube is inserted.  If this occurs, reinforce the bandage with another bandage.  The Infusion    Do not turn the infusion off unless your anesthesiologist has told you to do so.    Do not change the flow rate of the infusion unless your anesthesiologist told you to do so.  Changing the flow rate without your doctor s instruction may result in the wrong dose of medicine, which could cause serious injury.    If your anesthesiologist told you to change the rate of your infusion:  o Flip open the clear cover.  o Turn the white key on the select-a-flow dial to the instructed rate.  (The rate of the infusion should line up with the black arrow at the top of the controller.)    o Listen for the click when you move the dial.  Removing the Tubing    When the pump is empty or if you have been told to stop the infusion you can remove the tubing.  Follow these steps:  o Wash your hands.  o Clamp the tubing (squeeze the white clamp until you hear or feel a click).  o Remove the clear dressing that covers the tubing.  o Grasp the tubing close to the skin and gently pull.  If you meet resistance, stop pulling and page or call your anesthesiologist.  o DO NOT cut or forcefully remove the tubing.  o After removal, check the end of the tubing for a dark tip.  If you do not see a dark tip, page or call your anesthesiologist.  o Apply firm pressure over the site until oozing stops.  Wash the area with soap and water, dry with a clean towel and then cover with a bandage.      The pump is not reusable. Dispose of it in the trash and wash your hands.   Troubleshooting    Tubing Comes Out From Skin:  If the tube accidentally comes out, check the end of the tube for a dark tip.    If you see a dark tip simply discard it and use the pain pills prescribed to you by your surgeon.    If you don t see a dark tip, page or call the anesthesiologist.    Tubing Disconnection:  If the tubing accidentally becomes disconnected from  the pump, DO NOT reconnect the pump to the tubing.  It may have been contaminated with germs.  Close the tubing clamp and immediately page or call your anesthesiologist.    Fluid Leaking:  If enough fluid is leaking from the pump or the tubing outside your body to soak through the dressing, close the white clamp on the tubing and page or call your anesthesiologist.    Immediately report the following to your anesthesiologist:    Redness, warmth, swelling, or tenderness at the site the tubing was inserted    Increase in pain    Fever, chills, sweats    Bowel or bladder changes    Difficulty breathing    Dizziness, lightheadedness    Blurred vision    Ringing or buzzing in your ears    Metal taste in your mouth    Numbness and/or tingling around your mouth, fingers or toes    Drowsiness    Confusion    Trouble removing the tubing    Dark tip is not present when tubing is removed         Notifying your Anesthesiologist  o Page:  Dial [phone], then enter [phone].  You will be prompted to enter your phone number and then the # sign.  The anesthesiologist will call you back.  o Call:  Dial [phone].  Ask to speak to the anesthesiologist on call for the [facility].       Same-Day Surgery   Adult Discharge Orders & Instructions     For 24 hours after surgery:  1. Get plenty of rest.  A responsible adult must stay with you for at least 24 hours after you leave the hospital.   2. Pain medication can slow your reflexes. Do not drive or use heavy equipment.  If you have weakness or tingling, don't drive or use heavy equipment until this feeling goes away.  3. Mixing alcohol and pain medication can cause dizziness and slow your breathing. It can even be fatal. Do not drink alcohol while taking pain medication.  4. Avoid strenuous or risky activities.  Ask for help when climbing stairs.   5. You may feel lightheaded.  If so, sit for a few minutes before standing.  Have someone help you get up.   6. If  you have nausea (feel sick to your stomach), drink only clear liquids such as apple juice, ginger ale, broth or 7-Up.  Rest may also help.  Be sure to drink enough fluids.  Move to a regular diet as you feel able. Take pain medications with a small amount of solid food, such as toast or crackers, to avoid nausea.   7. A slight fever is normal. Call the doctor if your fever is over 100 F (37.7 C) (taken under the tongue) or lasts longer than 24 hours.  8. You may have a dry mouth, muscle aches, trouble sleeping or a sore throat.  These symptoms should go away after 24 hours.  9. Do not make important or legal decisions.   Pain Management:      1. Take pain medication (if prescribed) for pain as directed by your physician.        2. WARNING: If the pain medication you have been prescribed contains Tylenol  (acetaminophen), DO NOT take additional doses of Tylenol (acetaminophen).     Call your doctor for any of the followin.  Signs of infection (fever, growing tenderness at the surgery site, severe pain, a large amount of drainage or bleeding, foul-smelling drainage, redness, swelling).    2.  It has been over 8 to 10 hours since surgery and you are still not able to urinate (pee).    3.  Headache for over 24 hours.    4.  Numbness, tingling or weakness the day after surgery (if you had spinal anesthesia).  To contact a doctor, call _____________________________________ or:      [phone] and ask for the Resident On Call for:          __________________________________________ (answered 24 hours a day)      Emergency Department:  [facility]: [phone]  [facility]: [phone]               Rev. 10/2014

## 2020-07-09 NOTE — ANESTHESIA CARE TRANSFER NOTE
Patient: Roseanna Dorsey    Procedure(s):  bilateral simple mastectomy,  nipple grafts. OnQ    Diagnosis: Gender dysphoria in adolescent and adult [F64.0]  Diagnosis Additional Information: No value filed.    Anesthesia Type:   General     Note:  Airway :Face Mask  Patient transferred to:PACU  Handoff Report: Identifed the Patient, Identified the Reponsible Provider, Reviewed the pertinent medical history, Discussed the surgical course, Reviewed Intra-OP anesthesia mangement and issues during anesthesia, Set expectations for post-procedure period and Allowed opportunity for questions and acknowledgement of understanding      Vitals: (Last set prior to Anesthesia Care Transfer)    CRNA VITALS  7/8/2020 1814 - 7/8/2020 1904      7/8/2020             Pulse:  81    SpO2:  100 %                Electronically Signed By: ARTUR Juan CRNA  July 8, 2020  7:04 PM

## 2020-07-09 NOTE — OR NURSING
PACU to Inpatient Nursing Handoff    Patient Roseanna Dorsey is a 40 year old adult who speaks English.   Procedure Procedure(s):  bilateral simple mastectomy,  nipple grafts. OnQ   Surgeon(s) Primary: Holli Santacruz MD  Assisting: Mago Solitario PA-C     Allergies   Allergen Reactions     Penicillins      Sulfa Drugs        Isolation  [unfilled]     Past Medical History   has a past medical history of Anemia, Gender dysphoria in adult (12/16/2019), and Presence of of 52 mg levonorgestrel-releasing intrauterine device (IUD) (12/27/2019).    Anesthesia General   Dermatome Level     Preop Meds Not applicable   Nerve block Not applicable   Intraop Meds dexamethasone (Decadron)  fentanyl (Sublimaze): 100 mcg total  hydromorphone (Dilaudid): 0.25 mg total  ondansetron (Zofran): last given at 1823  OnQ placed in OR (see MAR)   Local Meds On Q placed in OR (See MAR)   Antibiotics clindamycin (Cleocin) - last given at 1425     Pain Patient Currently in Pain: yes  Comfort: tolerable with discomfort  Pain Control: partially effective   PACU meds  fentanyl (Sublimaze): 75 mcg (total dose) last given at 1923   hydromorphone (Dilaudid): 0.7 mg (total dose) last given at 2001   ondansetron (Zofran): 4 mg (total dose) last given at 1900   oxycodone (Roxicodone): 5 mg (total dose) last given at 2205   prochlorperazine (Compazine): 10 mg (total dose) last given at 2000    PCA / epidural On Q (see MAR)   Capnography     Telemetry ECG Rhythm: Normal sinus rhythm   Inpatient Telemetry Monitor Ordered? No        Labs Glucose Lab Results   Component Value Date    GLC 86 07/08/2020       Hgb Lab Results   Component Value Date    HGB 14.4 07/08/2020       INR Lab Results   Component Value Date    INR 1.01 03/20/2011      PACU Imaging Not applicable     Wound/Incision Incision/Surgical Site 07/08/20 Bilateral Breast (Active)   Incision Assessment UTV 07/08/20 2200   Alejandra-Incision Assessment UTV 07/08/20 2200   Closure  ROSE MARY 07/08/20 2200   Incision Drainage Amount UTV 07/08/20 2200   Dressing Intervention Clean, dry, intact 07/08/20 2200   Number of days: 0      CMS        Equipment RIOS x2, On Q ball   Other LDA       IV Access Peripheral IV 07/08/20 Left Hand (Active)   Site Assessment [facility] 07/08/20 2200   Line Status Saline locked 07/08/20 2200   Phlebitis Scale 0-->no symptoms 07/08/20 2200   Infiltration Scale 0 07/08/20 2200   Infiltration Site Treatment Method  None 07/08/20 1947   Extravasation? No 07/08/20 1947   Number of days: 0       Peripheral IV 07/08/20 Right Lower forearm (Active)   Site Assessment [facility] 07/08/20 2200   Line Status Saline locked 07/08/20 2200   Phlebitis Scale 0-->no symptoms 07/08/20 2200   Infiltration Scale 0 07/08/20 2200   Infiltration Site Treatment Method  None 07/08/20 1947   Extravasation? No 07/08/20 1947   Number of days: 0       Subcutaneous Catheter (Active)   Site Assessment [facility] 07/08/20 2200   Line status: Medial or Superior Lumen Infusing 07/08/20 1847   Line status: Lateral or Inferior Lumen Infusing 07/08/20 1847   Line Status Infusing 07/08/20 2200   Dressing Intervention Transparent 07/08/20 1847   Number of days: 0      Blood Products Not applicable  mL   Intake/Output Date 07/08/20 0700 - 07/09/20 0659   Shift 4930-0107 7201-6230 6147-6425 24 Hour Total   INTAKE   P.O.  10  10   I.V.  1500  1500   Shift Total(mL/kg)  1510(16.27)  1510(16.27)   OUTPUT   Urine  350  350   Drains  30  30   Shift Total(mL/kg)  380(4.09)  380(4.09)   Weight (kg) 92.8 92.8 92.8 92.8      Drains / Syed Closed/Suction Drain 1 Right Chest Bulb 15 [place] (Active)   Site Description [facility] 07/08/20 2200   Dressing Status Normal: Clean, Dry & Intact 07/08/20 2200   Drainage Appearance Bloody/Bright Red 07/08/20 2200   Status To bulb suction 07/08/20 2200   Output (ml) 15 ml 07/08/20 2100   Number of days: 0       Closed/Suction Drain 2 Left Chest Bulb 15 [place] (Active)   Site Description [facility] 07/08/20 1437    Dressing Status Normal: Clean, Dry & Intact 07/08/20 2200   Drainage Appearance Bloody/Bright Red 07/08/20 2200   Status To bulb suction 07/08/20 2200   Output (ml) 15 ml 07/08/20 2100   Number of days: 0      Time of void PreOp Void Prior to Procedure: 1330 (07/08/20 1333)    PostOp Urine Occurrence: 1 (07/08/20 1330)    Diapered? No   Bladder Scan     PO 10 mL (07/08/20 2250)  tolerating sips     Vitals    B/P: 116/68  T: 97.8  F (36.6  C)    Temp src: Oral  P:  Pulse: 68 (07/08/20 2300)    Heart Rate: 79 (07/08/20 2130)     R: 12  O2:  SpO2: 93 %    O2 Device: Nasal cannula (07/08/20 2300)    Oxygen Delivery: 1 LPM (07/08/20 2300)         Family/support present significant other   Patient belongings     Patient transported on Wheel chair   DC meds/scripts (obs/outpt) Yes, meds, given to S.POPPY Thomasy   Inpatient Pain Meds Released? Yes       Special needs/considerations None   Tasks needing completion None       Bradford Puentes RN  [facility] [phone]

## 2020-07-09 NOTE — DISCHARGE SUMMARY
[facility]  Discharge Summary  Colon and Rectal Surgery     Roseanna Dorsey MRN# [id number]   YOB: 1980 Age: 40 year old     Date of Admission:  7/8/2020  Date of Discharge::  7/9/2020  Admitting Physician:  Holli Santacruz MD  Discharge Physician:  Holli Santacruz MD  Primary Care Physician:        Wilda Whitfield          Admission Diagnoses:   Gender dysphoria in adolescent and adult [F64.0]          Discharge Diagnosis:   Gender dysphoria in adolescent and adult [F64.0]         Procedures:   7/8/20: Bilateral simple mastectomy, nipple grafts. OnQ pump.           Consultations:   None         Imaging Studies:     Results for orders placed or performed in visit on 12/17/19   MA Screen Bilateral w/Luis Eduardo    Narrative    SCREENING MAMMOGRAM, BILATERAL, DIGITAL w/CAD AND TOMOSYNTHESIS -  12/17/2019 8:55 AM    BREAST SYMPTOMS: No current breast complaints.     COMPARISON:  Baseline.    BREAST DENSITY: Scattered fibroglandular densities.    COMMENTS: No findings of suspicion for malignancy.       Impression    IMPRESSION: BI-RADS CATEGORY: 1 -  Negative    RECOMMENDED FOLLOW-UP: Annual Mammography.    Exam results letter mailed to patient.      PATRICIA CLARKE MD              Medications Prior to Admission:     Medications Prior to Admission   Medication Sig Dispense Refill Last Dose     Cholecalciferol (VITAMIN D3) 50 MCG (2000 UT) TABS    Past Week at Unknown time     levonorgestrel (MIRENA) 20 MCG/24HR IUD 1 each (20 mcg) by Intrauterine route once        Testosterone Enanthate (XYOSTED) 50 MG/0.5ML SOAJ Inject 50 mg Subcutaneous once a week 4 pen 2 7/5/2020     Testosterone Enanthate (XYOSTED) 75 MG/0.5ML SOAJ Inject 75 mg Subcutaneous once a week 4 pen 3               Discharge Medications:     Current Discharge Medication List      START taking these medications    Details   acetaminophen (TYLENOL) 325 MG tablet Take 2 tablets (650 mg) by mouth every 6 hours as  needed for mild pain  Qty: 100 tablet, Refills: 0    Associated Diagnoses: Gender dysphoria in adult      azithromycin (ZITHROMAX) 250 MG tablet Take 2 tablets (500 mg) by mouth daily for 1 day, THEN 1 tablet (250 mg) daily for 4 days.  Qty: 6 tablet, Refills: 0    Associated Diagnoses: Gender dysphoria in adult      !! hydrOXYzine (ATARAX) 25 MG tablet Take 1 tablet (25 mg) by mouth every 6 hours as needed for other (adjuvant pain)  Qty: 15 tablet, Refills: 0    Associated Diagnoses: Gender dysphoria in adult      !! hydrOXYzine (ATARAX) 25 MG tablet Take 1 tablet (25 mg) by mouth 3 times daily as needed for itching  Qty: 15 tablet, Refills: 0    Associated Diagnoses: Gender dysphoria in adult      ibuprofen (ADVIL/MOTRIN) 600 MG tablet Take 1 tablet (600 mg) by mouth every 6 hours as needed for other (inflammatory pain)  Qty: 50 tablet, Refills: 0    Associated Diagnoses: Gender dysphoria in adult      ondansetron (ZOFRAN-ODT) 4 MG ODT tab Take 1 tablet (4 mg) by mouth every 8 hours as needed for nausea  Qty: 12 tablet, Refills: 0    Associated Diagnoses: Gender dysphoria in adult      !! oxyCODONE (ROXICODONE) 5 MG tablet Take 1-2 tablets (5-10 mg) by mouth every 6 hours as needed for pain  Qty: 20 tablet, Refills: 0    Associated Diagnoses: Gender dysphoria in adult      !! oxyCODONE IR (ROXICODONE) 10 MG tablet Take 0.5-1 tablets (5-10 mg) by mouth every 6 hours as needed for severe pain  Qty: 20 tablet, Refills: 0    Associated Diagnoses: Gender dysphoria in adult       !! - Potential duplicate medications found. Please discuss with provider.      CONTINUE these medications which have NOT CHANGED    Details   Cholecalciferol (VITAMIN D3) 50 MCG (2000 UT) TABS       levonorgestrel (MIRENA) 20 MCG/24HR IUD 1 each (20 mcg) by Intrauterine route once    Associated Diagnoses: Encounter for insertion of intrauterine contraceptive device      Testosterone Enanthate (XYOSTED) 50 MG/0.5ML SOAJ Inject 50 mg Subcutaneous  "once a week  Qty: 4 pen, Refills: 2    Associated Diagnoses: Gender dysphoria in adolescent and adult      Testosterone Enanthate (XYOSTED) 75 MG/0.5ML SOAJ Inject 75 mg Subcutaneous once a week  Qty: 4 pen, Refills: 3    Associated Diagnoses: Gender dysphoria in adolescent and adult                      Brief History of Illness:   Shakir is a 40 M with gender dysphoria who presented to the hospital for bilateral mastectomy with free nipple grafts. Shakir was placed on overnight observation in a bed due to persistent N/V and dropping sats with narcs, probably due to underdiagnosed MARINA. He was transferred to [place] due to [place] shortage [place] [facility] [place]            Hospital Course:   He underwent the above stated procedure and tolerated it well. His post op course was unremarkable. On the day of discharge, his pain was controlled with the OnQ pump, he was ambulating, voiding independently, and tolerating a regular diet. He was discharged home in stable condition with two RIOS drains in place.   Patient is to follow up with Dr. Santacruz in clinic as scheduled on Tuesday next week.          Day of Discharge Physical Exam:   Blood pressure 113/53, pulse 76, temperature 96.8  F (36  C), temperature source Oral, resp. rate 18, height 1.676 m (5' 6\"), weight 91.9 kg (202 lb 11.2 oz), last menstrual period 05/30/2020, SpO2 97 %, not currently breastfeeding.    Gen: AAOx3, NAD  Pulm: Non-labored breathing. Chest incisions c/d/i. Mastectomy flaps well perfused without erythema, edema, or hematoma. RIOS drains with moderate serosanguinous output.  Abd: Soft, appropriately tender, no guarding  Ext:  Warm and well-perfused         Final Pathology Result:   Pending at time of discharge           Discharge Instructions and Follow-Up:     Discharge Procedure Orders   Reason for your hospital stay   Order Comments: S/p bilateral simple mastectomies with nipple grafts. OnQ pain catheters.  Tolerated under GA.  OK to dc home per anesthesia " "criteria.     Follow Up and recommended labs and tests   Order Comments: Follow up with me, Dr. Santacruz at 7/14 at 10:30 1 week at [address] [place] [address]. to evaluate after surgery.  No follow up labs or test are needed.     Activity   Order Comments: Your activity upon discharge: no heavy lifting > 5 lbs x 3 weeks. AVOID excessive/ extreme use of upper body/ extremities x 3 weeks. OK to do gentle movements for daily cares.  AVOID direct trauma to surgical sites.  OK to sleep on your back or modified side position (may be uncomfortable with incisions and drains on the side). CANNOT sleep on stomach for at least the first 2 weeks.  May want to consider sleeping with pillows to prevent from rolling over.   Some patients prefer to sleep in a recliner to prevent rolling, but elevation is not required.     Order Specific Question Answer Comments   Is discharge order? Yes      Monitor and record   Order Comments: RIOS drain output EVERY morning and EVERY night.  Usually between 30-50 mls per day, but don't be alarmed is more or less. May not be equal between sides. Will probably drop off when pain pump is empty.  Usually fluid looks like cherry Koolaid at first, then Hawaiian punch color, then peach tea over time.  May notice protein \"clots\" that look like \"worms\" in the tubing. Do not be alarmed -this is just precipitated protein from the fluid that is weeping from your raw tissues, much like what you see when you scrape your knee.  We DO want to be notified IF: there is increasing amount of fresh bright red blood that rapidly fills the suction bulb after emptying. OR if blood collecting under the skin and causing significant painful swelling on one side (expanding hematoma).     When to contact your care team   Order Comments: Call Plastic Surgery Clinic during daytime hours ([place] [phone], OR [place]: [phone]), OR the hospital  for nights/ weekends ([phone]) to speak with plastic surgery on-call resident " IF you have any of the following: temperature >102.5, increased bleeding noted in drains or under skin, reactions to meds, reactions to pain pump (tingling around lips or ringing in ears), any problems with drains/pain catheters/or dressings.     Wound care and dressings   Order Comments: Instructions to care for your wound at home: as directed.  Keep nipple graft dressings DRY. NO SHOWERS until after follow up appointment.   OK to rewrap ACE if too tight or too loose.   Do NOT mess with dressings underneath ACE wrap or nipple grafts dressings.     Tubes and drains   Order Comments: You are going home with the following tubes or drains: RIOS.  Follow these instructions  to care for your tube.  STRIP tubing and MEASURE/RECORD output Qam & Qpm.  Please bring output record to follow up appointment.  MAKE SURE NURSES INSTRUCT ON DRAIN CARES BEFORE GOING HOME.     Full Code     Order Specific Question Answer Comments   Code status determined by: Discussion with patient/ legal decision maker      Diet   Order Comments: Follow this diet upon discharge: Advance to a regular diet as tolerated.  Drink plenty of fluids to help prevent constipation.  Get plenty of protein, vitamins and minerals (fruits and veggies)  to help with healing.  Can resume usual preop meds and supplements as tolerated.     Order Specific Question Answer Comments   Is discharge order? Yes             Home Health Care:     Not needed           Discharge Disposition:     Discharged to home      Condition at discharge: Stable      Jj Roper MD  Plastic Surgery    Discussed with Dr. Santacruz.

## 2020-07-09 NOTE — PROGRESS NOTES
"Plastic Surgery Progress Note    NAEO. Pain controlled with OnQ pump. Got up out of bed to bathroom yesterday ok. Has not eaten a regular diet yet. Voiding without issue. On 1L NC. No chest pain, sob, nausea or emesis.     /60 (BP Location: Right arm)   Pulse 77   Temp 96.7  F (35.9  C) (Oral)   Resp 16   Ht 1.676 m (5' 6\")   Wt 91.9 kg (202 lb 11.2 oz)   LMP 05/30/2020 (Approximate)   SpO2 96%   BMI 32.72 kg/m      R drain: 30 ml //15 ml SS  L drain: 30 ml // 15 ml SS    Laying in bed NAD  NLB on 1L NC  RRR  Chest: ace wrap and fluffs in place, dressing dry. Mastectomy flaps well perfused without edema, ecchymosis, or hematoma. Drains with small volume serosang output. Bolster dressings in place. Incisions c/d/i.     A/P: 40 M with gender dysphoria who is s/p bilateral simple mastectomy with free nipple grafts on 7/8. POD1. Doing well post operatively.     Encouraged IS and weaning O2 today. Will try ambulating and eating breakfast this morning. If doing well and meeting criteria, can discharge this afternoon. Please provide drain care instructions prior to discharge. Follow up scheduled with Dr. Santacruz next Tuesday.     - Encourage IS, ambulation  - Wean O2  - Regular diet  - Pain control  - Drain care instructions  - Discharge home today when meets criteria  - Follow up scheduled next Tuesday with Dr. Santacruz    Discussed with Dr. Santacruz.    Jj Roper MD  Plastic Surgery    "

## 2020-07-10 NOTE — OP NOTE
Procedure Date: 07/09/2020      ATTENDING:  Holli Santacruz MD      ASSISTANT:  Mago Solitario PA-C (No resident available.  PRERNA did 50% of case).      PREOPERATIVE DIAGNOSIS:  Gender dysphoria.      POSTOPERATIVE DIAGNOSIS:  Gender dysphoria.      PROCEDURE:  Bilateral simple mastectomies with nipple grafts.  On-Q placement.      ANESTHESIA:  GET.      ESTIMATED BLOOD LOSS:  200 mL.      INTRAVENOUS FLUIDS:  1600 mL.      URINE OUTPUT:  150 mL.      COUNTS:  Correct.      COMPLICATIONS:  None.      DRAINS:  RIOS x 2.      SPECIMENS:  Right breast tissue 1555 grams, left breast tissue 1603 grams.      INDICATIONS:  This is a 40-year-old biological female transitioning to male who has diagnosis of gender dysphoria and met WPATH and insurance criteria for gender-affirming top surgery.  Due to the patient's large breast volume and ptosis, they would require double incision technique for bilateral simple mastectomies.  The patient desired nipple reconstruction with nipple grafts.      DESCRIPTION OF PROCEDURE:  The patient was seen in the preoperative waiting area.  The operative sites were marked, including sternal notch, sternal midline, inframammary folds with extensions laterally for possible dog ears, vertical line through the nipple-areolar complex, transposed line from the mid humerus and transposed line from the underlying IMF fold.  Informed consent was obtained after reviewing the possible risks and complications including but not limited to the following:  Infection, bleeding, hematoma, seroma formation, poor healing, possible spitting sutures or dehiscence or hypertrophic scarring, possible altered sensation of the chest wall, either hypo or hypersensitivity, possible injury to surrounding neurovascular and musculoskeletal structures as well as intrathoracic and intraaxillary structures, possible asymmetries and residual deformities, possible need for further surgery, possible anesthetic risks such as  DVT, PE and cardiopulmonary arrest.  The patient was then brought to the operating room, placed supine on the OR table.  After general anesthesia was administered and the patient was oral endotracheally intubated, his arms were secured to arm boards with Ace wraps at 90 degrees from the table.  A Syed was placed.  The patient already had sequential compression devices on lower extremities prior to induction.  Pillows and padded safety straps were used for the thighs and forelegs.  Merry Hugger was placed on the lower body.  The patient was put into a sitting position, and adjustments were made to gain symmetry.  The chest breast area was then prepped and draped in the usual sterile fashion using ChloraPrep.  We made our incisions along the IMF marking using the PEAK PlasmaBlade on the right side and the Launchpilotslab cautery with scalpel on the left.  We left approximately 2.5 cm of subcutaneous fat along the IMF incision before beveling down to the pectoralis fascia.  The breast mound was elevated off the pectoral fascia up towards the clavicle, medially towards the parasternal border, and laterally past the lateral border of the pectoralis major muscle.  Great care was taken to avoid disrupting the pectoralis fascia.  Once the breast was mobilized, it was pulled inferiorly and marked where it overlapped with the IMF incision.  This ended up being fairly close to our preoperative markings.  The line for amputation was made, and then the nipple areolar complex was harvested sharply with a #10 blade.  These nipple grafts were wrapped in normal saline and kept on the backtable, marked per side.  Breast mounds were then amputated in a similar fashion using cautery for the most part.  Great care was taken to not undermine the superior skin flaps at this time.  Any breast tissue removed was weighed off the backtable and ultimately sent to Pathology for histologic examination.  Once the breast mounds were amputated, the  breast superior skin flaps were thinned.  This gave a nice, flat, squared-off, symmetrical contoured onto the chest.  This patient had some slight tapering inferiorly at the bottom of the pectoralis muscle, but otherwise had decent anatomical contour.  Our incisions were temporarily skin stapled and the patient put into a sitting position again.  This allowed us to billy for eradication of lateral dog ears or flattening of the lateral thoracic tissue fullness.  We also joined our incisions in the midline since his breast started out so close.  We bevelled our medial line with a slight upward curve to delineate the difference between the two sides.  We then trimmed off all the excess tissues that we had marked, to gain better symmetry and better anatomical aesthetic.  When we were happy with this, our weights were within 50 grams of each other.  The dissection plane was then irrigated with Ancef solution.  Hemostasis was achieved with cautery.  A #15 round RIOS drain was introduced through a separate stab wound incision laterally and secured with 3-0 nylon suture.  Dual 10-inch On-Q catheters were percutaneously introduced from the epigastric region and draped along the superior aspect of the dissection pocket.  These were secured with benzoin and a Tegaderm.  Definitive closure was then achieved with 3-0 Vicryl deep dermal buried sutures followed by 4-0 Vicryl running subcuticular suture.  There was minimal dog-ear, although there was still lateral fullness, particularly in the sitting position.  We did try to debulk that area and changed the direction of pull by extending our incision more superiorly.  Once we were happy with our incisions and our contour, the area was prepared for nipple grafting.  A 1.5 to 2 cm diameter template was created, and with the patient in the sitting position, we localized the most aesthetic-appearing site for nipple grafts.  This was approximately 2.5 cm above the IMF incision and  approximately 11-12 cm from the midline.  Because this patient had a very wide chest, this did not appear to be too lateral.  The recipient site for nipple grafting was then deepithelialized sharply with a #15 blade.  Hemostasis was achieved with pressure.  The nipple grafts had been thinned on the backtable aggressively to facilitate graft take.  Excess areola as well as excess nipple were excised, and the graft was secured with 5-0 fast absorbing gut interrupted and running cuticular sutures.  The nipple was also anchored in a similar fashion.  A #18 gauge needle was used to piecrust the graft.  Bolster dressing consisting of Xeroform sheet with antibiotic-soaked cotton balls was held in place with 2-0 silk tie-overs and staples.  JPs were trimmed and put to bulb suction.  ABD pads were used as drain sponges.  Kerlix rolls were unfurled across the anterior chest for padding.  The thorax was then wrapped circumferentially with a double 6-inch long Ace for compression.  The patient was extubated, transferred to a stretcher and taken to the recovery room in satisfactory condition, having tolerated the procedure without difficulty or complication.  Of note, just prior to leaving the OR, the On-Q pump containing 550 mL of 0.2% ropivacaine was attached to the catheters.  This will deliver solution 2 mL per hour per catheter for the next 5 days.  The heat-sensitive valves were secured to the trunk with a Tegaderm.  We did not remove the Syed, since he did not produced sufficient urine output, so we would leave that in in the PACU.  Total tissue removed was right breast 1555 grams and left breast 1603 grams.  This was sent to Pathology for placement in formalin and formal pathology.         JUAN RYDER MD             D: 2020   T: 07/10/2020   MT: [id number]      Name:     MARLO POWERS   MRN:      [id number]        Account:        [id number]   :      1980           Procedure Date: 2020       Document: [id number]

## 2020-07-14 ENCOUNTER — OFFICE VISIT (OUTPATIENT)
Dept: PLASTIC SURGERY | Facility: CLINIC | Age: 40
End: 2020-07-14
Payer: COMMERCIAL

## 2020-07-14 DIAGNOSIS — Z90.13 S/P BILATERAL MASTECTOMY: Primary | ICD-10-CM

## 2020-07-14 LAB — COPATH REPORT: NORMAL

## 2020-07-14 ASSESSMENT — PAIN SCALES - GENERAL: PAINLEVEL: SEVERE PAIN (6)

## 2020-07-14 NOTE — RESULT ENCOUNTER NOTE
Dear Shakir,     Here are your recent results which are within the normal male range, on the lower 1/3 to 1/2 of the normal range. . Please continue with your current plan of care and let us know if you have any questions or concerns. We can adjust your dose later if it is not working well for you.    Regards,  Fam Chan MD

## 2020-07-14 NOTE — NURSING NOTE
Chief Complaint   Patient presents with     Surgical Followup     Pt here for 1 week post op       There were no vitals filed for this visit.    There is no height or weight on file to calculate BMI.      ROSE Breen NREMT                    No vitals take per provider

## 2020-07-14 NOTE — LETTER
7/14/2020       RE: Roseanna Dorsey  [address] [facility] [id number]     Dear Colleague,    Thank you for referring your patient, Roseanna Dorsey, to the [facility] PLASTIC AND RECONSTRUCTIVE SURGERY at [facility]. Please see a copy of my visit note below.    PLASTICS POST-OP   Shakir is a 40 year old individual who identifies as trans masculine with a history of gender dysphoria who presents 6 days post-op after a bilateral simple mastectomy with nipple graft reconstruction on 7/8/2020. Their partner is here today.     Shakir reports experiencing some nausea after surgery, and the anti-nausea meds caused some complications (low O2, narcoleptic symptoms). [facility]. They report the OnQ catheters significantly helped with pain management. They have been taking 5mg oxycodone mainly at night - last oxy was taken yesterday at lunch time. They do not need any medication refills today. Shakir also has been experiencing some significant pruritis from the ACE wrap.     PE: Chest: Nice upper chest contour and symmetry. Nipple grafts 100% take. Dermabond intact. Minimal standing cutaneous deformities, just some mild edema in bilateral anterior axillae. Some resolving ecchymosis along the bases. Photo taken with verbal consent.     A&P: 40 year old individual who identifies as trans masculine with a history of gender dysphoria who presents 6 days post-op after a bilateral simple mastectomy with nipple graft reconstruction on 7/8/2020. As RIOS drain output was within normal limits, RIOS drains were removed - patient felt nervous/anxious during this, but no other difficulties. Also removed OnQ catheters today. We discussed when to remove the Dermabond on the incision. They were given supplies for nipple graft dressings while they mature during the next week. Also instructed Shakir to wear their ACE wrap for an  additional week - can re-wrap on top of a t-shirt to minimize pruritis if they wish. Recommend they wait at least 1 additional week before they start driving.    I reviewed with the patient how long to maintain limited activities. We discussed scar reducing techniques, such as Mederma, silicone strips, vitamin E oil, emu oil, massage and when he may begin using these. Problems to look out for during the recovery period were discussed, including: spitting sutures, incision dehiscence, hematoma/seroma, thick scarring, fluid accumulation under skin flap, delayed nipple graft healing.     RTW will be scheduled for 7/20 (without travel for 6 weeks) - I filled out [facility] paperwork for this. All other questions were answered - they will follow up at 2 months post-op. Causes for returning sooner were discussed.     This note was prepared on behalf of Holli Santacruz MD by Lizette De La Cruz, a trained medical scribe, based on my observations and the provider's statements to me.     Again, thank you for allowing me to participate in the care of your patient.      Sincerely,    Holli Santacruz MD

## 2020-07-14 NOTE — PROGRESS NOTES
PLASTICS POST-OP   Shakir is a 40 year old individual who identifies as trans masculine with a history of gender dysphoria who presents 6 days post-op after a bilateral simple mastectomy with nipple graft reconstruction on 7/8/2020. Their partner is here today.     Shakir reports experiencing some nausea after surgery, and the anti-nausea meds caused some complications (low O2, narcoleptic symptoms). Ended up at the [facility] until they stabilized overnight. They report the OnQ catheters significantly helped with pain management. They have been taking 5mg oxycodone mainly at night - last oxy was taken yesterday at lunch time. They do not need any medication refills today. Shakir also has been experiencing some significant pruritis from the ACE wrap.     PE: Chest: Nice upper chest contour and symmetry. Nipple grafts 100% take. Dermabond intact. Minimal standing cutaneous deformities, just some mild edema in bilateral anterior axillae. Some resolving ecchymosis along the bases. Photo taken with verbal consent.     A&P: 40 year old individual who identifies as trans masculine with a history of gender dysphoria who presents 6 days post-op after a bilateral simple mastectomy with nipple graft reconstruction on 7/8/2020. As RIOS drain output was within normal limits, RIOS drains were removed - patient felt nervous/anxious during this, but no other difficulties. Also removed OnQ catheters today. We discussed when to remove the Dermabond on the incision. They were given supplies for nipple graft dressings while they mature during the next week. Also instructed Shakir to wear their ACE wrap for an additional week - can re-wrap on top of a t-shirt to minimize pruritis if they wish. Recommend they wait at least 1 additional week before they start driving.    I reviewed with the patient how long to maintain limited activities. We discussed scar reducing techniques, such as Mederma, silicone strips, vitamin E oil, emu oil, massage  and when he may begin using these. Problems to look out for during the recovery period were discussed, including: spitting sutures, incision dehiscence, hematoma/seroma, thick scarring, fluid accumulation under skin flap, delayed nipple graft healing.     RTW will be scheduled for 7/20 (without travel for 6 weeks) - I filled out [facility] paperwork for this. All other questions were answered - they will follow up at 2 months post-op. Causes for returning sooner were discussed.     This note was prepared on behalf of Holli Santacruz MD by Lizette De La Cruz, a trained medical scribe, based on my observations and the provider's statements to me.

## 2020-07-17 NOTE — OP NOTE
Procedure Date: 07/08/2020      ATTENDING:  Holli Santacruz MD      ASSISTANT:  Mago Solitario PA-C (No resident available.  PRERNA did 50% of case).      PREOPERATIVE DIAGNOSIS:  Gender dysphoria.      POSTOPERATIVE DIAGNOSIS:  Gender dysphoria.      PROCEDURE:  Bilateral simple mastectomies with nipple grafts.  On-Q placement.      ANESTHESIA:  GET.      ESTIMATED BLOOD LOSS:  200 mL.      INTRAVENOUS FLUIDS:  1600 mL.      URINE OUTPUT:  150 mL.      COUNTS:  Correct.      COMPLICATIONS:  None.      DRAINS:  RIOS x 2.      SPECIMENS:  Right breast tissue 1555 grams, left breast tissue 1603 grams.      INDICATIONS:  This is a 40-year-old biological female transitioning to male who has diagnosis of gender dysphoria and met WPATH and insurance criteria for gender-affirming top surgery.  Due to the patient's large breast volume and ptosis, they would require double incision technique for bilateral simple mastectomies.  The patient desired nipple reconstruction with nipple grafts.      DESCRIPTION OF PROCEDURE:  The patient was seen in the preoperative waiting area.  The operative sites were marked, including sternal notch, sternal midline, inframammary folds with extensions laterally for possible dog ears, vertical line through the nipple-areolar complex, transposed line from the mid humerus and transposed line from the underlying IMF fold.  Informed consent was obtained after reviewing the possible risks and complications including but not limited to the following:  Infection, bleeding, hematoma, seroma formation, poor healing, possible spitting sutures or dehiscence or hypertrophic scarring, possible altered sensation of the chest wall, either hypo or hypersensitivity, possible injury to surrounding neurovascular and musculoskeletal structures as well as intrathoracic and intraaxillary structures, possible asymmetries and residual deformities, possible need for further surgery, possible anesthetic risks such as  DVT, PE and cardiopulmonary arrest.  The patient was then brought to the operating room, placed supine on the OR table.  After general anesthesia was administered and the patient was oral endotracheally intubated, his arms were secured to arm boards with Ace wraps at 90 degrees from the table.  A Syed was placed.  The patient already had sequential compression devices on lower extremities prior to induction.  Pillows and padded safety straps were used for the thighs and forelegs.  Merry Hugger was placed on the lower body.  The patient was put into a sitting position, and adjustments were made to gain symmetry.  The chest breast area was then prepped and draped in the usual sterile fashion using ChloraPrep.  We made our incisions along the IMF marking using the PEAK PlasmaBlade on the right side and the Cedar Bookslab cautery with scalpel on the left.  We left approximately 2.5 cm of subcutaneous fat along the IMF incision before beveling down to the pectoralis fascia.  The breast mound was elevated off the pectoral fascia up towards the clavicle, medially towards the parasternal border, and laterally past the lateral border of the pectoralis major muscle.  Great care was taken to avoid disrupting the pectoralis fascia.  Once the breast was mobilized, it was pulled inferiorly and marked where it overlapped with the IMF incision.  This ended up being fairly close to our preoperative markings.  The line for amputation was made, and then the nipple areolar complex was harvested sharply with a #10 blade.  These nipple grafts were wrapped in normal saline and kept on the backtable, marked per side.  Breast mounds were then amputated in a similar fashion using cautery for the most part.  Great care was taken to not undermine the superior skin flaps at this time.  Any breast tissue removed was weighed off the backtable and ultimately sent to Pathology for histologic examination.  Once the breast mounds were amputated, the  breast superior skin flaps were thinned.  This gave a nice, flat, squared-off, symmetrical contoured onto the chest.  This patient had some slight tapering inferiorly at the bottom of the pectoralis muscle, but otherwise had decent anatomical contour.  Our incisions were temporarily skin stapled and the patient put into a sitting position again.  This allowed us to billy for eradication of lateral dog ears or flattening of the lateral thoracic tissue fullness.  We also joined our incisions in the midline since his breast started out so close.  We bevelled our medial line with a slight upward curve to delineate the difference between the two sides.  We then trimmed off all the excess tissues that we had marked, to gain better symmetry and better anatomical aesthetic.  When we were happy with this, our weights were within 50 grams of each other.  The dissection plane was then irrigated with Ancef solution.  Hemostasis was achieved with cautery.  A #15 round RIOS drain was introduced through a separate stab wound incision laterally and secured with 3-0 nylon suture.  Dual 10-inch On-Q catheters were percutaneously introduced from the epigastric region and draped along the superior aspect of the dissection pocket.  These were secured with benzoin and a Tegaderm.  Definitive closure was then achieved with 3-0 Vicryl deep dermal buried sutures followed by 4-0 Vicryl running subcuticular suture.  There was minimal dog-ear, although there was still lateral fullness, particularly in the sitting position.  We did try to debulk that area and changed the direction of pull by extending our incision more superiorly.  Once we were happy with our incisions and our contour, the area was prepared for nipple grafting.  A 1.5 to 2 cm diameter template was created, and with the patient in the sitting position, we localized the most aesthetic-appearing site for nipple grafts.  This was approximately 2.5 cm above the IMF incision and  approximately 11-12 cm from the midline.  Because this patient had a very wide chest, this did not appear to be too lateral.  The recipient site for nipple grafting was then deepithelialized sharply with a #15 blade.  Hemostasis was achieved with pressure.  The nipple grafts had been thinned on the backtable aggressively to facilitate graft take.  Excess areola as well as excess nipple were excised, and the graft was secured with 5-0 fast absorbing gut interrupted and running cuticular sutures.  The nipple was also anchored in a similar fashion.  A #18 gauge needle was used to piecrust the graft.  Bolster dressing consisting of Xeroform sheet with antibiotic-soaked cotton balls was held in place with 2-0 silk tie-overs and staples.  JPs were trimmed and put to bulb suction.  ABD pads were used as drain sponges.  Kerlix rolls were unfurled across the anterior chest for padding.  The thorax was then wrapped circumferentially with a double 6-inch long Ace for compression.  The patient was extubated, transferred to a stretcher and taken to the recovery room in satisfactory condition, having tolerated the procedure without difficulty or complication.  Of note, just prior to leaving the OR, the On-Q pump containing 550 mL of 0.2% ropivacaine was attached to the catheters.  This will deliver solution 2 mL per hour per catheter for the next 5 days.  The heat-sensitive valves were secured to the trunk with a Tegaderm.  We did not remove the Syed, since he did not produce sufficient urine output, so we would leave that in in the PACU.  Total tissue removed was right breast 1555 grams and left breast 1603 grams.  This was sent to Pathology for placement in formalin and formal pathology.      Revised procedure date lg 20            JUAN RYDER MD             D: 2020   T: 07/10/2020   MT: [id number]      Name:     MARLO POWERS   MRN:      [id number]        Account:        [id number]   :       1980           Procedure Date: 07/08/2020      Document: [id number]

## 2020-07-27 ENCOUNTER — PATIENT OUTREACH (OUTPATIENT)
Dept: PLASTIC SURGERY | Facility: CLINIC | Age: 40
End: 2020-07-27

## 2020-07-27 NOTE — PATIENT INSTRUCTIONS
Spoke with patient and notified Dr. Johnson reviewed thyroid blood work  Patient states she is feeling good and will continue current thyroid medication and follow up as scheduled  Levothyroxine e-scribed to [facility] pharmacy  Patient verbalize understanding   Spoke with pt regarding post operative concerns. Pt states they noted serosanguineous drainage leaking from the center of the incision today. State drainage is not odorous. Has soaked through 1 gauze in about 2 hours. Deny any increased redness, swelling, tenderness, or fevers/chills. Recommended pt continue to dress the area to soak up drainage. Pt to send photos of the area via [facility] for review. If drainage has not slowed tomorrow or pt has developed any additional symptoms will plan to see pt in clinic for evaluation. Pt states understanding and denies any additional questions or concerns. Celeste BOWMAN RNCC

## 2020-08-25 ENCOUNTER — OFFICE VISIT (OUTPATIENT)
Dept: PLASTIC SURGERY | Facility: CLINIC | Age: 40
End: 2020-08-25
Payer: COMMERCIAL

## 2020-08-25 VITALS
SYSTOLIC BLOOD PRESSURE: 115 MMHG | HEIGHT: 66 IN | OXYGEN SATURATION: 99 % | BODY MASS INDEX: 32.17 KG/M2 | WEIGHT: 200.2 LBS | HEART RATE: 60 BPM | DIASTOLIC BLOOD PRESSURE: 64 MMHG

## 2020-08-25 DIAGNOSIS — Z90.13 S/P BILATERAL MASTECTOMY: Primary | ICD-10-CM

## 2020-08-25 ASSESSMENT — MIFFLIN-ST. JEOR: SCORE: 1604.85

## 2020-08-25 ASSESSMENT — PAIN SCALES - GENERAL: PAINLEVEL: NO PAIN (0)

## 2020-08-25 NOTE — NURSING NOTE
"Chief Complaint   Patient presents with     Surgical Followup     6 week follow up.        Vitals:    08/25/20 1620   BP: 115/64   Pulse: 60   SpO2: 99%   Weight: 90.8 kg (200 lb 3.2 oz)   Height: 1.676 m (5' 6\")       Body mass index is 32.31 kg/m .    Jacqueline Zapata LPN                        "

## 2020-08-25 NOTE — PROGRESS NOTES
PLASTICS POST-OP  This is a 40 year old person with a history of gender dysphoria who presents 1.5 months post-op after a bilateral simple mastectomy with nipple graft reconstruction on 7/9.    They are experiencing some swelling at the posterior limits of the incisions that is particularly noticeable at the end of the day. They have been doing massage therapy and lympathic massage. They also would like to try ice and KT tape to help with swelling.  They also feel that the anterior upper chest is more sore by day's end from prolonged activity.     PE: Chest: Good chest contour. Mild swelling posterior axillary and anterior axillary folds. Good symmetry. Has small crevice between medial scar ends where previous serous drainage and crusting had been noted. No open wound currently. Overall nice result.   Photos taken with verbal consent    A&P: 40 year old trans person who presents 1.5 months post-op after a bilateral simple mastectomy with nipple graft reconstruction on 7/9.     We discussed scar reducing techniques, such as Mederma, silicone strips, vitamin E oil, emu oil, massage and when he may begin using these.     Schilling would like to start exercising again. They are free to do so with caution. They should be cognizant of how they're feeling with their range of motion.     He will follow up at 1 year post-op. Causes for returning sooner were discussed.     This note was prepared on behalf of Holli Santacruz MD by Asmita Kirk, a trained medical scribe, based on my observations and the provider's statements to me.

## 2020-08-25 NOTE — LETTER
8/25/2020     RE: Roseanna Dorsey  [id number] [facility] [id number]     Dear Colleague,    Thank you for referring your patient, Roseanna Dorsey, to the [facility] PLASTIC AND RECONSTRUCTIVE SURGERY at [facility]. Please see a copy of my visit note below.    PLASTICS POST-OP  This is a 40 year old person with a history of gender dysphoria who presents 1.5 months post-op after a bilateral simple mastectomy with nipple graft reconstruction on 7/9.    They are experiencing some swelling at the posterior limits of the incisions that is particularly noticeable at the end of the day. They have been doing massage therapy and lympathic massage. They also would like to try ice and KT tape to help with swelling.  They also feel that the anterior upper chest is more sore by day's end from prolonged activity.     PE: Chest: Good chest contour. Mild swelling posterior axillary and anterior axillary folds. Good symmetry. Has small crevice between medial scar ends where previous serous drainage and crusting had been noted. No open wound currently. Overall nice result.   Photos taken with verbal consent    A&P: 40 year old trans person who presents 1.5 months post-op after a bilateral simple mastectomy with nipple graft reconstruction on 7/9.     We discussed scar reducing techniques, such as Mederma, silicone strips, vitamin E oil, emu oil, massage and when he may begin using these.     Schilling would like to start exercising again. They are free to do so with caution. They should be cognizant of how they're feeling with their range of motion.     He will follow up at 1 year post-op. Causes for returning sooner were discussed.     This note was prepared on behalf of Holli Santacruz MD by Asmita Kirk, a trained medical scribe, based on my observations and the provider's statements to me.     Again, thank you for allowing me to participate in the care of  your patient.      Sincerely,    Holli Santacruz MD

## 2020-09-17 ENCOUNTER — TELEPHONE (OUTPATIENT)
Dept: OBGYN | Facility: CLINIC | Age: 40
End: 2020-09-17

## 2020-09-17 DIAGNOSIS — R39.9 UTI SYMPTOMS: Primary | ICD-10-CM

## 2020-09-17 DIAGNOSIS — R39.9 UTI SYMPTOMS: ICD-10-CM

## 2020-09-17 LAB
ALBUMIN UR-MCNC: ABNORMAL MG/DL
APPEARANCE UR: CLEAR
BILIRUB UR QL STRIP: NEGATIVE
COLOR UR AUTO: YELLOW
GLUCOSE UR STRIP-MCNC: NEGATIVE MG/DL
HGB UR QL STRIP: ABNORMAL
KETONES UR STRIP-MCNC: NEGATIVE MG/DL
LEUKOCYTE ESTERASE UR QL STRIP: ABNORMAL
NITRATE UR QL: NEGATIVE
PH UR STRIP: 7 PH (ref 5–7)
SOURCE: ABNORMAL
SP GR UR STRIP: 1.02 (ref 1–1.03)
UROBILINOGEN UR STRIP-ACNC: 0.2 EU/DL (ref 0.2–1)

## 2020-09-17 PROCEDURE — 87186 SC STD MICRODIL/AGAR DIL: CPT | Performed by: OBSTETRICS & GYNECOLOGY

## 2020-09-17 PROCEDURE — 81003 URINALYSIS AUTO W/O SCOPE: CPT | Performed by: OBSTETRICS & GYNECOLOGY

## 2020-09-17 PROCEDURE — 87086 URINE CULTURE/COLONY COUNT: CPT | Performed by: OBSTETRICS & GYNECOLOGY

## 2020-09-17 PROCEDURE — 87088 URINE BACTERIA CULTURE: CPT | Performed by: OBSTETRICS & GYNECOLOGY

## 2020-09-17 NOTE — TELEPHONE ENCOUNTER
Pt calling with questions about sx's  Transitioning to Male-on testosterone and has IUD    Sx's: urinates and when finishes - stings and cramps and then spots.  Does feel like she is going more frequently and does not feel like she isn't done; no urgency. No fever  Has not had period for months, spotting is definitely vaginal bright red, a little more this morning. Spotting just started yesterday morning and again today.    Dr Whitfield out of office today. Recommended starting w urine sample for UA/UC. Pt willing and aware AJ out of office - may not hear back until tomorrow. Has mychart.    1300 will leave urine.  Order placed.    Pt verbalized understanding, in agreement with plan, and voiced no further questions.  Routing to AJ as heads up  Corry Nieto RN on 9/17/2020 at 8:45 AM

## 2020-09-18 RX ORDER — CIPROFLOXACIN 500 MG/1
500 TABLET, FILM COATED ORAL 2 TIMES DAILY
Qty: 10 TABLET | Refills: 0 | Status: SHIPPED | OUTPATIENT
Start: 2020-09-18 | End: 2020-09-23

## 2020-09-20 LAB
BACTERIA SPEC CULT: ABNORMAL
Lab: ABNORMAL
SPECIMEN SOURCE: ABNORMAL

## 2020-09-29 NOTE — PROGRESS NOTES
SUBJECTIVE:                                                   Roseanna Dorsey is a 40 year old adult who presents to clinic today for the following health issue(s):  Patient presents with:  IUD: IUD inserted on 2019        HPI:  ***    No LMP recorded. (Menstrual status: IUD)..     Patient is sexually active, .  Using IUD for contraception.    reports that he has never smoked. He has never used smokeless tobacco.    STD testing offered?  Declined    Health maintenance updated:  yes    Today's PHQ-2 Score:   PHQ-2 (  [employer]) 2020   Q1: Little interest or pleasure in doing things 0   Q2: Feeling down, depressed or hopeless 0   PHQ-2 Score 0   Q1: Little interest or pleasure in doing things -   Q2: Feeling down, depressed or hopeless -   PHQ-2 Score -     Today's PHQ-9 Score:   PHQ-9 SCORE 2020   PHQ-9 Total Score 1   Some encounter information is confidential and restricted. Go to Review Flowsheets activity to see all data.     Today's WENDI-7 Score:   WENDI-7 SCORE 2020   Total Score 0   Some encounter information is confidential and restricted. Go to Review Flowsheets activity to see all data.       Problem list and histories reviewed & adjusted, as indicated.  Additional history: as documented.    Patient Active Problem List   Diagnosis     Gender dysphoria in adult     Gender dysphoria in adolescent and adult     Gender dysphoria     Past Surgical History:   Procedure Laterality Date     CHOLECYSTECTOMY  age 21     DILATION AND CURETTAGE SUCTION, TREAT INCOMPLETE   10/16/2012    Procedure: DILATION AND CURETTAGE SUCTION, TREAT INCOMPLETE ;  suction dilation and curettage;  Surgeon: Wilda Whitfield MD;  Location:  GI     TRANSGENDER MASTECTOMY Bilateral 2020    Procedure: bilateral simple mastectomy,  bilateral nipple grafts, OnQ;  Surgeon: Holli Santacruz MD;  Location:  OR      Social History     Tobacco Use     Smoking status: Never Smoker  "    Smokeless tobacco: Never Used   Substance Use Topics     Alcohol use: Yes     Comment: Rare       *Patient is Adopted       Problem (# of Occurrences) Relation (Name,Age of Onset)    Unknown/Adopted (2) Mother, Father            Current Outpatient Medications   Medication Sig     Cholecalciferol (VITAMIN D3) 50 MCG (2000 UT) TABS      levonorgestrel (MIRENA) 20 MCG/24HR IUD 1 each (20 mcg) by Intrauterine route once     Testosterone Enanthate (XYOSTED) 50 MG/0.5ML SOAJ Inject 50 mg Subcutaneous once a week     Testosterone Enanthate (XYOSTED) 75 MG/0.5ML SOAJ Inject 75 mg Subcutaneous once a week     acetaminophen (TYLENOL) 325 MG tablet Take 2 tablets (650 mg) by mouth every 6 hours as needed for mild pain (Patient not taking: Reported on 10/2/2020)     No current facility-administered medications for this visit.      Allergies   Allergen Reactions     Penicillins      Sulfa Drugs        ROS:  12 point review of systems negative other than symptoms noted below or in the HPI.  {ROS - :858434::\"No urinary frequency or dysuria, bladder or kidney problems\"}      OBJECTIVE:     /54   Ht 1.676 m (5' 6\")   Wt 92.2 kg (203 lb 3.2 oz)   Breastfeeding No   BMI 32.80 kg/m    Body mass index is 32.8 kg/m .    Exam:  {[facility] EXAM CHOICES:259828}     In-Clinic Test Results:  No results found for this or any previous visit (from the past 24 hour(s)).    ASSESSMENT/PLAN:                                                      No diagnosis found.    There are no Patient Instructions on file for this visit.    ***    Wilda Whitfield MD  [facility]  "

## 2020-10-02 ENCOUNTER — OFFICE VISIT (OUTPATIENT)
Dept: OBGYN | Facility: CLINIC | Age: 40
End: 2020-10-02
Payer: COMMERCIAL

## 2020-10-02 VITALS
DIASTOLIC BLOOD PRESSURE: 54 MMHG | SYSTOLIC BLOOD PRESSURE: 116 MMHG | HEIGHT: 66 IN | WEIGHT: 203.2 LBS | BODY MASS INDEX: 32.66 KG/M2

## 2020-10-02 DIAGNOSIS — N90.4 LICHEN SCLEROSUS ET ATROPHICUS OF THE VULVA: ICD-10-CM

## 2020-10-02 DIAGNOSIS — Z79.890 LONG-TERM CURRENT USE OF TESTOSTERONE REPLACEMENT THERAPY: ICD-10-CM

## 2020-10-02 DIAGNOSIS — F64.0 GENDER DYSPHORIA IN ADULT: ICD-10-CM

## 2020-10-02 DIAGNOSIS — Z30.431 IUD CHECK UP: Primary | ICD-10-CM

## 2020-10-02 PROCEDURE — 99214 OFFICE O/P EST MOD 30 MIN: CPT | Performed by: OBSTETRICS & GYNECOLOGY

## 2020-10-02 RX ORDER — TRIAMCINOLONE ACETONIDE 1 MG/G
OINTMENT TOPICAL 2 TIMES DAILY
Qty: 60 G | Refills: 3 | Status: SHIPPED | OUTPATIENT
Start: 2020-10-02 | End: 2020-10-16

## 2020-10-02 ASSESSMENT — MIFFLIN-ST. JEOR: SCORE: 1618.46

## 2020-10-02 NOTE — PROGRESS NOTES
SUBJECTIVE:                                                   Shakir Dorsey is a 40 year old adult who presents to clinic today for the following health issue(s):  Patient presents with:  IUD: IUD inserted on 12/27/2019        HPI:  Patient is here s/p simple mastectomy for gender dysphoria. Got a mirena IUD 12/19 to hopefully minimize or eliminate periods until decision was made to do or not do bottom surgery.  On initial f/u there was still a monthly cycle that was longer lasting than it had been and still a lot of cramps. Lighter for the most part but some days still heavy. Wasn't sure if that would be tolerable long term and did discuss a hyst    In the meantime started on testosterone therapy about 4 monhs ago. Was on 50mg IM weekly for a month and then bumped to 75mg weekly for a month. Needs another testosterone draw this Tuesday and prefers to do it here since closer than [facility].    Periods now are almost nothing. Totally random spotting for a day or sometimes 2. A bit more first thing in the morning and then by evening almost nothing. Never needs tampon or liner even, just there with wiping. Still some random menstrual cramping here and there and not always tied to when has spotting but definitely tolerable.    Not totally sure if will have bottom surgery and definitely not for a little bit. Wants to see what effect the testosterone may have. If has enough clitoral growth from it alone than may not but wants to just live life and be with his kids and see how things go. Definitely getting much more acne on chin and especially back. Some hair growth on chin. Feels like much more clitoral sensation and a lot more libido but not noticing any anatomic changes of vulva/vagina    Had first UTI a month ago. Was postcoital. Treated with cipro for 5 days. E coli but 50-100k and resistant to several things. Those sx all resolved.  No other concerns    No LMP recorded. (Menstrual status: IUD)..     Patient is  sexually active, .  Using IUD for menstrual suppression. Same sex partner for contraception    reports that he has never smoked. He has never used smokeless tobacco.    STD testing offered?  Declined    Health maintenance updated:  yes    Today's PHQ-2 Score:   PHQ-2 (  [employer]) 2020   Q1: Little interest or pleasure in doing things 0   Q2: Feeling down, depressed or hopeless 0   PHQ-2 Score 0   Q1: Little interest or pleasure in doing things -   Q2: Feeling down, depressed or hopeless -   PHQ-2 Score -     Today's PHQ-9 Score:   PHQ-9 SCORE 2020   PHQ-9 Total Score 3     Today's WENDI-7 Score:   WENDI-7 SCORE 2020   Total Score 3       Problem list and histories reviewed & adjusted, as indicated.  Additional history: as documented.    Patient Active Problem List   Diagnosis     Gender dysphoria in adult     Gender dysphoria in adolescent and adult     Gender dysphoria     Past Surgical History:   Procedure Laterality Date     CHOLECYSTECTOMY  age 21     DILATION AND CURETTAGE SUCTION, TREAT INCOMPLETE   10/16/2012    Procedure: DILATION AND CURETTAGE SUCTION, TREAT INCOMPLETE ;  suction dilation and curettage;  Surgeon: Wilda Whitfield MD;  Location:  GI     TRANSGENDER MASTECTOMY Bilateral 2020    Procedure: bilateral simple mastectomy,  bilateral nipple grafts, OnQ;  Surgeon: Holli Santacruz MD;  Location:  OR      Social History     Tobacco Use     Smoking status: Never Smoker     Smokeless tobacco: Never Used   Substance Use Topics     Alcohol use: Yes     Comment: Rare       *Patient is Adopted       Problem (# of Occurrences) Relation (Name,Age of Onset)    Unknown/Adopted (2) Mother, Father            Current Outpatient Medications   Medication Sig     Cholecalciferol (VITAMIN D3) 50 MCG ( UT) TABS      levonorgestrel (MIRENA) 20 MCG/24HR IUD 1 each (20 mcg) by Intrauterine route once     Testosterone Enanthate (XYOSTED) 50 MG/0.5ML SOAJ Inject 50 mg  "Subcutaneous once a week     Testosterone Enanthate (XYOSTED) 75 MG/0.5ML SOAJ Inject 75 mg Subcutaneous once a week     acetaminophen (TYLENOL) 325 MG tablet Take 2 tablets (650 mg) by mouth every 6 hours as needed for mild pain (Patient not taking: Reported on 10/2/2020)     No current facility-administered medications for this visit.      Allergies   Allergen Reactions     Penicillins      Sulfa Drugs        ROS:  12 point review of systems negative other than symptoms noted below or in the HPI.  No urinary frequency or dysuria, bladder or kidney problems, POSITIVE for:, irregular menses, light cramping      OBJECTIVE:     /54   Ht 1.676 m (5' 6\")   Wt 92.2 kg (203 lb 3.2 oz)   Breastfeeding No   BMI 32.80 kg/m    Body mass index is 32.8 kg/m .    Exam:  Constitutional:  Appearance: Well nourished, well developed alert, in no acute distress  Neurologic:  Mental Status:  Oriented X3.  Normal strength and tone, sensory exam grossly normal, mentation intact and speech normal.    Psychiatric:  Mentation appears normal and affect normal/bright.  Pelvic Exam:  External Genitalia:     Normal appearance for age, no discharge present, no tenderness present, HAS MILD WHITENING OF PERINEUM(?) C/W LICHEN SCLEROSUS. MAY BE SOME ATROPHY DUE TO SUPPRESSION WITH TESTOSTERONE BUT THIS IS NOT TYPICAL. NO SIGNIFICANT CLITORAL GROWTH OR CHANGE NOTED  Vagina:    Normal vaginal vault without central or paravaginal defects, no discharge present, no inflammatory lesions present, no masses present  Bladder:     Nontender to palpation  Urethra:   Urethral Body:  Urethra palpation normal, urethra structural support normal   Urethral Meatus:  No erythema or lesions present  Cervix:     Appearance healthy, no lesions present, nontender to palpation, no bleeding present, string present  Uterus:     Nontender to palpation, no masses present, position anteflexed, mobility: normal  Adnexa:     No adnexal tenderness present, no adnexal " masses present  Perineum:     Perineum within normal limits, no evidence of trauma, no rashes or skin lesions present  Anus:     Anus within normal limits, no hemorrhoids present  Inguinal Lymph Nodes:     No lymphadenopathy present  Pubic Hair:     Normal pubic hair distribution for age  Genitalia and Groin:     No rashes present, no lesions present, no areas of discoloration, no masses present       In-Clinic Test Results:  No results found for this or any previous visit (from the past 24 hour(s)).    ASSESSMENT/PLAN:                                                        ICD-10-CM    1. IUD check up  Z30.431    2. Lichen sclerosus et atrophicus of the vulva  N90.4 triamcinolone (KENALOG) 0.1 % external ointment   3. Gender dysphoria in adult  F64.0    4. Long-term current use of testosterone replacement therapy  Z79.890          Patient is happy with the IUD for now as periods are starting to go away. Either from longer time with the mirena or more likely mirena with increasing dose of testosterone  Would anticipate near complete amenorrhea in coming months  As long as doesn't want hysterectomy for other gender confirming reasons, will continue with mirena for now. Approved for 6 yrs for contraception but only 5 for endometrial effect. However this is due to not seeking FDA approval for that specific indication and not b/c it isn't effective as many people are amenorrheic or nearly so for as long as 6-7+ yrs even.  Given testosterone has peripheral conversion to some degree, of estrogen, would need endometrial protection of endometrium with the mirena. However would feel comfortable using it 6 yrs and not 5 and then swapping it out  If does decide to proceed with hysterectomy at some point will f/U with me regarding that    Will return Tuesday and do a testosterone level here and have it sent to doc at [facility] so they can eval testosterone dosing    Though patient is not bothered or sx from this some mild L.S or at  the least eczematous changes were found on vulva  B/c mild and asx will hold off on clobetasol strength steroid and do triamcinolone  Encouraged to do BID for 2 full weeks since can't base treatment course on sx severity when asx. However the underlying skin inflammation should resolve with a more aggressive course and then be less likely to recur as quickly    Spent 25 min with the patient, >50% of which was in face to face counseling time    Wilda Whitfield MD  [facility]

## 2020-10-06 ENCOUNTER — TELEPHONE (OUTPATIENT)
Dept: OBGYN | Facility: CLINIC | Age: 40
End: 2020-10-06

## 2020-10-06 NOTE — TELEPHONE ENCOUNTER
DONNY to clarify lab orders. No labs from JAYNE ordered but there is Testosterone ordered from Dr. Chan     Lab appt 11/7/20 at 11am

## 2020-10-14 DIAGNOSIS — F64.0 GENDER DYSPHORIA IN ADOLESCENT AND ADULT: ICD-10-CM

## 2020-10-14 PROCEDURE — 84270 ASSAY OF SEX HORMONE GLOBUL: CPT | Performed by: OBSTETRICS & GYNECOLOGY

## 2020-10-14 PROCEDURE — 99000 SPECIMEN HANDLING OFFICE-LAB: CPT | Performed by: OBSTETRICS & GYNECOLOGY

## 2020-10-14 PROCEDURE — 36415 COLL VENOUS BLD VENIPUNCTURE: CPT | Performed by: OBSTETRICS & GYNECOLOGY

## 2020-10-14 PROCEDURE — 84403 ASSAY OF TOTAL TESTOSTERONE: CPT | Mod: 90 | Performed by: OBSTETRICS & GYNECOLOGY

## 2020-10-16 LAB
SHBG SERPL-SCNC: 14 NMOL/L (ref 30–135)
TESTOST FREE SERPL-MCNC: 17.85 NG/DL (ref 0.13–0.92)
TESTOST SERPL-MCNC: 573 NG/DL (ref 8–60)

## 2020-10-27 ENCOUNTER — VIRTUAL VISIT (OUTPATIENT)
Dept: FAMILY MEDICINE | Facility: CLINIC | Age: 40
End: 2020-10-27
Payer: COMMERCIAL

## 2020-10-27 ENCOUNTER — MEDICAL CORRESPONDENCE (OUTPATIENT)
Dept: HEALTH INFORMATION MANAGEMENT | Facility: CLINIC | Age: 40
End: 2020-10-27

## 2020-10-27 VITALS — WEIGHT: 203 LBS | BODY MASS INDEX: 32.77 KG/M2

## 2020-10-27 DIAGNOSIS — L70.0 ACNE VULGARIS: Primary | ICD-10-CM

## 2020-10-27 DIAGNOSIS — F64.0 GENDER DYSPHORIA IN ADOLESCENT AND ADULT: ICD-10-CM

## 2020-10-27 PROCEDURE — 99213 OFFICE O/P EST LOW 20 MIN: CPT | Mod: 95 | Performed by: FAMILY MEDICINE

## 2020-10-27 RX ORDER — CLINDAMYCIN PHOSPHATE 10 UG/ML
LOTION TOPICAL 2 TIMES DAILY
Qty: 60 ML | Refills: 4 | Status: SHIPPED | OUTPATIENT
Start: 2020-10-27 | End: 2023-01-06

## 2020-10-27 RX ORDER — TESTOSTERONE ENANTHATE 75 MG/.5ML
75 INJECTION SUBCUTANEOUS WEEKLY
Qty: 4 PEN | Refills: 3 | Status: SHIPPED | OUTPATIENT
Start: 2020-10-27 | End: 2021-02-22

## 2020-10-27 ASSESSMENT — ENCOUNTER SYMPTOMS
VOMITING: 0
PALPITATIONS: 0
POLYDIPSIA: 0
HEADACHES: 0
DYSPHORIC MOOD: 0
UNEXPECTED WEIGHT CHANGE: 0
CHEST TIGHTNESS: 0
ABDOMINAL PAIN: 0
FREQUENCY: 0
NERVOUS/ANXIOUS: 0
CHILLS: 0
SHORTNESS OF BREATH: 0
LIGHT-HEADEDNESS: 0
NUMBNESS: 0
FEVER: 0
WEAKNESS: 0

## 2020-10-27 NOTE — PROGRESS NOTES
"The patient has been notified of following:     \"This video visit will be conducted via a call between you and your physician/provider. We have found that certain health care needs can be provided without the need for an in-person physical exam.  This service lets us provide the care you need with a video conversation.  If a prescription is necessary we can send it directly to your pharmacy.  If lab work is needed we can place an order for that and you can then stop by our lab to have the test done at a later time.    If during the course of the call the physician/provider feels a video visit is not appropriate, you will not be charged for this service.\"     Patient has given verbal consent for Video visit? Yes    Patient would like the video invitation sent by: Send to e-mail at: [email or web address]    Video Start Time: 900              HPI     Shakir is a 40 year old individual that uses pronouns He/Him/His/Himself that presents today for follow up of:  masculinizing hormone therapy.   Alone or accompanied by: accompanied today by self  Gender identity: male  Started Hormone  therapy  2020  Continues on Xyosted 75 mg subcutaneous weekly  Any special concerns today?    Acne bad on shoulders and back, some face using face scrub  S/p top surgery x 3 1/2 months     On hormones?  YES +++ Shot day of the week?       Due for labs?  Yes      +++ Refills of meds needed?  Yes  Gender related body changes since last visit:   Voice starting to change  A little facial hair      Breakthrough bleeding? No: LMP spotting 2020, has IUD    New health concerns since last visit:  ---none    Past Surgical History:   Procedure Laterality Date     CHOLECYSTECTOMY  age 21     DILATION AND CURETTAGE SUCTION, TREAT INCOMPLETE   10/16/2012    Procedure: DILATION AND CURETTAGE SUCTION, TREAT INCOMPLETE ;  suction dilation and curettage;  Surgeon: Wilda Whitfield MD;  Location: [facility]     TRANSGENDER " MASTECTOMY Bilateral 7/8/2020    Procedure: bilateral simple mastectomy,  bilateral nipple grafts, OnQ;  Surgeon: Holli Santacruz MD;  Location: [facility] OR       Patient Active Problem List   Diagnosis     Gender dysphoria in adult     Gender dysphoria in adolescent and adult     Gender dysphoria       Current Outpatient Medications   Medication Sig Dispense Refill     acetaminophen (TYLENOL) 325 MG tablet Take 2 tablets (650 mg) by mouth every 6 hours as needed for mild pain (Patient not taking: Reported on 10/2/2020) 100 tablet 0     Cholecalciferol (VITAMIN D3) 50 MCG (2000 UT) TABS        levonorgestrel (MIRENA) 20 MCG/24HR IUD 1 each (20 mcg) by Intrauterine route once       Testosterone Enanthate (XYOSTED) 75 MG/0.5ML SOAJ Inject 75 mg Subcutaneous once a week 4 pen 3       History   Smoking Status     Never Smoker   Smokeless Tobacco     Never Used          Allergies   Allergen Reactions     Penicillins      Sulfa Drugs        There are no preventive care reminders to display for this patient.      Problem, Medication and Allergy Lists were reviewed and are current..         Review of Systems:   Review of Systems   Constitutional: Negative for chills, fever and unexpected weight change.   Eyes: Negative for visual disturbance.   Respiratory: Negative for chest tightness and shortness of breath.    Cardiovascular: Negative for chest pain, palpitations and leg swelling.   Gastrointestinal: Negative for abdominal pain and vomiting.   Endocrine: Negative for polydipsia and polyuria.   Genitourinary: Negative for frequency.   Neurological: Negative for weakness, light-headedness, numbness and headaches.   Psychiatric/Behavioral: Negative for dysphoric mood and suicidal ideas. The patient is not nervous/anxious.               Labs:   Results from last visit:  Orders Only on 10/14/2020   Component Date Value Ref Range Status     Testosterone Total 10/14/2020 573* 8 - 60 ng/dL Final    Comment: This test was  developed and its performance characteristics determined by the   [facility].   It has not been cleared or approved by the FDA. The laboratory is regulated   under CLIA as qualified to perform high-complexity testing. This test is used   for clinical purposes. It should not be regarded as investigational or for   research.       Sex Hormone Binding Globulin 10/14/2020 14* 30 - 135 nmol/L Final     Free Testosterone Calculated 10/14/2020 17.85* 0.13 - 0.92 ng/dL Final    Comment: 18-30 Years 0.08-0.74 ng/dL  31-40 Years 0.13-0.92 ng/dL  41-51 Years 0.11-0.58 ng/dL  Postmenopausal 0.06-0.38 ng/dL       Day 3 after injection;       EXAM:  Constitutional: healthy, alert and no distress   Respiratory: negative, Percussion normal. Good diaphragmatic excursion. Lungs clear  Psychiatric: mentation appears normal and affect normal/bright   Mild mod acne face, shoulders moderate  Assessment and Plan   1. Gender dysphoria  2. Acne  Clinically appropriate response to current hormone dose; testosterone in normal male range  Continue Xyosted  Labs prior to next visit: hgb, lipids  Legal name and gender marker letter  For skin : OTC differin nightly or BPO 2.5-5%  Clindamycin lotion bid      Follow up:  Follow up in 3 months.        Video-Visit Details    Type of service:  Video Visit    Video End Time (time video stopped): 924    Originating Location (pt. Location): Home    Distant Location (provider location):  [facility]     Mode of Communication:  Video Conference via video platform: [email or web address]    Fam Chan MD        Results by [facility]  Questions were elicited and answered.     Fam Chan MD

## 2020-11-23 ENCOUNTER — DOCUMENTATION ONLY (OUTPATIENT)
Dept: FAMILY MEDICINE | Facility: CLINIC | Age: 40
End: 2020-11-23

## 2021-02-14 ENCOUNTER — HEALTH MAINTENANCE LETTER (OUTPATIENT)
Age: 41
End: 2021-02-14

## 2021-02-22 ENCOUNTER — VIRTUAL VISIT (OUTPATIENT)
Dept: FAMILY MEDICINE | Facility: CLINIC | Age: 41
End: 2021-02-22
Payer: COMMERCIAL

## 2021-02-22 DIAGNOSIS — F64.0 GENDER DYSPHORIA IN ADOLESCENT AND ADULT: ICD-10-CM

## 2021-02-22 PROCEDURE — 99214 OFFICE O/P EST MOD 30 MIN: CPT | Mod: 95 | Performed by: FAMILY MEDICINE

## 2021-02-22 RX ORDER — TESTOSTERONE ENANTHATE 75 MG/.5ML
75 INJECTION SUBCUTANEOUS WEEKLY
Qty: 4 ML | Refills: 4 | Status: SHIPPED | OUTPATIENT
Start: 2021-02-22 | End: 2021-09-09

## 2021-02-22 ASSESSMENT — ENCOUNTER SYMPTOMS
FEVER: 0
CHILLS: 0
SHORTNESS OF BREATH: 0
UNEXPECTED WEIGHT CHANGE: 0
PSYCHIATRIC NEGATIVE: 1
HEADACHES: 0

## 2021-02-22 NOTE — PROGRESS NOTES
The patient has been notified of following:       Patient has given verbal consent for Video visit? Yes    Patient would like the video invitation sent by: Send to e-mail at: [email or web address]    Video Start Time: 11:43 AM              ZOEY Schilling is a 40 year old individual that uses pronouns He/Him/His/Himself that presents today for follow up of:  masculinizing hormone therapy.   Alone or accompanied by: accompanied today by  Gender identity: male  Started Hormone  therapy  2020  Continues on Other Xyosted 75 mg  weekly*.   Any special concerns today?    No new concerns  Clindamycin lotion helpful for acne    On hormones?  YES +++ Shot day of the week?       Due for labs?  Yes      +++ Refills of meds needed?  Yes  Gender related body changes since last visit:   Facial hair increased, now shaving a bit  Changes in muscle mass     Breakthrough bleeding? No:     New health concerns since last visit:  ---Working out 4-6x/wk working out  No other changes    Past Surgical History:   Procedure Laterality Date     CHOLECYSTECTOMY  age 21     DILATION AND CURETTAGE SUCTION, TREAT INCOMPLETE   10/16/2012    Procedure: DILATION AND CURETTAGE SUCTION, TREAT INCOMPLETE ;  suction dilation and curettage;  Surgeon: Wilda Whitfield MD;  Location:  GI     TRANSGENDER MASTECTOMY Bilateral 2020    Procedure: bilateral simple mastectomy,  bilateral nipple grafts, OnQ;  Surgeon: Holli Santacruz MD;  Location:  OR       Patient Active Problem List   Diagnosis     Gender dysphoria in adult     Gender dysphoria in adolescent and adult     Gender dysphoria       Current Outpatient Medications   Medication Sig Dispense Refill     acetaminophen (TYLENOL) 325 MG tablet Take 2 tablets (650 mg) by mouth every 6 hours as needed for mild pain 100 tablet 0     Cholecalciferol (VITAMIN D3) 50 MCG (2000) TABS        clindamycin (CLEOCIN T) 1 % external lotion Apply topically 2 times daily 60 mL 4      levonorgestrel (MIRENA) 20 MCG/24HR IUD 1 each (20 mcg) by Intrauterine route once       Testosterone Enanthate (XYOSTED) 75 MG/0.5ML SOAJ Inject 75 mg Subcutaneous once a week 4 pen 3       History   Smoking Status     Never Smoker   Smokeless Tobacco     Never Used          Allergies   Allergen Reactions     Penicillins      Sulfa Drugs        There are no preventive care reminders to display for this patient.      Problem, Medication and Allergy Lists were reviewed and are current..         Review of Systems:   Review of Systems   Constitutional: Negative for chills, fever and unexpected weight change.   Respiratory: Negative for shortness of breath.    Cardiovascular: Negative for chest pain.   Neurological: Negative for headaches.   Psychiatric/Behavioral: Negative.               Labs:   Results from last visit:  Orders Only on 10/14/2020   Component Date Value Ref Range Status     Testosterone Total 10/14/2020 573* 8 - 60 ng/dL Final    Comment: This test was developed and its performance characteristics determined by the   [facility].   It has not been cleared or approved by the FDA. The laboratory is regulated   under CLIA as qualified to perform high-complexity testing. This test is used   for clinical purposes. It should not be regarded as investigational or for   research.       Sex Hormone Binding Globulin 10/14/2020 14* 30 - 135 nmol/L Final     Free Testosterone Calculated 10/14/2020 17.85* 0.13 - 0.92 ng/dL Final    Comment: 18-30 Years 0.08-0.74 ng/dL  31-40 Years 0.13-0.92 ng/dL  41-51 Years 0.11-0.58 ng/dL  Postmenopausal 0.06-0.38 ng/dL       Did not do lipid and hgb, will extend lab order      EXAM:  Constitutional: healthy, alert and no distress   Psychiatric: mentation appears normal and affect normal/bright   Voice dropped, mild-mod face acne  Assessment and Plan   1. Gender dysphoria  2. Acne  Clinically appropriate  response to current hormone dose  To do previously ordered labs  Acne controlled on topical clindamycine  Follow-up 3-4 months           Video-Visit Details    Type of service:  Video Visit    Video End Time (time video stopped): 1152    Originating Location (pt. Location): Home    Distant Location (provider location):  [facility]     Mode of Communication:  Video Conference via video platform: Robinson Chan MD        Results by [facility]  Questions were elicited and answered.     Fam Chan MD

## 2021-02-24 DIAGNOSIS — F64.0 GENDER DYSPHORIA IN ADOLESCENT AND ADULT: Primary | ICD-10-CM

## 2021-02-24 LAB
CHOLEST SERPL-MCNC: 189 MG/DL
HDLC SERPL-MCNC: 37 MG/DL
HGB BLD-MCNC: 15.6 G/DL (ref 11.7–15.7)
LDLC SERPL CALC-MCNC: 124 MG/DL
NONHDLC SERPL-MCNC: 152 MG/DL
TRIGL SERPL-MCNC: 138 MG/DL

## 2021-02-24 PROCEDURE — 85018 HEMOGLOBIN: CPT | Performed by: FAMILY MEDICINE

## 2021-02-24 PROCEDURE — 36415 COLL VENOUS BLD VENIPUNCTURE: CPT | Performed by: FAMILY MEDICINE

## 2021-02-24 PROCEDURE — 80061 LIPID PANEL: CPT | Performed by: FAMILY MEDICINE

## 2021-03-09 NOTE — RESULT ENCOUNTER NOTE
Dear Shakir,     Here are your recent results which are within the expected normal range. Please continue with your current plan of care and let us know if you have any questions or concerns.    Regards,  Fam Chan MD

## 2021-05-26 ENCOUNTER — RECORDS - HEALTHEAST (OUTPATIENT)
Dept: ADMINISTRATIVE | Facility: CLINIC | Age: 41
End: 2021-05-26

## 2021-05-27 ENCOUNTER — RECORDS - HEALTHEAST (OUTPATIENT)
Dept: ADMINISTRATIVE | Facility: CLINIC | Age: 41
End: 2021-05-27

## 2021-09-08 DIAGNOSIS — F64.0 GENDER DYSPHORIA IN ADOLESCENT AND ADULT: ICD-10-CM

## 2021-09-09 RX ORDER — TESTOSTERONE ENANTHATE 75 MG/.5ML
75 INJECTION SUBCUTANEOUS WEEKLY
Qty: 4 ML | Refills: 1 | Status: SHIPPED | OUTPATIENT
Start: 2021-09-09 | End: 2021-09-13

## 2021-09-09 NOTE — TELEPHONE ENCOUNTER
REFILL REQUEST    Medication Requested: Testosterone Enanthate (XYOSTED) 75 MG/0.5ML SOAJ  Directions: Sig - Route: Inject 75 mg Subcutaneous once a week - Subcutaneous    Last Seen: 02/22/2021  RTC: 4 months  Cancel: 0  No Show: 0  Next Appointment: 09/13/2021    Incoming Refill From: patient via phone, confirmed pharmacy: [place] [address] [place] [address]  [address]    Medication refill pended per refill protocol and routed to provider for review. Lizzy Kinney, CMA

## 2021-09-13 ENCOUNTER — VIRTUAL VISIT (OUTPATIENT)
Dept: FAMILY MEDICINE | Facility: CLINIC | Age: 41
End: 2021-09-13
Payer: COMMERCIAL

## 2021-09-13 DIAGNOSIS — L70.0 ACNE VULGARIS: ICD-10-CM

## 2021-09-13 DIAGNOSIS — F64.0 GENDER DYSPHORIA IN ADOLESCENT AND ADULT: Primary | ICD-10-CM

## 2021-09-13 PROCEDURE — 99214 OFFICE O/P EST MOD 30 MIN: CPT | Mod: 95 | Performed by: FAMILY MEDICINE

## 2021-09-13 RX ORDER — TESTOSTERONE ENANTHATE 75 MG/.5ML
75 INJECTION SUBCUTANEOUS WEEKLY
Qty: 4 ML | Refills: 1 | Status: SHIPPED | OUTPATIENT
Start: 2021-09-13 | End: 2021-10-26

## 2021-09-13 ASSESSMENT — ENCOUNTER SYMPTOMS
UNEXPECTED WEIGHT CHANGE: 0
FEVER: 0
HEADACHES: 0
SHORTNESS OF BREATH: 0
CHILLS: 0

## 2021-09-13 NOTE — PROGRESS NOTES
The patient has been notified of following:       Patient has given verbal consent for Video visit? Yes    Patient would like the video invitation sent by: Send to e-mail at: [email or web address]    Video Start Time: 8:40 AM              ZOEY Schilling is a 41 year old individual that uses pronouns He/Him/His/Himself that presents today for follow up of:  masculinizing hormone therapy.   Alone or accompanied by: accompanied today by  Gender identity: male  Started Hormone  therapy  2020  Continues on Other xyosted 75 mg weekly*.   Any special concerns today?    1. Using clindamycin topically, noticing more irrtiation/papules where hair coming in on body, chest  2. Having to work with pharmacy to make sure that Xyosted in stock when needs refill, sometimes has delay injection for 1-2 days     On hormones?  YES +++ Shot day of the week?       Due for labs?  Yes      +++ Refills of meds needed?  Yes  Gender related body changes since last visit:   More body hair, facial hair  Ongoing usual changes  Voice still changing    Breakthrough bleeding? No:     New health concerns since last visit:  ---none    Past Surgical History:   Procedure Laterality Date     CHOLECYSTECTOMY  age 21     DILATION AND CURETTAGE SUCTION, TREAT INCOMPLETE   10/16/2012    Procedure: DILATION AND CURETTAGE SUCTION, TREAT INCOMPLETE ;  suction dilation and curettage;  Surgeon: Wilda Whitfield MD;  Location:  GI     TRANSGENDER MASTECTOMY Bilateral 2020    Procedure: bilateral simple mastectomy,  bilateral nipple grafts, OnQ;  Surgeon: Holli Santacruz MD;  Location:  OR       Patient Active Problem List   Diagnosis     Gender dysphoria in adult     Gender dysphoria in adolescent and adult     Gender dysphoria       Current Outpatient Medications   Medication Sig Dispense Refill     acetaminophen (TYLENOL) 325 MG tablet Take 2 tablets (650 mg) by mouth every 6 hours as needed for mild pain 100 tablet 0      Cholecalciferol (VITAMIN D3) 50 MCG (2000 UT) TABS        clindamycin (CLEOCIN T) 1 % external lotion Apply topically 2 times daily 60 mL 4     levonorgestrel (MIRENA) 20 MCG/24HR IUD 1 each (20 mcg) by Intrauterine route once       Testosterone Enanthate (XYOSTED) 75 MG/0.5ML SOAJ Inject 75 mg Subcutaneous once a week 4 mL 1       History   Smoking Status     Never Smoker   Smokeless Tobacco     Never Used          Allergies   Allergen Reactions     Penicillins      Sulfa Drugs        There are no preventive care reminders to display for this patient.      Problem, Medication and Allergy Lists were reviewed and are current..         Review of Systems:   Review of Systems   Constitutional: Negative for chills, fever and unexpected weight change.   Respiratory: Negative for shortness of breath.    Cardiovascular: Negative for chest pain.   Neurological: Negative for headaches.              Labs:   Results from last visit:  Orders Only on 02/24/2021   Component Date Value Ref Range Status     Hemoglobin 02/24/2021 15.6  11.7 - 15.7 g/dL Final     Cholesterol 02/24/2021 189  <200 mg/dL Final     Triglycerides 02/24/2021 138  <150 mg/dL Final    Fasting specimen     HDL Cholesterol 02/24/2021 37* >49 mg/dL Final     LDL Cholesterol Calculated 02/24/2021 124* <100 mg/dL Final    Comment: Above desirable:  100-129 mg/dl  Borderline High:  130-159 mg/dL  High:             160-189 mg/dL  Very high:       >189 mg/dl       Non HDL Cholesterol 02/24/2021 152* <130 mg/dL Final    Comment: Above Desirable:  130-159 mg/dl  Borderline high:  160-189 mg/dl  High:             190-219 mg/dl  Very high:       >219 mg/dl             EXAM:  Home weight today: 185 lb  Constitutional: healthy, alert and no distress   Psychiatric: mentation appears normal and affect normal/bright   ++facial, arm hair   No facial acne   Assessment and Plan   1. Gender dysphoria  Response-: Clinically appropriate response to current hormone regimen/dose   2.  Acne vs. Folliculitis  Lab: testosterone level  Counseled skin symptoms may be folliculitis and/or acne  Continue to use clinda prn  In trouble areas    Follow-up 4 months             Video-Visit Details    Type of service:  Video Visit    Video End Time (time video stopped): 852    Originating Location (pt. Location): Home    Distant Location (provider location):  [facility]     Mode of Communication:  Video Conference via video platform: Davian Chan MD        Results by [facility]  Questions were elicited and answered.     Fam Chan MD

## 2021-09-19 ENCOUNTER — HEALTH MAINTENANCE LETTER (OUTPATIENT)
Age: 41
End: 2021-09-19

## 2021-10-26 DIAGNOSIS — F64.0 GENDER DYSPHORIA IN ADOLESCENT AND ADULT: ICD-10-CM

## 2021-10-26 RX ORDER — TESTOSTERONE ENANTHATE 75 MG/.5ML
75 INJECTION SUBCUTANEOUS WEEKLY
Qty: 8 ML | Refills: 0 | Status: SHIPPED | OUTPATIENT
Start: 2021-10-26 | End: 2022-01-10

## 2021-10-26 NOTE — TELEPHONE ENCOUNTER
Requested Medication: Xyosted  Dose:  75mg  Quantity: 8  Refills: 0    Inject 75 mg once weekly    Last seen at [facility]: 9/13 - 4 mo follow up  Next Appointment with Provider:  Visit date not found      Concetta Callahan CMA    Patient is requesting that the refill for November includes Decembers as they will be away in [place] for that month    I have not changed the original prescription to reflect this request

## 2021-10-27 ENCOUNTER — LAB (OUTPATIENT)
Dept: LAB | Facility: CLINIC | Age: 41
End: 2021-10-27
Payer: COMMERCIAL

## 2021-10-27 DIAGNOSIS — F64.0 GENDER DYSPHORIA IN ADOLESCENT AND ADULT: ICD-10-CM

## 2021-10-27 PROCEDURE — 84403 ASSAY OF TOTAL TESTOSTERONE: CPT

## 2021-10-27 PROCEDURE — 84270 ASSAY OF SEX HORMONE GLOBUL: CPT

## 2021-10-27 PROCEDURE — 36415 COLL VENOUS BLD VENIPUNCTURE: CPT

## 2021-10-28 LAB — SHBG SERPL-SCNC: 18 NMOL/L (ref 11–135)

## 2021-11-02 LAB
SHBG SERPL-SCNC: 18 NMOL/L (ref 11–135)
TESTOST FREE SERPL-MCNC: 12.57 NG/DL
TESTOST SERPL-MCNC: 452 NG/DL (ref 8–950)

## 2021-11-22 NOTE — RESULT ENCOUNTER NOTE
Dear Shakir,     Here are your recent results which are within the expected range. Please continue with your current plan of care and let us know if you have any questions or concerns.    Regards,  Fam Chan MD

## 2021-11-23 ENCOUNTER — OFFICE VISIT (OUTPATIENT)
Dept: PLASTIC SURGERY | Facility: CLINIC | Age: 41
End: 2021-11-23
Payer: COMMERCIAL

## 2021-11-23 VITALS
OXYGEN SATURATION: 95 % | TEMPERATURE: 98.3 F | HEIGHT: 66 IN | BODY MASS INDEX: 31.02 KG/M2 | WEIGHT: 193 LBS | HEART RATE: 66 BPM | DIASTOLIC BLOOD PRESSURE: 76 MMHG | SYSTOLIC BLOOD PRESSURE: 125 MMHG

## 2021-11-23 DIAGNOSIS — Z90.13 S/P BILATERAL MASTECTOMY: Primary | ICD-10-CM

## 2021-11-23 PROCEDURE — 99212 OFFICE O/P EST SF 10 MIN: CPT | Performed by: SURGERY

## 2021-11-23 ASSESSMENT — PAIN SCALES - GENERAL: PAINLEVEL: NO PAIN (0)

## 2021-11-23 ASSESSMENT — MIFFLIN-ST. JEOR: SCORE: 1567.19

## 2021-11-23 NOTE — PROGRESS NOTES
PLASTICS POST-OP  This is a 41 year old person with a history of gender dysphoria who presents 16 months post-op after a bilateral simple mastectomy with nipple graft reconstruction on 07/08/2020. They are here today by themselves. They are super happy and running around shirtless.They work out shirtless. Patient states that they have minor posterior lateral tightness occasionally, especially when lifting. Lymphatic and scar work with a therapist, Heather Freire weekly in [place] for 3 months postop.     PE: Chest: Nice upper chest contour.   Good symmetry.   Incisions touch at midline   Minor hypopigmentation of L nipple graft @ 7:00 position.  Slight cleavage at midline.   No bilateral dog ears.  Tiny dog ear on L lateral when facing posteriorly    Photos taken with verbal consent.     A&P: 41 year old person who presents 16 months post-op after a bilateral simple mastectomy with nipple graft conservation on 07/08/2020     Overall Schilling is healing well. No limits.     They will follow up PRN. Causes for returning sooner were discussed.     Total time = 5 minutes, spent on the date of encounter doing chart review, history and physical, dressing changes, documentation, patient education, and any further activity as noted above.     This note was prepared on behalf of Holli Santacruz MD by Cortney Mueller, a trained medical scribe, based on my observations and the provider's statements to me.

## 2021-11-23 NOTE — NURSING NOTE
"Chief Complaint   Patient presents with     DAY Schilling, is being seen today for a one year follow up.       Vitals:    11/23/21 1448   BP: 125/76   BP Location: Left arm   Patient Position: Chair   Cuff Size: Adult Large   Pulse: 66   Temp: 98.3  F (36.8  C)   TempSrc: Oral   SpO2: 95%   Weight: 87.5 kg (193 lb)   Height: 1.676 m (5' 6\")       Body mass index is 31.15 kg/m .      Pao Florentino LPN    "

## 2021-11-23 NOTE — LETTER
11/23/2021       RE: Shakir Dumont  [id number] [address] [id number]     Dear Colleague,    Thank you for referring your patient, Shakir Dumont, to the [facility] [place] at [facility]. Please see a copy of my visit note below.    PLASTICS POST-OP  This is a 41 year old person with a history of gender dysphoria who presents 16 months post-op after a bilateral simple mastectomy with nipple graft reconstruction on 07/08/2020. They are here today by themselves. They are super happy and running around shirtless.They work out shirtless. Patient states that they have minor posterior lateral tightness occasionally, especially when lifting. Lymphatic and scar work with a therapist, Heather Freire weekly in [place] for 3 months postop.     PE: Chest: Nice upper chest contour.   Good symmetry.   Incisions touch at midline   Minor hypopigmentation of L nipple graft @ 7:00 position.  Slight cleavage at midline.   No bilateral dog ears.  Tiny dog ear on L lateral when facing posteriorly    Photos taken with verbal consent.     A&P: 41 year old person who presents 16 months post-op after a bilateral simple mastectomy with nipple graft conservation on 07/08/2020     Overall Shakir is healing well. No limits.     They will follow up PRN. Causes for returning sooner were discussed.     Total time = 5 minutes, spent on the date of encounter doing chart review, history and physical, dressing changes, documentation, patient education, and any further activity as noted above.     This note was prepared on behalf of Holli Santacruz MD by Cortney Mueller, a trained medical scribe, based on my observations and the provider's statements to me.         Again, thank you for allowing me to participate in the care of your patient.      Sincerely,    Holli Santacruz MD

## 2022-01-10 ENCOUNTER — VIRTUAL VISIT (OUTPATIENT)
Dept: FAMILY MEDICINE | Facility: CLINIC | Age: 42
End: 2022-01-10
Payer: COMMERCIAL

## 2022-01-10 DIAGNOSIS — F64.0 GENDER DYSPHORIA IN ADOLESCENT AND ADULT: Primary | ICD-10-CM

## 2022-01-10 PROCEDURE — 99213 OFFICE O/P EST LOW 20 MIN: CPT | Mod: 95 | Performed by: FAMILY MEDICINE

## 2022-01-10 RX ORDER — TESTOSTERONE ENANTHATE 75 MG/.5ML
75 INJECTION SUBCUTANEOUS WEEKLY
Qty: 8 ML | Refills: 3 | Status: SHIPPED | OUTPATIENT
Start: 2022-01-10 | End: 2022-06-20

## 2022-01-10 ASSESSMENT — ENCOUNTER SYMPTOMS
UNEXPECTED WEIGHT CHANGE: 0
SHORTNESS OF BREATH: 0
HEADACHES: 0
CHILLS: 0
FEVER: 0

## 2022-01-10 NOTE — PROGRESS NOTES
The patient has been notified of following:       Patient has given verbal consent for Video visit? Yes    Patient would like the video invitation sent by: Other e-mail: [employer]    Video Start Time: 3:25 PM              ZOEY Schilling is a 41 year old individual that uses pronouns He/Him/His/Himself that presents today for follow up of:  masculinizing hormone therapy.   Alone or accompanied by: accompanied today by  Gender identity: male  Started Hormone  therapy    Continues onother Xyosted 75 mg weekly    Any special concerns today?   No new concerns, hormone therapy going well    On hormones?  YES +++ Shot day of the week?       Due for labs?  Yes      +++ Refills of meds needed?  Yes  Gender related body changes since last visit:   No new changes, ongoing same changes    Breakthrough bleeding? No:     New health concerns since last visit:  ---none    Past Surgical History:   Procedure Laterality Date     CHOLECYSTECTOMY  age 21     DILATION AND CURETTAGE SUCTION, TREAT INCOMPLETE   10/16/2012    Procedure: DILATION AND CURETTAGE SUCTION, TREAT INCOMPLETE ;  suction dilation and curettage;  Surgeon: Wilda Whitfield MD;  Location:  GI     TRANSGENDER MASTECTOMY Bilateral 2020    Procedure: bilateral simple mastectomy,  bilateral nipple grafts, OnQ;  Surgeon: Holli Santacruz MD;  Location: [facility] OR       Patient Active Problem List   Diagnosis     Gender dysphoria in adult     Gender dysphoria in adolescent and adult     Gender dysphoria       Current Outpatient Medications   Medication Sig Dispense Refill     acetaminophen (TYLENOL) 325 MG tablet Take 2 tablets (650 mg) by mouth every 6 hours as needed for mild pain 100 tablet 0     Cholecalciferol (VITAMIN D3) 50 MCG ( UT) TABS        clindamycin (CLEOCIN T) 1 % external lotion Apply topically 2 times daily 60 mL 4     levonorgestrel (MIRENA) 20 MCG/24HR IUD 1 each (20 mcg) by Intrauterine route once       Testosterone  Enanthate (XYOSTED) 75 MG/0.5ML SOAJ Inject 75 mg Subcutaneous once a week 8 mL 0       History   Smoking Status     Never Smoker   Smokeless Tobacco     Never Used          Allergies   Allergen Reactions     Penicillins      Sulfa Drugs        There are no preventive care reminders to display for this patient.      Problem, Medication and Allergy Lists were reviewed and are current..         Review of Systems:   Review of Systems   Constitutional: Negative for chills, fever and unexpected weight change.   Respiratory: Negative for shortness of breath.    Cardiovascular: Negative for chest pain.   Neurological: Negative for headaches.              Labs:   Results from last visit:  Lab on 10/27/2021   Component Date Value Ref Range Status     Sex Hormone Binding Globulin 10/27/2021 18  11 - 135 nmol/L Final    Female Reference Range:  David Stage I:  nmol/L  David Stage II:  nmol/L  David Stage III: 12-98 nmol/L  David Stage IV:  nmol/L  David Stage V:  nmol/L    Male Reference Range:  David Stage I:  nmol/L  David Stage II:  nmol/L  David Stage III:  nmol/L  David Stage IV: 11-60 nmol/L  David Stage V: 11-71 nmol/L     Free Testosterone Calculated 10/27/2021 12.57  ng/dL Final     Testosterone Total 10/27/2021 452  8 - 950 ng/dL Final    Comment:   MALE:  0 to 5 months:  ng/dL  6 months to 9 years: <2-20 ng/dL  10 to 11 years: <2-130 ng/dL  12 to 13 years: <2-800 ng/dL  14 years: <2-1200 ng/dL  15 to 16 years: 100-1200 ng/dL  17 to 18 years: 300-1200 ng/dL  19 years and older: 240-950 ng/dL    FEMALE:  0 to 5 months: 20-80 ng/dL  6 months to 9 years: <2-20 ng/dL  10 to 11 years: <2-44 ng/dL  12 to 16 years: <2-75 ng/dL  17 to 18 years: 20-75 ng/dL  19 years and older: 8-60 ng/dL    Values by David Stage:  Stage I (prepubertal)  Male: <2 to 20 ng/dL  Female: <2 to 20 ng/dL    Stage II  Male: 8 to 66 ng/dL  Female: <2 to 47 ng/dL    Stage III  Male: 26 to 800  "ng/dL  Female: 17 to 75 ng/dL    Stage IV  Male: 85 to 1200 ng/dL  Female: 20 to 75 ng/dL    Stage V (young adult)  Male: 300 to 950 ng/dL  Female: 12 to 60 ng/dL    Puberty onset (transition from David Stage I to David Stage II) occurs for boys at a median age of 11.5 years and for girls at median age of 10.5 years. There is evidence that it may occur up to 1 year                            earlier in obese girls and in  girls. For boys there is no definite proven relationship between puberty onset and body weight or ethnic origin. Progression through David stages is variable. David Stage V (young adult) should be reached by age 18.         Sex Hormone Binding Globulin 10/27/2021 18  11 - 135 nmol/L Final         EXAM:  Vitals from Plastic surgery visit 11/23/2021   /76 (BP Location: Left arm, Patient Position: Chair, Cuff Size: Adult Large)   Pulse 66   Temp 98.3  F (36.8  C) (Oral)   Ht 1.676 m (5' 6\")   Wt 87.5 kg (193 lb)   SpO2 95%      Today:  Constitutional: healthy, alert and no distress   Psychiatric: mentation appears normal and affect normal/bright     Assessment and Plan   1. Gender dysphoria  Response-: Clinically appropriate response to current hormone regimen/dose    Lab:hgb  Refills given  Follow-up 4-5 months           Video-Visit Details    Type of service:  Video Visit    Video End Time (time video stopped): *332    Originating Location (pt. Location): Home    Distant Location (provider location):  [facility]     Mode of Communication:  Video Conference via video platform: Davian Chan MD        Results by kerline  Questions were elicited and answered.     Fam Chan MD    "

## 2022-03-06 ENCOUNTER — HEALTH MAINTENANCE LETTER (OUTPATIENT)
Age: 42
End: 2022-03-06

## 2022-05-06 ENCOUNTER — MYC MEDICAL ADVICE (OUTPATIENT)
Dept: OBGYN | Facility: CLINIC | Age: 42
End: 2022-05-06
Payer: COMMERCIAL

## 2022-06-14 NOTE — PROGRESS NOTES
SUBJECTIVE:                                                   Shakir Dumont is a 42 year old adult who presents to clinic today for the following health issue(s):  Patient presents with:  IUD: C/o significant bilateral lower abdominal cramping. Ongoing for a few months, has trouble sleeping d/t pain. Taking tylenol and this does not relieve pain.     HPI:  Today, the patient reports no bleeding since placement of the mirena IUD 12/19. This was done as patient was pursuing transitioning and was starting on testosterone and wanted to avoid any menses until ready to consider bottom surgery or just hyst.  Has had continued  painful cramping in the lower abdomen almost essentially since the IUD was placed last visit. Patient notes that he was spotting more often but light and on/off the first few months but that has since stopped. It is now this constant pain and cramping and it is extremely painful at times. Never said anything to wife Juana nor contacted me as admits there is a lot of this that causes identifying and processing female issues and when identifies as a man it is very difficult to discuss these things or to even come to a gyn office but finally couldn't stand it and sherri called to make appointment.    The cramping especially ramped up after starting on higher dose testosterone also testosterone. The cramping happens regularly without doing anything and it's horrible especially right after sex. It is not necessarily a post orgasm pain but just post sex pain more central in the pelvis rather than either side. He says that the pain is there before he goes to bed, and is disturbing his sleep at night. He was not taking anything for pain relief and did not want to call in to the clinic initially, which his wife eventually called in for him.     Patient has decided to get a hysterectomy done but would not want it to happen during the summer. He would like to know more about the after care from  surgery.    Patient notes having trouble getting out of anesthesia and says that he cannot function properly and threw up last time. He says that it takes longer for him to stand or move around when he is getting out of anesthesia and would like to find ways to help with this.     No LMP recorded. (Menstrual status: IUD).    Patient is sexually active, .  Using N/A for contraception.    reports that he has never smoked. He has never used smokeless tobacco.    STD testing offered?  Declined    Health maintenance updated:  yes    Today's PHQ-2 Score:   PHQ-2 (  [employer]) 2021   Q1: Little interest or pleasure in doing things 0   Q2: Feeling down, depressed or hopeless 0   PHQ-2 Score 0   PHQ-2 Total Score (12-17 Years)- Positive if 3 or more points; Administer PHQ-A if positive -   Q1: Little interest or pleasure in doing things -   Q2: Feeling down, depressed or hopeless -   PHQ-2 Score -     Today's PHQ-9 Score:   PHQ-9 SCORE 2020   PHQ-9 Total Score 3     Today's WENDI-7 Score:   WENDI-7 SCORE 2020   Total Score 3       Problem list and histories reviewed & adjusted, as indicated.  Additional history: as documented.    Patient Active Problem List   Diagnosis     Gender dysphoria in adult     Gender dysphoria     Past Surgical History:   Procedure Laterality Date     CHOLECYSTECTOMY  age 21     DILATION AND CURETTAGE SUCTION, TREAT INCOMPLETE   10/16/2012    Procedure: DILATION AND CURETTAGE SUCTION, TREAT INCOMPLETE ;  suction dilation and curettage;  Surgeon: Wilda Whitfield MD;  Location:  GI     TRANSGENDER MASTECTOMY Bilateral 2020    Procedure: bilateral simple mastectomy,  bilateral nipple grafts, OnQ;  Surgeon: Holli Santacruz MD;  Location:  OR      Social History     Tobacco Use     Smoking status: Never Smoker     Smokeless tobacco: Never Used   Substance Use Topics     Alcohol use: Yes     Comment: Rare       *Patient is Adopted       Problem (# of  "Occurrences) Relation (Name,Age of Onset)    Unknown/Adopted (2) Mother, Father            Current Outpatient Medications   Medication Sig     clindamycin (CLEOCIN T) 1 % external lotion Apply topically 2 times daily     scopolamine (TRANSDERM) 1 MG/3DAYS 72 hr patch Place 1 patch onto the skin every 72 hours And place first one the the evening before surgery     Testosterone Enanthate (XYOSTED) 75 MG/0.5ML SOAJ Inject 75 mg Subcutaneous once a week     No current facility-administered medications for this visit.     Allergies   Allergen Reactions     Penicillins      Sulfa Drugs        ROS:  12 point review of systems negative other than symptoms noted below or in the HPI.  Genitourinary: Cramps  No urinary frequency or dysuria, bladder or kidney problems, POSITIVE for:, pelvic pain      OBJECTIVE:     /68   Ht 1.676 m (5' 6\")   Wt 91.2 kg (201 lb)   BMI 32.44 kg/m    Body mass index is 32.44 kg/m .    Exam:  Constitutional:  Appearance: Well nourished, well developed alert, in no acute distress  Chest:  Respiratory Effort:  Breathing unlabored. Clear to auscultation bilaterally.   Cardiovascular: Heart: Auscultation:  Regular rate, normal rhythm, no murmurs present  Gastrointestinal:  Abdominal Examination:  Abdomen nontender to palpation, tone normal without rigidity or guarding, no masses present, umbilicus without lesions; Liver/Spleen:  No hepatomegaly present, liver nontender to palpation; Hernias:  No hernias present  Pelvic Exam:  External Genitalia:     Normal appearance for age, no discharge present, no tenderness present, no inflammatory lesions present, color normal  Vagina:    Normal vaginal vault without central or paravaginal defects, no discharge present, no inflammatory lesions present, no masses present  Bladder:     Nontender to palpation  Urethra:   Urethral Body:  Urethra palpation normal, urethra structural support normal   Urethral Meatus:  No erythema or lesions present  Cervix:  "    Appearance healthy, no lesions present, nontender to palpation, no bleeding present, string present  Uterus:     Nontender to palpation, no masses present, position anteflexed, mobility: normal  Adnexa:     No adnexal tenderness present, no adnexal masses present  Perineum:     Perineum within normal limits, no evidence of trauma, no rashes or skin lesions present  Anus:     Anus within normal limits, no hemorrhoids present  Inguinal Lymph Nodes:     No lymphadenopathy present  Pubic Hair:     Normal pubic hair distribution for age  Genitalia and Groin:     No rashes present, no lesions present, no areas of discoloration, no masses present       In-Clinic Test Results:  No results found for this or any previous visit (from the past 24 hour(s)).    ASSESSMENT/PLAN:                                                        ICD-10-CM    1. Pelvic pain in female  R10.2 REMOVE INTRAUTERINE DEVICE   2. Female-to-male transgender person  Z78.9 REMOVE INTRAUTERINE DEVICE   3. Presence of 52 mg levonorgestrel-releasing intrauterine device (IUD)  Z97.5    4. Encounter for IUD removal  Z30.432 REMOVE INTRAUTERINE DEVICE   5. Postoperative nausea and vomiting  R11.2 scopolamine (TRANSDERM) 1 MG/3DAYS 72 hr patch    Z98.890    6. Gender dysphoria in adolescent and adult  F64.0 Hemoglobin         The constant cramping could be the uterus getting smaller and with the foreign body in it with the IUD it is causing this, or it could be that between female hormonal suppression of testosterone but some other hormonal breakthrough effects there could be intermittent cyst, uterine cramping and irritability, etc.    Discussed removal of the IUD today and that is absolutely the appropriate thing to do. Cautioned that depending on testosterone suppression he may or may not get some bleeding or menses in the coming months. States this is more favorable than the constant pain so is willing to risk that.  iud removed without issue  today    Discussed TLH and BSO and the impacts on transgender identity, future health, impacts of oopherectomy vs hormonal suppression of ovaries with teststorone anyway  Typically v.m sx don't occur b/c of the testosterone and some peripheral conversion of that to E2 and in fat stores but nonhormonal ways it can be managed    Reviewed the general surgery risks with bleeding, infection, injury to bowel/bladder/ureters/nerves. Healing impacts from low E2 from the testosterone though no atrophy of genitalia is noted.  x2 in the past w/o c/s so this is minimial risk for scarring impacting it  reivewed recovery, length of time with pain, fatigue, return to work, etc  Would like to move forward with scheduling TLH/BSO/cysto in the fall and request for surg will be sent to surgery schedular and she will contact him.    Discussed scop patch behind the ear the night before surgery to help with periop anesthesia induced nausea. And rx will be sent in    On the same date of the encounter, 40 additional  Minutes, on top of just the procedure time to remove the IUD, were spent on chart review and completion including: imaging, lab work, previous visit notes by this provider,  and other provider notes, along with direct management of the patient's medical issues as above.        Wilda Whitfield MD  [facility] [place]    INDICATIONS:                                                      Is a pregnancy test required: No.  Was a consent obtained?  Yes, verbal.    Shakir Dumont is a 42 year old,, No LMP recorded. (Menstrual status: IUD). who presents today for IUD removal. His current IUD was placed 2019. He has had problems including severe pelvic cramping with the IUD. He requests removal of the IUD because of problems stated above    Today's PHQ-2 Score:   PHQ-2 (  [employer]) 2021   Q1: Little interest or pleasure in doing things 0   Q2: Feeling down, depressed or hopeless 0    PHQ-2 Score 0   PHQ-2 Total Score (12-17 Years)- Positive if 3 or more points; Administer PHQ-A if positive -   Q1: Little interest or pleasure in doing things -   Q2: Feeling down, depressed or hopeless -   PHQ-2 Score -       PROCEDURE:                                                      A speculum exam was performed and the cervix was visualized. The IUD string was visualized. Using ring forceps, the string  was grasped and the IUD removed intact.    POST PROCEDURE:                                                      The patient tolerated the procedure well with minimal discomfort. Patient was discharged in stable condition.    Call if bleeding, pain or fever occur.    Wilda Whitfield MD

## 2022-06-15 ENCOUNTER — OFFICE VISIT (OUTPATIENT)
Dept: OBGYN | Facility: CLINIC | Age: 42
End: 2022-06-15
Payer: COMMERCIAL

## 2022-06-15 VITALS
DIASTOLIC BLOOD PRESSURE: 68 MMHG | SYSTOLIC BLOOD PRESSURE: 122 MMHG | HEIGHT: 66 IN | WEIGHT: 201 LBS | BODY MASS INDEX: 32.3 KG/M2

## 2022-06-15 DIAGNOSIS — F64.0 GENDER DYSPHORIA IN ADOLESCENT AND ADULT: ICD-10-CM

## 2022-06-15 DIAGNOSIS — R10.2 PELVIC PAIN IN FEMALE: Primary | ICD-10-CM

## 2022-06-15 DIAGNOSIS — Z97.5 PRESENCE OF 52 MG LEVONORGESTREL-RELEASING INTRAUTERINE DEVICE (IUD): ICD-10-CM

## 2022-06-15 DIAGNOSIS — Z78.9 FEMALE-TO-MALE TRANSGENDER PERSON: ICD-10-CM

## 2022-06-15 DIAGNOSIS — R11.2 POSTOPERATIVE NAUSEA AND VOMITING: ICD-10-CM

## 2022-06-15 DIAGNOSIS — Z30.432 ENCOUNTER FOR IUD REMOVAL: ICD-10-CM

## 2022-06-15 DIAGNOSIS — Z98.890 POSTOPERATIVE NAUSEA AND VOMITING: ICD-10-CM

## 2022-06-15 LAB — HGB BLD-MCNC: 16.1 G/DL (ref 11.7–17.7)

## 2022-06-15 PROCEDURE — 36415 COLL VENOUS BLD VENIPUNCTURE: CPT | Performed by: OBSTETRICS & GYNECOLOGY

## 2022-06-15 PROCEDURE — 58301 REMOVE INTRAUTERINE DEVICE: CPT | Mod: KX | Performed by: OBSTETRICS & GYNECOLOGY

## 2022-06-15 PROCEDURE — 85018 HEMOGLOBIN: CPT | Performed by: OBSTETRICS & GYNECOLOGY

## 2022-06-15 PROCEDURE — 99215 OFFICE O/P EST HI 40 MIN: CPT | Mod: 25 | Performed by: OBSTETRICS & GYNECOLOGY

## 2022-06-15 RX ORDER — SCOLOPAMINE TRANSDERMAL SYSTEM 1 MG/1
1 PATCH, EXTENDED RELEASE TRANSDERMAL
Qty: 2 PATCH | Refills: 0 | Status: SHIPPED | OUTPATIENT
Start: 2022-06-15 | End: 2022-09-30

## 2022-06-20 DIAGNOSIS — F64.0 GENDER DYSPHORIA IN ADOLESCENT AND ADULT: ICD-10-CM

## 2022-06-20 RX ORDER — TESTOSTERONE ENANTHATE 75 MG/.5ML
75 INJECTION SUBCUTANEOUS WEEKLY
Qty: 8 ML | Refills: 3 | Status: SHIPPED | OUTPATIENT
Start: 2022-06-20 | End: 2022-11-07

## 2022-06-22 ENCOUNTER — PREP FOR PROCEDURE (OUTPATIENT)
Dept: OBGYN | Facility: CLINIC | Age: 42
End: 2022-06-22

## 2022-06-22 ENCOUNTER — TELEPHONE (OUTPATIENT)
Dept: OBGYN | Facility: CLINIC | Age: 42
End: 2022-06-22

## 2022-06-22 DIAGNOSIS — F64.0 GENDER DYSPHORIA IN ADULT: Primary | ICD-10-CM

## 2022-06-22 NOTE — TELEPHONE ENCOUNTER
Type of surgery: TLH BSO  Location of surgery: [facility]  Date and time of surgery: 8/23/2022 8:30a  Surgeon: GARRY richard/STACEY  Pre-Op Appt Date: HOSPITAL  Post-Op Appt Date: 8/30/2022 9a   Packet sent out: MAILED 6/22/2022  Pre-cert/Authorization completed:  TBD  Date: 6/22/2022 Spoke w/Lazara    COVID TEST HOME 8/21/2022 DIRECTION GIVEN    Concetta Caro  Surgery Scheduler       CPT 10364          ERAS BAG GIVEN No  ERAS INSTRUCTIONS EXPLAINED Yes    ASSIST: MASTERS    H&P TO BE COMPLETED BY:   Surgeon  FOR H&P TO BE DONE BY SURGEON   UPDATE VISIT ON DATE AT HOSPITAL  SAME DAY/OBSERVATION/INPATIENT: STRAIGHT SAME DAY  EQUIPMENT: ROUTINE  VENDOR NEEDED AT CASE: NA  IF IUD WHAT BRAND NA  LABS/SPECIAL INSTRUCTIONS: ROUTINE  TIME OFF WORK: 6 WEEKS, MAY BE LESS  ESTIMATED DOCTOR TIME NEEDED IN MINUTES 180  POST OP TO BE SCHEDULED AT 4-6 WEEKS AFTER SX APPOINTMENT LENGTH IN MINUTES 30

## 2022-06-23 NOTE — TELEPHONE ENCOUNTER
Spoke to Donya dumont [facility] to add this on as follows.    Concetta Caro  Surgery Scheduler

## 2022-06-27 ENCOUNTER — VIRTUAL VISIT (OUTPATIENT)
Dept: FAMILY MEDICINE | Facility: CLINIC | Age: 42
End: 2022-06-27
Payer: COMMERCIAL

## 2022-06-27 DIAGNOSIS — F64.0 GENDER DYSPHORIA IN ADULT: Primary | ICD-10-CM

## 2022-06-27 PROCEDURE — 99214 OFFICE O/P EST MOD 30 MIN: CPT | Mod: 95 | Performed by: FAMILY MEDICINE

## 2022-06-27 ASSESSMENT — ENCOUNTER SYMPTOMS
SHORTNESS OF BREATH: 0
UNEXPECTED WEIGHT CHANGE: 0
CHILLS: 0
FATIGUE: 0

## 2022-06-27 NOTE — PROGRESS NOTES
The patient has been notified of following:       Patient has given verbal consent for Video visit? Yes    Patient would like the video invitation sent by: [employer]    Video Start Time: 8:58 AM              ZOEY Schilling is a 42 year old individual that uses pronouns He/Him/His/Himself that presents today for follow up of:  masculinizing hormone therapy.   Alone or accompanied by: accompanied today by  Gender identity: male  Started Hormone  therapy    Continues on Other xyosted 75 mg weekly*.   Any special concerns today?    No new concerns  Pt. Will have full hysterectomy in 2022, had been having cramping with IUD    On hormones?  YES +++ Shot day of the week?       Due for labs?  Yes      +++ Refills of meds needed?  Yes  Gender related body changes since last visit: stable    Breakthrough bleeding? No:     New health concerns since last visit:  none  ---    Past Surgical History:   Procedure Laterality Date     CHOLECYSTECTOMY  age 21     DILATION AND CURETTAGE SUCTION, TREAT INCOMPLETE   10/16/2012    Procedure: DILATION AND CURETTAGE SUCTION, TREAT INCOMPLETE ;  suction dilation and curettage;  Surgeon: Wilda Whitfield MD;  Location:  GI     TRANSGENDER MASTECTOMY Bilateral 2020    Procedure: bilateral simple mastectomy,  bilateral nipple grafts, OnQ;  Surgeon: Holli Santacruz MD;  Location: [facility]       Patient Active Problem List   Diagnosis     Gender dysphoria in adult     Gender dysphoria       Current Outpatient Medications   Medication Sig Dispense Refill     acetaminophen (TYLENOL) 325 MG tablet Take 2 tablets (650 mg) by mouth every 6 hours as needed for mild pain 100 tablet 0     clindamycin (CLEOCIN T) 1 % external lotion Apply topically 2 times daily 60 mL 4     scopolamine (TRANSDERM) 1 MG/3DAYS 72 hr patch Place 1 patch onto the skin every 72 hours And place first one the the evening before surgery 2 patch 0     Testosterone Enanthate (XYOSTED) 75  "MG/0.5ML SOAJ Inject 75 mg Subcutaneous once a week 8 mL 3       History   Smoking Status     Never Smoker   Smokeless Tobacco     Never Used          Allergies   Allergen Reactions     Penicillins      Sulfa Drugs        There are no preventive care reminders to display for this patient.      Problem, Medication and Allergy Lists were reviewed and are current..         Review of Systems:   Review of Systems   Constitutional: Negative for chills, fatigue and unexpected weight change.   Respiratory: Negative for shortness of breath.    Cardiovascular: Negative for chest pain.              Labs:   Results from last visit:  Office Visit on 06/15/2022   Component Date Value Ref Range Status     Hemoglobin 06/15/2022 16.1  11.7 - 17.7 g/dL Final    Sex Specific Reference Ranges:    Female  11.7-15.7  g/dL  Male    13.3-17.7   g/dL       Testosterone in range 10/2021  Lipids normal 2/2022      EXAM:  Vitals from ob-gyne visit from 6/15/2022:   /68   Ht 1.676 m (5' 6\")   Wt 91.2 kg (201 lb)   BMI 32.44 kg/m    Body mass index is 32.44 kg/m .    Today  Constitutional: healthy, alert and no distress   Psychiatric: mentation appears normal and affect normal/bright     Assessment and Plan   1. Gender dysphoria  Stable response to current hormone dose  Counseled may or may not adjust hormone dose after hysterectomy   Labs: hgb, testosterone  To do in fall  Lipids at age 47    Follow-up 6months           Video-Visit Details    Type of service:  Video Visit    Video End Time (time video stopped): 9:20    Originating Location (pt. Location): Other outdoors    Distant Location (provider location):  [facility]     Mode of Communication:  Video Conference via video platform: Davian Chan MD        Results by [employer]  Questions were elicited and answered.     Fam Chan MD    "

## 2022-08-15 ENCOUNTER — MEDICAL CORRESPONDENCE (OUTPATIENT)
Dept: HEALTH INFORMATION MANAGEMENT | Facility: CLINIC | Age: 42
End: 2022-08-15

## 2022-08-22 ENCOUNTER — ANESTHESIA EVENT (OUTPATIENT)
Dept: SURGERY | Facility: CLINIC | Age: 42
End: 2022-08-22
Payer: COMMERCIAL

## 2022-08-23 ENCOUNTER — HOSPITAL ENCOUNTER (OUTPATIENT)
Facility: CLINIC | Age: 42
Discharge: HOME | End: 2022-08-23
Attending: OBSTETRICS & GYNECOLOGY | Admitting: OBSTETRICS & GYNECOLOGY
Payer: COMMERCIAL

## 2022-08-23 ENCOUNTER — ANESTHESIA (OUTPATIENT)
Dept: SURGERY | Facility: CLINIC | Age: 42
End: 2022-08-23
Payer: COMMERCIAL

## 2022-08-23 VITALS
OXYGEN SATURATION: 98 % | RESPIRATION RATE: 14 BRPM | HEART RATE: 60 BPM | BODY MASS INDEX: 30.97 KG/M2 | HEIGHT: 67 IN | SYSTOLIC BLOOD PRESSURE: 158 MMHG | DIASTOLIC BLOOD PRESSURE: 90 MMHG | WEIGHT: 197.3 LBS | TEMPERATURE: 97.3 F

## 2022-08-23 DIAGNOSIS — G89.18 POSTOPERATIVE PAIN: Primary | ICD-10-CM

## 2022-08-23 LAB
ABO/RH(D): NORMAL
ANTIBODY SCREEN: NEGATIVE
BASOPHILS # BLD AUTO: 0.1 10E3/UL (ref 0–0.2)
BASOPHILS NFR BLD AUTO: 1 %
EOSINOPHIL # BLD AUTO: 0.2 10E3/UL (ref 0–0.7)
EOSINOPHIL NFR BLD AUTO: 2 %
ERYTHROCYTE [DISTWIDTH] IN BLOOD BY AUTOMATED COUNT: 12.7 % (ref 10–15)
HCG SERPL QL: NEGATIVE
HCT VFR BLD AUTO: 47.6 % (ref 35–53)
HGB BLD-MCNC: 16.3 G/DL (ref 11.7–17.7)
IMM GRANULOCYTES # BLD: 0 10E3/UL
IMM GRANULOCYTES NFR BLD: 0 %
LYMPHOCYTES # BLD AUTO: 1.8 10E3/UL (ref 0.8–5.3)
LYMPHOCYTES NFR BLD AUTO: 24 %
MCH RBC QN AUTO: 29.5 PG (ref 26.5–33)
MCHC RBC AUTO-ENTMCNC: 34.2 G/DL (ref 31.5–36.5)
MCV RBC AUTO: 86 FL (ref 78–100)
MONOCYTES # BLD AUTO: 0.6 10E3/UL (ref 0–1.3)
MONOCYTES NFR BLD AUTO: 8 %
NEUTROPHILS # BLD AUTO: 5 10E3/UL (ref 1.6–8.3)
NEUTROPHILS NFR BLD AUTO: 65 %
NRBC # BLD AUTO: 0 10E3/UL
NRBC BLD AUTO-RTO: 0 /100
PLATELET # BLD AUTO: 232 10E3/UL (ref 150–450)
RBC # BLD AUTO: 5.52 10E6/UL (ref 3.8–5.9)
SPECIMEN EXPIRATION DATE: NORMAL
WBC # BLD AUTO: 7.7 10E3/UL (ref 4–11)

## 2022-08-23 PROCEDURE — 86901 BLOOD TYPING SEROLOGIC RH(D): CPT | Performed by: OBSTETRICS & GYNECOLOGY

## 2022-08-23 PROCEDURE — 250N000011 HC RX IP 250 OP 636: Performed by: OBSTETRICS & GYNECOLOGY

## 2022-08-23 PROCEDURE — 360N000077 HC SURGERY LEVEL 4, PER MIN: Performed by: OBSTETRICS & GYNECOLOGY

## 2022-08-23 PROCEDURE — 250N000013 HC RX MED GY IP 250 OP 250 PS 637: Performed by: ANESTHESIOLOGY

## 2022-08-23 PROCEDURE — 84703 CHORIONIC GONADOTROPIN ASSAY: CPT | Performed by: ANESTHESIOLOGY

## 2022-08-23 PROCEDURE — 36415 COLL VENOUS BLD VENIPUNCTURE: CPT | Performed by: OBSTETRICS & GYNECOLOGY

## 2022-08-23 PROCEDURE — 58571 TLH W/T/O 250 G OR LESS: CPT | Mod: KX | Performed by: OBSTETRICS & GYNECOLOGY

## 2022-08-23 PROCEDURE — 85025 COMPLETE CBC W/AUTO DIFF WBC: CPT | Performed by: OBSTETRICS & GYNECOLOGY

## 2022-08-23 PROCEDURE — 250N000011 HC RX IP 250 OP 636: Performed by: ANESTHESIOLOGY

## 2022-08-23 PROCEDURE — 710N000009 HC RECOVERY PHASE 1, LEVEL 1, PER MIN: Performed by: OBSTETRICS & GYNECOLOGY

## 2022-08-23 PROCEDURE — 58571 TLH W/T/O 250 G OR LESS: CPT | Mod: 80 | Performed by: OBSTETRICS & GYNECOLOGY

## 2022-08-23 PROCEDURE — 250N000009 HC RX 250: Performed by: OBSTETRICS & GYNECOLOGY

## 2022-08-23 PROCEDURE — 88307 TISSUE EXAM BY PATHOLOGIST: CPT | Mod: 26 | Performed by: PATHOLOGY

## 2022-08-23 PROCEDURE — 272N000001 HC OR GENERAL SUPPLY STERILE: Performed by: OBSTETRICS & GYNECOLOGY

## 2022-08-23 PROCEDURE — 999N000141 HC STATISTIC PRE-PROCEDURE NURSING ASSESSMENT: Performed by: OBSTETRICS & GYNECOLOGY

## 2022-08-23 PROCEDURE — 250N000009 HC RX 250: Performed by: NURSE ANESTHETIST, CERTIFIED REGISTERED

## 2022-08-23 PROCEDURE — 710N000012 HC RECOVERY PHASE 2, PER MINUTE: Performed by: OBSTETRICS & GYNECOLOGY

## 2022-08-23 PROCEDURE — 88307 TISSUE EXAM BY PATHOLOGIST: CPT | Mod: TC | Performed by: OBSTETRICS & GYNECOLOGY

## 2022-08-23 PROCEDURE — 258N000001 HC RX 258: Performed by: OBSTETRICS & GYNECOLOGY

## 2022-08-23 PROCEDURE — 250N000011 HC RX IP 250 OP 636: Performed by: NURSE ANESTHETIST, CERTIFIED REGISTERED

## 2022-08-23 PROCEDURE — 250N000013 HC RX MED GY IP 250 OP 250 PS 637: Performed by: OBSTETRICS & GYNECOLOGY

## 2022-08-23 PROCEDURE — 250N000025 HC SEVOFLURANE, PER MIN: Performed by: OBSTETRICS & GYNECOLOGY

## 2022-08-23 PROCEDURE — 370N000017 HC ANESTHESIA TECHNICAL FEE, PER MIN: Performed by: OBSTETRICS & GYNECOLOGY

## 2022-08-23 PROCEDURE — 258N000003 HC RX IP 258 OP 636: Performed by: ANESTHESIOLOGY

## 2022-08-23 PROCEDURE — 258N000003 HC RX IP 258 OP 636: Performed by: NURSE ANESTHETIST, CERTIFIED REGISTERED

## 2022-08-23 RX ORDER — EPHEDRINE SULFATE 50 MG/ML
INJECTION, SOLUTION INTRAMUSCULAR; INTRAVENOUS; SUBCUTANEOUS PRN
Status: DISCONTINUED | OUTPATIENT
Start: 2022-08-23 | End: 2022-08-23

## 2022-08-23 RX ORDER — PROPOFOL 10 MG/ML
INJECTION, EMULSION INTRAVENOUS PRN
Status: DISCONTINUED | OUTPATIENT
Start: 2022-08-23 | End: 2022-08-23

## 2022-08-23 RX ORDER — BUPIVACAINE HYDROCHLORIDE 2.5 MG/ML
INJECTION, SOLUTION INFILTRATION; PERINEURAL PRN
Status: DISCONTINUED | OUTPATIENT
Start: 2022-08-23 | End: 2022-08-23 | Stop reason: HOSPADM

## 2022-08-23 RX ORDER — ONDANSETRON 2 MG/ML
INJECTION INTRAMUSCULAR; INTRAVENOUS PRN
Status: DISCONTINUED | OUTPATIENT
Start: 2022-08-23 | End: 2022-08-23

## 2022-08-23 RX ORDER — OXYCODONE HYDROCHLORIDE 5 MG/1
5 TABLET ORAL EVERY 4 HOURS PRN
Status: DISCONTINUED | OUTPATIENT
Start: 2022-08-23 | End: 2022-08-23 | Stop reason: HOSPADM

## 2022-08-23 RX ORDER — PROPOFOL 10 MG/ML
INJECTION, EMULSION INTRAVENOUS CONTINUOUS PRN
Status: DISCONTINUED | OUTPATIENT
Start: 2022-08-23 | End: 2022-08-23

## 2022-08-23 RX ORDER — GLYCOPYRROLATE 0.2 MG/ML
INJECTION, SOLUTION INTRAMUSCULAR; INTRAVENOUS PRN
Status: DISCONTINUED | OUTPATIENT
Start: 2022-08-23 | End: 2022-08-23

## 2022-08-23 RX ORDER — HYDROMORPHONE HCL IN WATER/PF 6 MG/30 ML
0.4 PATIENT CONTROLLED ANALGESIA SYRINGE INTRAVENOUS EVERY 5 MIN PRN
Status: DISCONTINUED | OUTPATIENT
Start: 2022-08-23 | End: 2022-08-23 | Stop reason: HOSPADM

## 2022-08-23 RX ORDER — IBUPROFEN 400 MG/1
800 TABLET, FILM COATED ORAL ONCE
Status: DISCONTINUED | OUTPATIENT
Start: 2022-08-23 | End: 2022-08-23 | Stop reason: HOSPADM

## 2022-08-23 RX ORDER — MAGNESIUM HYDROXIDE 1200 MG/15ML
LIQUID ORAL PRN
Status: DISCONTINUED | OUTPATIENT
Start: 2022-08-23 | End: 2022-08-23 | Stop reason: HOSPADM

## 2022-08-23 RX ORDER — FENTANYL CITRATE 0.05 MG/ML
25 INJECTION, SOLUTION INTRAMUSCULAR; INTRAVENOUS EVERY 5 MIN PRN
Status: DISCONTINUED | OUTPATIENT
Start: 2022-08-23 | End: 2022-08-23 | Stop reason: HOSPADM

## 2022-08-23 RX ORDER — LIDOCAINE HYDROCHLORIDE 20 MG/ML
INJECTION, SOLUTION INFILTRATION; PERINEURAL PRN
Status: DISCONTINUED | OUTPATIENT
Start: 2022-08-23 | End: 2022-08-23

## 2022-08-23 RX ORDER — CEFAZOLIN SODIUM/WATER 2 G/20 ML
2 SYRINGE (ML) INTRAVENOUS
Status: COMPLETED | OUTPATIENT
Start: 2022-08-23 | End: 2022-08-23

## 2022-08-23 RX ORDER — ACETAMINOPHEN 325 MG/1
975 TABLET ORAL ONCE
Status: DISCONTINUED | OUTPATIENT
Start: 2022-08-23 | End: 2022-08-23 | Stop reason: HOSPADM

## 2022-08-23 RX ORDER — KETOROLAC TROMETHAMINE 30 MG/ML
INJECTION, SOLUTION INTRAMUSCULAR; INTRAVENOUS PRN
Status: DISCONTINUED | OUTPATIENT
Start: 2022-08-23 | End: 2022-08-23

## 2022-08-23 RX ORDER — CEFAZOLIN SODIUM/WATER 2 G/20 ML
2 SYRINGE (ML) INTRAVENOUS SEE ADMIN INSTRUCTIONS
Status: DISCONTINUED | OUTPATIENT
Start: 2022-08-23 | End: 2022-08-23 | Stop reason: HOSPADM

## 2022-08-23 RX ORDER — ONDANSETRON 4 MG/1
4 TABLET, ORALLY DISINTEGRATING ORAL EVERY 30 MIN PRN
Status: DISCONTINUED | OUTPATIENT
Start: 2022-08-23 | End: 2022-08-23 | Stop reason: HOSPADM

## 2022-08-23 RX ORDER — NEOSTIGMINE METHYLSULFATE 1 MG/ML
VIAL (ML) INJECTION PRN
Status: DISCONTINUED | OUTPATIENT
Start: 2022-08-23 | End: 2022-08-23

## 2022-08-23 RX ORDER — FENTANYL CITRATE 50 UG/ML
INJECTION, SOLUTION INTRAMUSCULAR; INTRAVENOUS PRN
Status: DISCONTINUED | OUTPATIENT
Start: 2022-08-23 | End: 2022-08-23

## 2022-08-23 RX ORDER — HYDROXYZINE HYDROCHLORIDE 25 MG/1
25 TABLET, FILM COATED ORAL ONCE
Status: COMPLETED | OUTPATIENT
Start: 2022-08-23 | End: 2022-08-23

## 2022-08-23 RX ORDER — ONDANSETRON 8 MG/1
8 TABLET, ORALLY DISINTEGRATING ORAL EVERY 8 HOURS PRN
Qty: 5 TABLET | Refills: 0 | Status: SHIPPED | OUTPATIENT
Start: 2022-08-23 | End: 2022-09-30

## 2022-08-23 RX ORDER — HYDROMORPHONE HYDROCHLORIDE 1 MG/ML
INJECTION, SOLUTION INTRAMUSCULAR; INTRAVENOUS; SUBCUTANEOUS PRN
Status: DISCONTINUED | OUTPATIENT
Start: 2022-08-23 | End: 2022-08-23

## 2022-08-23 RX ORDER — PHENAZOPYRIDINE HYDROCHLORIDE 200 MG/1
200 TABLET, FILM COATED ORAL ONCE
Status: COMPLETED | OUTPATIENT
Start: 2022-08-23 | End: 2022-08-23

## 2022-08-23 RX ORDER — FENTANYL CITRATE 0.05 MG/ML
50 INJECTION, SOLUTION INTRAMUSCULAR; INTRAVENOUS
Status: DISCONTINUED | OUTPATIENT
Start: 2022-08-23 | End: 2022-08-23 | Stop reason: HOSPADM

## 2022-08-23 RX ORDER — OXYCODONE HYDROCHLORIDE 5 MG/1
5 TABLET ORAL
Status: COMPLETED | OUTPATIENT
Start: 2022-08-23 | End: 2022-08-23

## 2022-08-23 RX ORDER — SODIUM CHLORIDE, SODIUM LACTATE, POTASSIUM CHLORIDE, CALCIUM CHLORIDE 600; 310; 30; 20 MG/100ML; MG/100ML; MG/100ML; MG/100ML
INJECTION, SOLUTION INTRAVENOUS CONTINUOUS
Status: DISCONTINUED | OUTPATIENT
Start: 2022-08-23 | End: 2022-08-23 | Stop reason: HOSPADM

## 2022-08-23 RX ORDER — OXYCODONE HYDROCHLORIDE 5 MG/1
5-10 TABLET ORAL EVERY 4 HOURS PRN
Qty: 12 TABLET | Refills: 0 | Status: SHIPPED | OUTPATIENT
Start: 2022-08-23 | End: 2022-09-30

## 2022-08-23 RX ORDER — BUPIVACAINE HYDROCHLORIDE 2.5 MG/ML
INJECTION, SOLUTION EPIDURAL; INFILTRATION; INTRACAUDAL
Status: DISCONTINUED
Start: 2022-08-23 | End: 2022-08-23 | Stop reason: HOSPADM

## 2022-08-23 RX ORDER — DEXAMETHASONE SODIUM PHOSPHATE 4 MG/ML
INJECTION, SOLUTION INTRA-ARTICULAR; INTRALESIONAL; INTRAMUSCULAR; INTRAVENOUS; SOFT TISSUE PRN
Status: DISCONTINUED | OUTPATIENT
Start: 2022-08-23 | End: 2022-08-23

## 2022-08-23 RX ORDER — IBUPROFEN 800 MG/1
800 TABLET, FILM COATED ORAL EVERY 6 HOURS PRN
Qty: 30 TABLET | Refills: 0 | Status: SHIPPED | OUTPATIENT
Start: 2022-08-23 | End: 2022-09-30

## 2022-08-23 RX ORDER — ONDANSETRON 2 MG/ML
4 INJECTION INTRAMUSCULAR; INTRAVENOUS EVERY 30 MIN PRN
Status: DISCONTINUED | OUTPATIENT
Start: 2022-08-23 | End: 2022-08-23 | Stop reason: HOSPADM

## 2022-08-23 RX ORDER — ACETAMINOPHEN 325 MG/1
975 TABLET ORAL ONCE
Status: COMPLETED | OUTPATIENT
Start: 2022-08-23 | End: 2022-08-23

## 2022-08-23 RX ADMIN — SODIUM CHLORIDE, POTASSIUM CHLORIDE, SODIUM LACTATE AND CALCIUM CHLORIDE: 600; 310; 30; 20 INJECTION, SOLUTION INTRAVENOUS at 09:30

## 2022-08-23 RX ADMIN — HYDROMORPHONE HYDROCHLORIDE 0.5 MG: 1 INJECTION, SOLUTION INTRAMUSCULAR; INTRAVENOUS; SUBCUTANEOUS at 10:29

## 2022-08-23 RX ADMIN — MIDAZOLAM 2 MG: 1 INJECTION INTRAMUSCULAR; INTRAVENOUS at 09:31

## 2022-08-23 RX ADMIN — GLYCOPYRROLATE 0.8 MG: 0.2 INJECTION, SOLUTION INTRAMUSCULAR; INTRAVENOUS at 12:18

## 2022-08-23 RX ADMIN — Medication 5 MG: at 10:20

## 2022-08-23 RX ADMIN — PROPOFOL 30 MCG/KG/MIN: 10 INJECTION, EMULSION INTRAVENOUS at 09:39

## 2022-08-23 RX ADMIN — PROPOFOL 200 MG: 10 INJECTION, EMULSION INTRAVENOUS at 09:34

## 2022-08-23 RX ADMIN — NEOSTIGMINE METHYLSULFATE 5 MG: 1 INJECTION, SOLUTION INTRAVENOUS at 12:18

## 2022-08-23 RX ADMIN — KETOROLAC TROMETHAMINE 30 MG: 30 INJECTION, SOLUTION INTRAMUSCULAR at 12:19

## 2022-08-23 RX ADMIN — OXYCODONE HYDROCHLORIDE 5 MG: 5 TABLET ORAL at 13:52

## 2022-08-23 RX ADMIN — DEXAMETHASONE SODIUM PHOSPHATE 10 MG: 4 INJECTION, SOLUTION INTRA-ARTICULAR; INTRALESIONAL; INTRAMUSCULAR; INTRAVENOUS; SOFT TISSUE at 09:48

## 2022-08-23 RX ADMIN — FENTANYL CITRATE 25 MCG: 50 INJECTION, SOLUTION INTRAMUSCULAR; INTRAVENOUS at 13:51

## 2022-08-23 RX ADMIN — ROCURONIUM BROMIDE 10 MG: 50 INJECTION, SOLUTION INTRAVENOUS at 10:30

## 2022-08-23 RX ADMIN — HYDROXYZINE HYDROCHLORIDE 25 MG: 25 TABLET ORAL at 14:28

## 2022-08-23 RX ADMIN — LIDOCAINE HYDROCHLORIDE 100 MG: 20 INJECTION, SOLUTION INFILTRATION; PERINEURAL at 09:34

## 2022-08-23 RX ADMIN — ONDANSETRON 4 MG: 2 INJECTION INTRAMUSCULAR; INTRAVENOUS at 12:13

## 2022-08-23 RX ADMIN — FENTANYL CITRATE 50 MCG: 50 INJECTION, SOLUTION INTRAMUSCULAR; INTRAVENOUS at 09:34

## 2022-08-23 RX ADMIN — ACETAMINOPHEN 975 MG: 325 TABLET ORAL at 06:48

## 2022-08-23 RX ADMIN — FENTANYL CITRATE 25 MCG: 50 INJECTION, SOLUTION INTRAMUSCULAR; INTRAVENOUS at 13:40

## 2022-08-23 RX ADMIN — Medication 2 G: at 09:48

## 2022-08-23 RX ADMIN — PHENYLEPHRINE HYDROCHLORIDE 100 MCG: 10 INJECTION INTRAVENOUS at 10:06

## 2022-08-23 RX ADMIN — FENTANYL CITRATE 50 MCG: 50 INJECTION, SOLUTION INTRAMUSCULAR; INTRAVENOUS at 09:59

## 2022-08-23 RX ADMIN — ROCURONIUM BROMIDE 50 MG: 50 INJECTION, SOLUTION INTRAVENOUS at 09:35

## 2022-08-23 RX ADMIN — ROCURONIUM BROMIDE 10 MG: 50 INJECTION, SOLUTION INTRAVENOUS at 10:54

## 2022-08-23 RX ADMIN — PHENAZOPYRIDINE HYDROCHLORIDE 200 MG: 200 TABLET ORAL at 06:48

## 2022-08-23 RX ADMIN — SODIUM CHLORIDE, POTASSIUM CHLORIDE, SODIUM LACTATE AND CALCIUM CHLORIDE: 600; 310; 30; 20 INJECTION, SOLUTION INTRAVENOUS at 10:32

## 2022-08-23 RX ADMIN — FENTANYL CITRATE 50 MCG: 50 INJECTION, SOLUTION INTRAMUSCULAR; INTRAVENOUS at 12:21

## 2022-08-23 ASSESSMENT — ACTIVITIES OF DAILY LIVING (ADL)
ADLS_ACUITY_SCORE: 37

## 2022-08-23 NOTE — ANESTHESIA CARE TRANSFER NOTE
Patient: Shakir Dorsey    Procedure: Procedure(s):  TOTAL LAPAROSCOPIC HYSTERECTOMY  WITH BILATERAL SALPINGO-OOPHORECTOMY  CYSTOSCOPY       Diagnosis: Female-to-male transgender person [Z78.9]  Dysmenorrhea [N94.6]  Diagnosis Additional Information: No value filed.    Anesthesia Type:   General     Note:    Oropharynx: oropharynx clear of all foreign objects  Level of Consciousness: awake  Oxygen Supplementation: face mask  Level of Supplemental Oxygen (L/min / FiO2): 6  Independent Airway: airway patency satisfactory and stable  Dentition: dentition unchanged  Vital Signs Stable: post-procedure vital signs reviewed and stable  Report to RN Given: handoff report given  Patient transferred to: PACU    Handoff Report: Identifed the Patient, Identified the Reponsible Provider, Reviewed the pertinent medical history, Discussed the surgical course, Reviewed Intra-OP anesthesia mangement and issues during anesthesia, Set expectations for post-procedure period and Allowed opportunity for questions and acknowledgement of understanding      Vitals:  Vitals Value Taken Time   /64 08/23/22 1245   Temp     Pulse 64 08/23/22 1246   Resp 14 08/23/22 1246   SpO2 100 % 08/23/22 1246   Vitals shown include unvalidated device data.    Electronically Signed By: ARTUR Parnell CRNA  August 23, 2022  12:47 PM

## 2022-08-23 NOTE — ANESTHESIA POSTPROCEDURE EVALUATION
Patient: Shakir Dorsey    Procedure: Procedure(s):  TOTAL LAPAROSCOPIC HYSTERECTOMY  WITH BILATERAL SALPINGO-OOPHORECTOMY  CYSTOSCOPY       Anesthesia Type:  General    Note:  Disposition: Outpatient   Postop Pain Control: Uneventful            Sign Out: Well controlled pain   PONV: No   Neuro/Psych: Uneventful            Sign Out: Acceptable/Baseline neuro status   Airway/Respiratory: Uneventful            Sign Out: Acceptable/Baseline resp. status   CV/Hemodynamics: Uneventful            Sign Out: Acceptable CV status; No obvious hypovolemia; No obvious fluid overload   Other NRE: NONE   DID A NON-ROUTINE EVENT OCCUR? No           Last vitals:  Vitals Value Taken Time   /79 08/23/22 1415   Temp 36.6  C (97.8  F) 08/23/22 1415   Pulse 68 08/23/22 1358   Resp 10 08/23/22 1415   SpO2 91 % 08/23/22 1358   Vitals shown include unvalidated device data.    Electronically Signed By: Juan Doss MD  August 23, 2022  2:37 PM

## 2022-08-23 NOTE — H&P
[facility]    History and Physical  Obstetrics and Gynecology     Date of Admission:  2022      History of Present Illness   Shakir Dorsey is a 42 year old adult  here for TLH/BSO/Cysto for pelvic pain with mirena IUD placed for amenorrhea purposes with testosterone use for transitioning. IUD removed 2 months ago and persistent pelvic pain and cramping and surgery planned as above.      Recent Labs   Lab Test 22  0701   AS Negative     Rhogam not indicated   No lab results found.      Prior to Admission Medications   Prior to Admission Medications   Prescriptions Last Dose Informant Patient Reported? Taking?   Testosterone Enanthate (XYOSTED) 75 MG/0.5ML SOAJ 2022  No No   Sig: Inject 75 mg Subcutaneous once a week   clindamycin (CLEOCIN T) 1 % external lotion  at PRN  No No   Sig: Apply topically 2 times daily   scopolamine (TRANSDERM) 1 MG/3DAYS 72 hr patch 2022 at 1400  No Yes   Sig: Place 1 patch onto the skin every 72 hours And place first one the the evening before surgery      Facility-Administered Medications: None     Allergies   Allergies   Allergen Reactions     Penicillins      Sulfa Drugs          Immunization History   Immunization History   Administered Date(s) Administered     COVID-19,PF,Pfizer (12+ Yrs) 2021, 2021, 2021     Flu, Unspecified 10/07/2015     Influenza (IIV3) PF 10/10/2020     Influenza Vaccine IM > 6 months Valent IIV4 (Alfuria,Fluzone) 10/21/2019     Influenza Vaccine, 6+MO IM (QUADRIVALENT W/PRESERVATIVES) 2017, 2018     TDAP Vaccine (Adacel) 05/15/2011       Past Medical History:   Diagnosis Date     Anemia     PPH with last pregnancy     Female-to-male transgender person      Gender dysphoria in adult 2019     PONV (postoperative nausea and vomiting)      Presence of of 52 mg levonorgestrel-releasing intrauterine device (IUD) 2019    placed by Roseann to attempt amenorrhea  until hyst can be done       Past Surgical History:   Procedure Laterality Date     CHOLECYSTECTOMY  age 21     DILATION AND CURETTAGE SUCTION, TREAT INCOMPLETE   10/16/2012    Procedure: DILATION AND CURETTAGE SUCTION, TREAT INCOMPLETE ;  suction dilation and curettage;  Surgeon: Wilda Whitfield MD;  Location:  GI     TRANSGENDER MASTECTOMY Bilateral 2020    Procedure: bilateral simple mastectomy,  bilateral nipple grafts, OnQ;  Surgeon: Holli Santacruz MD;  Location: [facility]        vitals from today:  /54, 52, 16, 98%, afebrile, covid neg     Abdomen: soft, NT, ND, roxanna scars well healed  Constitutional: healthy, alert, active and no distress   Extremities: NT, no edema  Neurologic: Awake, alert, oriented x3  Neuropsychiatric: General: normal, calm and normal eye contact  Heart: Regular rate and rhythm  Lungs: clear to ausculation bilaterally    A/P 42 trans man with pelvic pain persistent through almost 2 yrs of mirena IUD and s/p removal two months ago. On testosterone s/p mastectomy in past.     Planned TLH, BSO and cysto after discussion of r/b/a and recovery course.  Ok to proceed with planned moderate risk surgery with low risk patient.    Wilda Whitfield MD

## 2022-08-23 NOTE — DISCHARGE INSTRUCTIONS
Today you were given 975 mg of Tylenol at 0650. The recommended daily maximum dose is 4000 mg.      Today you received Toradol, an antiinflammatory medication similar to Ibuprofen.  You should not take other antiinflammatory medication, such as Ibuprofen, Motrin, Advil, Aleve, Naprosyn, etc until 6:19pm.    While you were at the hospital today you were given a medication called Pyridium.  This is used to treat pain, burning, increased urination, and increased urge to urinate.  Pyridium will most likely darken the color of your urine to an orange or red color. Darkened urine may also cause stains to your underwear, which may or may not be removed by laundering.     Information for Patients Discharging with a Transderm Scopolamine Patch     Dry mouth is a common side effect.  Drowsiness is another common side effect especially when combined with pain medication.  Please avoid activities that require mental alertness such as driving a car or making important legal decisions.  Since Scopolamine can cause temporary dilation of the pupils and blurred vision if it comes in contact with the eyes; be sure to wash your hands thoroughly with soap and water immediately after handling the patch.   When you remove your patch, please stick it to a tissue or paper towel for disposal.    Remove the patch immediately and contact a physician in the unlikely event that you experience symptoms of acute glaucoma (pain and reddening of the eyes, accompanied by dilated pupils).  Remove the patch if you develop any difficulties urinating.  If you cannot urinate after removing your patch, please notify your surgeon.  Remove the patch 24 hours after surgery.       **If you have questions or concerns about your procedure,  call Dr. Whitfield at [phone]**

## 2022-08-23 NOTE — OR NURSING
Patient brought a picture of home covid antigen test on phone as directed.  I personally saw this result and it was time stamped 8/21/22.  Result was negative.

## 2022-08-23 NOTE — OP NOTE
OPERATIVE REPORT     DATE OF PROCEDURE: 22  STAFF SURGEON: Wilda Whitfield MD  PREOPERATIVE DIAGNOSIS:pelvic pain, dysmenorrhea, trans female to male  POSTOPERATIVE DIAGNOSIS: Same   PROCEDURE: Total laparoscopic hysterectomy,  bilateral salpingoopherectomy,  cystoscopy  ASSISTANT SURGEON: Ursula Vigil DO. Necessary to have another physician assistant due to complexity of the case along with testosterone preoperative usage leading to potential variations in anatomy and planes. Assistant needed to help with retraction, adequate visualization and for added safety of the procedure  ANESTHESIA: General endotracheal anesthesia.  COMPLICATIONS: none  EBL: 100cc  SPECIMENS: uterus, and bilateral fallopian tubes and ovaries  FINDINGS:normal uterus, cervix, bilaterally small ovaries and normal fallopian tubes.  Normal liver edge. Normal cysto after hysterectomy with bilaterally patent UOs  INDICATIONS: Schilling is a 41 yo  now trans female to male that is on testosterone therapy currently. mirena IUD was placed  to attempt to get completely amenorrhea due to the above until decision could be made regarding hysterectomy or any bottom surgery. However over 18 months with the IUD, though eventually amenorrhea was achieved, there was persistent cramping and constant dull pain. This pain significantly worsened with any sexual activity and was becoming debilitating at least during intimacy and a constant at all other times.  Patient was counseled on the risks, benefits, and alternatives of the procedure, and informed consent was signed.   DESCRIPTION OF PROCEDURE:  The patient was taken to the operating room where general endotracheal anesthesia was induced without difficulty and found to be adequate. She was prepped and draped in the normal sterile fashion in dorsal lithotomy position. Bivalved speculum was placed in the vagina to visualize the cervix. The uterus was then sounded to 9cm and no dilation was needed.  stay sutures of 0 vicryl on a UR6 were then placed at 10 and 2 oclock. The large V-care was then chosen and the intrauterine portion placed through the cervix. The Balloon was then inflated with 3cc of air. The green cervical cup was advanced up against the cervix with the stay sutures fed through the holes in the cup.  It was confirmed to be seated securely around the cervix and then the blue vaginal occluder was advanced up again the cervical cup. Once approximation was confirmed the vaginal occluder was fixed in to place.  Syed catheter was placed for urinary drainage.      Attention was turned to the abdomen. 0.25% Marcaine was injected infraumbilically, and a 5 mm infraumbilical skin incision was made with the scalpel. Under direct visualization the laparascope was placed through the incision on a 5mm trocar and advanced into the peritoneum. The trocar was then removed from the sheath and the laparascope replaced and the abdomen was then insufflated with carbon dioxide gas.  Patient was placed in trendelenburg position. Lateral ports were then placed; one port in the left lower quadrant that was 5 mm and one port in the right lower quadrant that was also 5 mm. the umbilical trocar was swapped to an 11 trocar just prior to the RLQ port being placed. No underlying damage to abdominal or pelvic organs was noted after placement of the trocars. The abdomen and pelvis were surveyed,  anatomy noted as above and pictures were taken though at the end of the case realized the camera capture had not worked to document the anatomy. The Ligasure was then used to clamp, coagulate, and cut the left infundibulopelvic ligament and then traveling along the left mesosalpinx, the round ligament and the anterior and posterior leaves of broad ligaments. The anterior leaf was then dissected, and bladder flap was developed anteriorly to just past the midline. The posterior leaf of broad ligament was taken down, and the uterine artery  was dissected out on this side. It was clamped, coagulated, and cut again with the ligasure. The right infundibulopelvic ligament was then coagulated and sealed and transected with the ligasure and in a similar fashion as on the left the fallopian tube, round ligament, anterior and posterior broad ligament were sealed, coagulated and cut. The right uterine artery was identified and was also sealed and cut. The bladder was then further resected off of the underlying cervical tissue until a clear plane was found.  Using the ligasure hook extender the cervix was then transected from the vaginal tissue using the notch in the green cup as a guide.  Once the cervix was circumferentially transected from the vaginal tissue attention was returned to the vagina.    The vaginal blue occluder was then loosened and retracted out of the vagina and then the cervical green cup was retracted. Using the stay sutures as traction on the V-care we removed the cervix and uterus and attached tubes and ovaries in one. This was then handed off for pathology and a glove with a sponge in it was used to occlude the vagina and reestablish pneumoperitoneum.   Attention was then returned to the abdomen where using the endostitch with a 0 v-lock suture, The vaginal cuff was then closed with a running stitch. Initially started at the right corner but there was an area of bleeding at the anterior left cuff that cautery wasn't controlling so anchor stitch in the right corner was removed and placed across the bleeding area just medial to the left corner, hemostasis was obtained, and then back stitch to the left corner was done. The remainder of the Vaginal cuff was well closed from the left to the right. Some cautery was done and  and excellent hemostasis was noted. Following the procedure, the pelvis was irrigated and suctioned, and surgicel powder applied for additional hemostasis.     At this point using the donna singh the infraumbilical trocar  was removed and the fascia at this location close with an o vicyrl. Additional carbon dioxide gas was allowed to escape the other two sites and then those trocars were removed. The skin at all three sites was then closed with a 4-0 monocryl.     Attention was simultaneously turned to cystoscopy. The weinstein was removed and cystoscope placed and the bladder was noted fully intact. The right UO was immediately noted to be peristalsing and patent. The left took slightly longer and bladder was filled with saline and emptied and then good jet of urine seen from left. Bladder was fully emptied by cysto and vaginal cuff was found to be hemostatic and procedure was completed.     All instruments and glove with lap sponges were removed from the vagina.  All counts were correct x2. The patient was cleaned off, returned to the supine position, awoken from anesthesia, and taken to Recovery in a good condition.     Wilda Whitfield MD

## 2022-08-23 NOTE — ANESTHESIA PREPROCEDURE EVALUATION
Anesthesia Pre-Procedure Evaluation    Patient: Shakir Dorsey   MRN: [id number] : 1980        Procedure : Procedure(s):  TOTAL LAPAROSCOPIC HYSTERECTOMY  WITH BILATERAL SALPINGO-OOPHORECTOMY  CYSTOSCOPY          Past Medical History:   Diagnosis Date     Anemia     PPH with last pregnancy     Female-to-male transgender person      Gender dysphoria in adult 2019     PONV (postoperative nausea and vomiting)      Presence of of 52 mg levonorgestrel-releasing intrauterine device (IUD) 2019    placed by Roseann to attempt amenorrhea until hyst can be done      Past Surgical History:   Procedure Laterality Date     CHOLECYSTECTOMY  age 21     DILATION AND CURETTAGE SUCTION, TREAT INCOMPLETE   10/16/2012    Procedure: DILATION AND CURETTAGE SUCTION, TREAT INCOMPLETE ;  suction dilation and curettage;  Surgeon: Wilda Whitfield MD;  Location: [facility]     TRANSGENDER MASTECTOMY Bilateral 2020    Procedure: bilateral simple mastectomy,  bilateral nipple grafts, OnQ;  Surgeon: Holli Santacruz MD;  Location: [facility]      Allergies   Allergen Reactions     Penicillins      Sulfa Drugs       Social History     Tobacco Use     Smoking status: Never Smoker     Smokeless tobacco: Never Used   Substance Use Topics     Alcohol use: Yes     Comment: Rare       Wt Readings from Last 1 Encounters:   22 89.5 kg (197 lb 4.8 oz)        Anesthesia Evaluation   Pt has had prior anesthetic.     History of anesthetic complications (everytime per patient)  - PONV.      ROS/MED HX  ENT/Pulmonary:    (-) sleep apnea   Neurologic:       Cardiovascular:       METS/Exercise Tolerance:     Hematologic:       Musculoskeletal:       GI/Hepatic:    (-) GERD   Renal/Genitourinary: Comment: Female to male transition      Endo:     (+) Obesity (BMI 32),     Psychiatric/Substance Use:       Infectious Disease:       Malignancy:       Other:            Physical Exam    Airway        Mallampati: II   TM  distance: > 3 FB   Neck ROM: full   Mouth opening: > 3 cm    Respiratory Devices and Support         Dental  no notable dental history         Cardiovascular   cardiovascular exam normal          Pulmonary   pulmonary exam normal                OUTSIDE LABS:  CBC:   Lab Results   Component Value Date    WBC 9.6 03/06/2020    WBC 7.1 12/17/2019    HGB 16.1 06/15/2022    HGB 15.6 02/24/2021    HCT 40.5 03/06/2020    HCT 39.0 12/17/2019     03/06/2020     12/17/2019     BMP:   Lab Results   Component Value Date     12/17/2019     01/29/2019    POTASSIUM 4.1 12/17/2019    POTASSIUM 4.7 01/29/2019    CHLORIDE 106 12/17/2019    CHLORIDE 102 01/29/2019    CO2 28 12/17/2019    CO2 22 01/29/2019    BUN 15 12/17/2019    BUN 19 01/29/2019    CR 0.77 12/17/2019    CR 0.92 01/29/2019    GLC 86 07/08/2020    GLC 92 12/17/2019     COAGS:   Lab Results   Component Value Date    PTT 30 03/20/2011    INR 1.01 03/20/2011    FIBR 630 (H) 03/20/2011     POC:   Lab Results   Component Value Date    HCG Negative 12/27/2019     HEPATIC:   Lab Results   Component Value Date    ALBUMIN 3.8 12/17/2019    PROTTOTAL 7.3 12/17/2019    ALT 30 12/17/2019    AST 18 12/17/2019    ALKPHOS 53 12/17/2019    BILITOTAL 0.6 12/17/2019     OTHER:   Lab Results   Component Value Date    A1C 5.3 12/17/2019    KERRY 8.9 12/17/2019    TSH 1.66 12/17/2019    SED 13 09/02/2008       Anesthesia Plan    ASA Status:  2   NPO Status:  NPO Appropriate    Anesthesia Type: General.     - Airway: ETT   Induction: Intravenous.   Maintenance: Balanced.        Consents    Anesthesia Plan(s) and associated risks, benefits, and realistic alternatives discussed. Questions answered and patient/representative(s) expressed understanding.    - Discussed:     - Discussed with:  Patient         Postoperative Care    Pain management: IV analgesics, Multi-modal analgesia.   PONV prophylaxis: Ondansetron (or other 5HT-3), Dexamethasone or Solumedrol, Background  Propofol Infusion, Scopolamine patch     Comments:    Other Comments: H&P reviewed    Surgeon ordered scop patch  Prop gtt, zofran, decadron 10mg  Toradol 30mg if ok by surgeon            Juan Doss MD

## 2022-08-23 NOTE — BRIEF OP NOTE
[facility]    Brief Operative Note    Pre-operative diagnosis: Pelvic pain  dysmenorrhea  Female-to-male transgender person [Z78.9]  ]  Post-operative diagnosis Same as pre-operative diagnosis    Procedure: Procedure(s):  TOTAL LAPAROSCOPIC HYSTERECTOMY  WITH BILATERAL SALPINGO-OOPHORECTOMY  CYSTOSCOPY  Surgeon: Surgeon(s) and Role:     * Wilda Whitfield MD - Primary     * Masters, Ursula Pollard DO - Assisting  Anesthesia: General   Estimated Blood Loss: Less than 100 ml    Drains: None  Specimens:   ID Type Source Tests Collected by Time Destination   1 : uterus, cervix, bilateral fallopian tubes and bilateral ovaries Tissue Uterus, Cervix, Bilateral Fallopian Tubes & Ovaries SURGICAL PATHOLOGY EXAM Wilda Whitfield MD 8/23/2022 10:32 AM      Findings:   normal uterus and cervix. normal small ovaries bilaterally as well as normal fallopian tubes. normal liver and bowel. no adhesions.  Complications: None.  Implants: none

## 2022-08-23 NOTE — ANESTHESIA PROCEDURE NOTES
Airway       Patient location during procedure: OR       Procedure Start/Stop Times: 8/23/2022 9:37 AM  Staff -        Anesthesiologist:  Juan Doss MD       CRNA: Leonie Samuel APRN CRNA       Performed By: CRNA  Consent for Airway        Urgency: elective  Indications and Patient Condition       Indications for airway management: bailey-procedural       Induction type:intravenous       Mask difficulty assessment: 1 - vent by mask    Final Airway Details       Final airway type: endotracheal airway       Successful airway: ETT - single  Endotracheal Airway Details        ETT size (mm): 7.0       Cuffed: yes       Successful intubation technique: direct laryngoscopy       DL Blade Type: Schwartz 2       Grade View of Cords: 1       Adjucts: stylet       Position: Right       Measured from: gums/teeth       Secured at (cm): 21       Bite block used: None    Post intubation assessment        Placement verified by: capnometry, equal breath sounds and chest rise        Number of attempts at approach: 1       Number of other approaches attempted: 0       Secured with: silk tape       Ease of procedure: easy       Dentition: Unchanged    Medication(s) Administered   Medication Administration Time: 8/23/2022 9:37 AM

## 2022-08-25 LAB
PATH REPORT.COMMENTS IMP SPEC: NORMAL
PATH REPORT.COMMENTS IMP SPEC: NORMAL
PATH REPORT.FINAL DX SPEC: NORMAL
PATH REPORT.GROSS SPEC: NORMAL
PATH REPORT.MICROSCOPIC SPEC OTHER STN: NORMAL
PATH REPORT.RELEVANT HX SPEC: NORMAL
PHOTO IMAGE: NORMAL

## 2022-08-26 NOTE — RESULT ENCOUNTER NOTE
Shakir,    I hope you're doing well and recovering ok!!    Everything went great with your surgery. I tried to call Juana but it went to  so I left her a message at least but unfortunately wasn't able to give any specifics. However there was nothing unusual to report and everything went smoothly.    Your pathology is back and everything looks totally normal and benign and appropriate changes to testosterone, so that's great!    Hopefully all is as well as expected, call of course if anything comes up before 9/30, otherwise I'll see you then!    Wilda Whitfield MD

## 2022-09-29 NOTE — PROGRESS NOTES
SUBJECTIVE:                                                   Shakir Dorsey is a 42 year old adult who presents to clinic today for the following health issue(s):  Patient presents with:  Post-op Visit  Imm/Inj: Flu Shot      Additional information: Procedure: TOTAL LAPAROSCOPIC HYSTERECTOMY  WITH BILATERAL SALPINGO-OOPHORECTOMY;  Surgeon: Wilda Whitfield MD;  Location:  OR    HPI:    Patient is being seen today for a postop check s/p total laparoscopic hysterectomy with bilateral salpingo-oophorectomy 5 weeks ago. Recovery went well. First day after surgery had a lot of gas pain and more noticeable pain in the hips. Was bloated, stomach was distended and had some constipation too.   wearing underwear just now for the first time b/c jeans and underwear still felt a bit tight and uncomfortable but not nearly as  much stomach pressure now and constipation has resolved    Had some red bleeding immediately surgery in the PACU, though light and absolutely none since.     Been able to go back to work, took a lot of breaks because felt tired a lot. Has been able to exercise now though and do longer rides on the [employer] and softball, etc,  Supposed to golf this weekend in [place] for work and hoping to do that.    No vasomotor sx at all with the testosterone and feels like since BSO there's been even more voice deepening which is great.  So happy to have no more cramping and chronic uterine pain for the year that the IUD was in.     No LMP recorded. (Menstrual status: IUD)..     Patient not currently sexually active due to recent surgery, .  Using hysterectomy for contraception.    reports that he has never smoked. He has never used smokeless tobacco.    STD testing offered?  deferred     Health maintenance updated:  yes    Today's PHQ-2 Score:   PHQ-2 (  [employer]) 2021   Q1: Little interest or pleasure in doing things 0   Q2: Feeling down, depressed or hopeless 0   PHQ-2 Score 0   PHQ-2 Total  Score (12-17 Years)- Positive if 3 or more points; Administer PHQ-A if positive -   Q1: Little interest or pleasure in doing things -   Q2: Feeling down, depressed or hopeless -   PHQ-2 Score -     Today's PHQ-9 Score:   PHQ-9 SCORE 2020   PHQ-9 Total Score 3     Today's WENDI-7 Score:   WENDI-7 SCORE 2020   Total Score 3       Problem list and histories reviewed & adjusted, as indicated.  Additional history: as documented.    Patient Active Problem List   Diagnosis     Gender dysphoria in adult     Gender dysphoria     S/P hysterectomy with oophorectomy     Female-to-male transgender person     Past Surgical History:   Procedure Laterality Date     CHOLECYSTECTOMY  age 21     CYSTOSCOPY N/A 2022    Procedure: CYSTOSCOPY;  Surgeon: Wilda Whitfield MD;  Location:  OR     DILATION AND CURETTAGE SUCTION, TREAT INCOMPLETE   10/16/2012    Procedure: DILATION AND CURETTAGE SUCTION, TREAT INCOMPLETE ;  suction dilation and curettage;  Surgeon: Wilda Whitfield MD;  Location:  GI     LAPAROSCOPIC HYSTERECTOMY TOTAL, BILATERAL SALPINGO-OOPHORECTOMY, COMBINED Bilateral 2022    Procedure: TOTAL LAPAROSCOPIC HYSTERECTOMY  WITH BILATERAL SALPINGO-OOPHORECTOMY;  Surgeon: Wilda Whitfield MD;  Location:  OR     TRANSGENDER MASTECTOMY Bilateral 2020    Procedure: bilateral simple mastectomy,  bilateral nipple grafts, OnQ;  Surgeon: Holli Santacruz MD;  Location:  OR      Social History     Tobacco Use     Smoking status: Never Smoker     Smokeless tobacco: Never Used   Substance Use Topics     Alcohol use: Yes     Comment: Rare       *Patient is Adopted       Problem (# of Occurrences) Relation (Name,Age of Onset)    Unknown/Adopted (2) Mother, Father            Current Outpatient Medications   Medication Sig     clindamycin (CLEOCIN T) 1 % external lotion Apply topically 2 times daily     clindamycin (CLEOCIN) 300 MG capsule TAKE 1 CAPSULE BY MOUTH EVERY 6 HOURS UNTIL GONE.      "Testosterone Enanthate (XYOSTED) 75 MG/0.5ML SOAJ Inject 75 mg Subcutaneous once a week     No current facility-administered medications for this visit.     Allergies   Allergen Reactions     Penicillins      Sulfa Drugs        ROS:  12 point review of systems negative other than symptoms noted below or in the HPI.  No urinary frequency or dysuria, bladder or kidney problems      OBJECTIVE:     /79   Ht 1.689 m (5' 6.5\")   Wt 89.4 kg (197 lb)   BMI 31.32 kg/m    Body mass index is 31.32 kg/m .    Exam:  Constitutional:  Appearance: Well nourished, well developed alert, in no acute distress  Gastrointestinal:  Abdominal Examination:  Abdomen nontender to palpation, tone normal without rigidity or guarding, no masses present, umbilicus without lesions; Liver/Spleen:  No hepatomegaly present, liver nontender to palpation; Hernias:  No hernias present  Pelvic Exam:  External Genitalia:     Normal appearance for age, no discharge present, no tenderness present, no inflammatory lesions present, color normal  Vagina:     Normal vaginal vault without central or paravaginal defects, no discharge present, no inflammatory lesions present, no masses present, PARTIAL SMALL LINE OF SUTURE IS STILL SEEN AT THE CUFF BUT VERY WELL HEALED. NOT REACHABLE ON BIMANUAL EXAM WITH WELL SUPPORTED VAULT  Bladder:     Nontender to palpation  Urethra:   Urethral Body:  Urethra palpation normal, urethra structural support normal   Urethral Meatus:  No erythema or lesions present  Cervix:     Surgically absent  Uterus:     Surgically absent  Adnexa:     Surgically absent  Perineum:     Perineum within normal limits, no evidence of trauma, no rashes or skin lesions present  Anus:     Anus within normal limits, no hemorrhoids present  Inguinal Lymph Nodes:     No lymphadenopathy present     In-Clinic Test Results:  No results found for this or any previous visit (from the past 24 hour(s)).    ASSESSMENT/PLAN:                               "                          ICD-10-CM    1. Postop check  Z09    2. S/P hysterectomy with oophorectomy  Z90.710     Z90.721    3. Female-to-male transgender person  Z78.9    4. Need for prophylactic vaccination and inoculation against influenza  Z23 INFLUENZA VACCINE IM > 6 MONTHS VALENT IIV4 (AFLURIA/FLUZONE)         Discussed suture and stitching which can be cause for bleeding when absorbs. However with this type of suture do not typically see that.  Ok to go back to any exercise he'd like and feel comfortable doing including golfing this weekend.  would refrain from penetrative sex for another 2-3 weeks to allow sutures to heal completely.   Reviewed testosterone and impacts on vasomotor sx and will continue to manage that with endo at [facility]  Flu shot today  Declines [employer] today b/c of golf outing this weekend but will schedule in near future    MD CRESENCIO [facility] [place]

## 2022-09-30 ENCOUNTER — OFFICE VISIT (OUTPATIENT)
Dept: OBGYN | Facility: CLINIC | Age: 42
End: 2022-09-30
Payer: COMMERCIAL

## 2022-09-30 VITALS
BODY MASS INDEX: 30.92 KG/M2 | DIASTOLIC BLOOD PRESSURE: 79 MMHG | HEIGHT: 67 IN | SYSTOLIC BLOOD PRESSURE: 122 MMHG | WEIGHT: 197 LBS

## 2022-09-30 DIAGNOSIS — Z78.9 FEMALE-TO-MALE TRANSGENDER PERSON: ICD-10-CM

## 2022-09-30 DIAGNOSIS — Z90.710 S/P HYSTERECTOMY WITH OOPHORECTOMY: ICD-10-CM

## 2022-09-30 DIAGNOSIS — Z09 POSTOP CHECK: Primary | ICD-10-CM

## 2022-09-30 DIAGNOSIS — Z23 NEED FOR PROPHYLACTIC VACCINATION AND INOCULATION AGAINST INFLUENZA: ICD-10-CM

## 2022-09-30 DIAGNOSIS — Z90.721 S/P HYSTERECTOMY WITH OOPHORECTOMY: ICD-10-CM

## 2022-09-30 PROCEDURE — 90471 IMMUNIZATION ADMIN: CPT | Performed by: OBSTETRICS & GYNECOLOGY

## 2022-09-30 PROCEDURE — 90686 IIV4 VACC NO PRSV 0.5 ML IM: CPT | Performed by: OBSTETRICS & GYNECOLOGY

## 2022-09-30 PROCEDURE — 99024 POSTOP FOLLOW-UP VISIT: CPT | Performed by: OBSTETRICS & GYNECOLOGY

## 2022-09-30 RX ORDER — CLINDAMYCIN HCL 300 MG
CAPSULE ORAL
COMMUNITY
Start: 2021-12-03 | End: 2023-01-06

## 2022-11-07 ENCOUNTER — MYC MEDICAL ADVICE (OUTPATIENT)
Dept: FAMILY MEDICINE | Facility: CLINIC | Age: 42
End: 2022-11-07

## 2022-11-07 DIAGNOSIS — F64.0 TRANSGENDER PERSON ON HORMONE THERAPY: Primary | ICD-10-CM

## 2022-11-07 DIAGNOSIS — Z79.899 TRANSGENDER PERSON ON HORMONE THERAPY: Primary | ICD-10-CM

## 2022-11-07 RX ORDER — TESTOSTERONE ENANTHATE 75 MG/.5ML
75 INJECTION SUBCUTANEOUS WEEKLY
Qty: 4 ML | Refills: 0 | Status: SHIPPED | OUTPATIENT
Start: 2022-11-07 | End: 2022-12-21

## 2022-11-07 NOTE — TELEPHONE ENCOUNTER
Requested Medication: Xyosted  Dose: 75 mg  Quantity:8 mL  Refills: 2    Inject 75 mg subcutaneous, once a week     Last seen at [facility]: 06/27/2022  Next Appointment with Provider:  Visit date not found    RADHA Quevedo

## 2022-12-21 DIAGNOSIS — Z79.899 TRANSGENDER PERSON ON HORMONE THERAPY: ICD-10-CM

## 2022-12-21 DIAGNOSIS — F64.0 TRANSGENDER PERSON ON HORMONE THERAPY: ICD-10-CM

## 2022-12-21 RX ORDER — TESTOSTERONE ENANTHATE 75 MG/.5ML
75 INJECTION SUBCUTANEOUS WEEKLY
Qty: 4 ML | Refills: 0 | Status: SHIPPED | OUTPATIENT
Start: 2022-12-21 | End: 2023-01-06

## 2022-12-21 NOTE — TELEPHONE ENCOUNTER
Requested Medication: Xyostad   Dose: 75mg  Quantity: 4  Refills: 0    Inject 75 mg once weekly    Last seen at [facility]: 6/27 - 6 mo  Next Appointment with Provider: 1/6/2023      Concetta Callahan CMA

## 2023-01-06 ENCOUNTER — VIRTUAL VISIT (OUTPATIENT)
Dept: FAMILY MEDICINE | Facility: CLINIC | Age: 43
End: 2023-01-06
Payer: COMMERCIAL

## 2023-01-06 DIAGNOSIS — Z79.899 TRANSGENDER PERSON ON HORMONE THERAPY: ICD-10-CM

## 2023-01-06 DIAGNOSIS — F64.0 TRANSGENDER PERSON ON HORMONE THERAPY: ICD-10-CM

## 2023-01-06 PROCEDURE — 99213 OFFICE O/P EST LOW 20 MIN: CPT | Mod: 95 | Performed by: FAMILY MEDICINE

## 2023-01-06 RX ORDER — TESTOSTERONE ENANTHATE 75 MG/.5ML
75 INJECTION SUBCUTANEOUS WEEKLY
Qty: 4 ML | Refills: 4 | Status: SHIPPED | OUTPATIENT
Start: 2023-01-06 | End: 2023-08-09

## 2023-01-06 ASSESSMENT — ENCOUNTER SYMPTOMS
CHILLS: 0
UNEXPECTED WEIGHT CHANGE: 0
SHORTNESS OF BREATH: 0
FEVER: 0

## 2023-01-06 NOTE — PROGRESS NOTES
The patient has been notified of following:       Patient has given verbal consent for Video visit? Yes    Patient would like the video invitation sent by: via SmartShoot    Video Start Time: 8:02 AM              ZOEY Schilling is a 42 year old individual that uses pronouns He/Him/His/Himself that presents today for follow up of:  masculinizing hormone therapy.   Alone or accompanied by: accompanied today by  Gender identity: male  Started Hormone  therapy    Continues on Xyosted 75 mg weekly  Any special concerns today?    Had Hysterectomy 2022, no complications  No concerns or questions  Has labs scheduled next week--had travel issues earlier last month    On hormones?  YES +++ Shot day of the week?       Due for labs?  Yes      +++ Refills of meds needed?  Yes  Gender related body changes since last visit: continued hair growth    Breakthrough bleeding? Does Not Apply s/p hyst    New health concerns since last visit:  --- none    Past Surgical History:   Procedure Laterality Date     CHOLECYSTECTOMY  age 21     CYSTOSCOPY N/A 2022    Procedure: CYSTOSCOPY;  Surgeon: Wilda Whitfield MD;  Location:  OR     DILATION AND CURETTAGE SUCTION, TREAT INCOMPLETE   10/16/2012    Procedure: DILATION AND CURETTAGE SUCTION, TREAT INCOMPLETE ;  suction dilation and curettage;  Surgeon: Wilda Whitfield MD;  Location:  GI     LAPAROSCOPIC HYSTERECTOMY TOTAL, BILATERAL SALPINGO-OOPHORECTOMY, COMBINED Bilateral 2022    Procedure: TOTAL LAPAROSCOPIC HYSTERECTOMY  WITH BILATERAL SALPINGO-OOPHORECTOMY;  Surgeon: Wilda Whitfield MD;  Location:  OR     TRANSGENDER MASTECTOMY Bilateral 2020    Procedure: bilateral simple mastectomy,  bilateral nipple grafts, OnQ;  Surgeon: Holli Santacruz MD;  Location:  OR       Patient Active Problem List   Diagnosis     Gender dysphoria in adult     Gender dysphoria     S/P hysterectomy with oophorectomy     Female-to-male transgender person        Current Outpatient Medications   Medication Sig Dispense Refill     Testosterone Enanthate (XYOSTED) 75 MG/0.5ML SOAJ Inject 75 mg Subcutaneous once a week 4 mL 0       History   Smoking Status     Never   Smokeless Tobacco     Never          Allergies   Allergen Reactions     Penicillins      Sulfa Drugs        There are no preventive care reminders to display for this patient.      Problem, Medication and Allergy Lists were reviewed and are current..         Review of Systems:   Review of Systems   Constitutional: Negative for chills, fever and unexpected weight change.   Respiratory: Negative for shortness of breath.    Cardiovascular: Negative for chest pain.              Labs:   Results from last visit:  Admission on 08/23/2022, Discharged on 08/23/2022   Component Date Value Ref Range Status     hCG Serum Qualitative 08/23/2022 Negative  Negative Final    This test is for screening purposes.  Results should be interpreted along with the clinical picture.  Confirmation testing is available if warranted by ordering [id number], HCG Quantitative Pregnancy.     WBC Count 08/23/2022 7.7  4.0 - 11.0 10e3/uL Final     RBC Count 08/23/2022 5.52  3.80 - 5.90 10e6/uL Final    Reference Range:                                                     Female 3.80-5.20 10e6/uL                                      Male 4.40-5.90 10e6u/L     Hemoglobin 08/23/2022 16.3  11.7 - 17.7 g/dL Final    Reference Range:                                                     Female 11.7-15.7 g/dL                                      Male 13.3-17.7 g/dL     Hematocrit 08/23/2022 47.6  35.0 - 53.0 % Final    Reference Range:                                                     Female 35.0-47.0 %                                            Male 40.0-54.0 %     MCV 08/23/2022 86  78 - 100 fL Final     MCH 08/23/2022 29.5  26.5 - 33.0 pg Final     MCHC 08/23/2022 34.2  31.5 - 36.5 g/dL Final     RDW 08/23/2022 12.7  10.0 - 15.0 % Final     Platelet  Count 08/23/2022 232  150 - 450 10e3/uL Final     % Neutrophils 08/23/2022 65  % Final     % Lymphocytes 08/23/2022 24  % Final     % Monocytes 08/23/2022 8  % Final     % Eosinophils 08/23/2022 2  % Final     % Basophils 08/23/2022 1  % Final     % Immature Granulocytes 08/23/2022 0  % Final     NRBCs per 100 WBC 08/23/2022 0  <1 /100 Final     Absolute Neutrophils 08/23/2022 5.0  1.6 - 8.3 10e3/uL Final     Absolute Lymphocytes 08/23/2022 1.8  0.8 - 5.3 10e3/uL Final     Absolute Monocytes 08/23/2022 0.6  0.0 - 1.3 10e3/uL Final     Absolute Eosinophils 08/23/2022 0.2  0.0 - 0.7 10e3/uL Final     Absolute Basophils 08/23/2022 0.1  0.0 - 0.2 10e3/uL Final     Absolute Immature Granulocytes 08/23/2022 0.0  <=0.4 10e3/uL Final     Absolute NRBCs 08/23/2022 0.0  10e3/uL Final     ABO/RH(D) 08/23/2022 A NEG   Final     Antibody Screen 08/23/2022 Negative  Negative Final     SPECIMEN EXPIRATION DATE 08/23/2022 [id number]   Final     Case Report 08/23/2022    Final                    Value:Surgical Pathology Report                         Case: [id number]                                  Authorizing Provider:  Wilda Whitfield MD         Collected:           08/23/2022 10:32 AM          Ordering Location:     [facility]          Received:            08/23/2022 12:59 PM                                 [facility] OR                                                            Pathologist:           Tyler Lord MD                                                          Specimen:    Uterus, Cervix, Bilateral Fallopian Tubes & Ovaries                                         Final Diagnosis 08/23/2022    Final                    Value:This result contains rich text formatting which cannot be displayed here.     Clinical Information 08/23/2022    Final                    Value:This result contains rich text formatting which cannot be displayed here.     Gross Description 08/23/2022    Final                     "Value:This result contains rich text formatting which cannot be displayed here.     Microscopic Description 08/23/2022    Final                    Value:This result contains rich text formatting which cannot be displayed here.     Performing Labs 08/23/2022    Final                    Value:This result contains rich text formatting which cannot be displayed here.     Reviewed post op note from 9/30/2022, vitals copied forward:  /79   Ht 1.689 m (5' 6.5\")   Wt 89.4 kg (197 lb)   BMI 31.32 kg/m    Body mass index is 31.32 kg/m .    Stable vitals over last 2 years    EXAM:  Constitutional: healthy, alert and no distress   Psychiatric: mentation appears normal and affect normal/bright     Assessment and Plan   1. Transgender person on hormone therapy s/p gender affirming surgery  Continue current dose Xyosted  Await lab results and adjust dose as needed      Follow-up 6 months, then yearly after that  Discussed can continue hormone care with PCP if desires as well.     Contraception:   not needed        Video-Visit Details    Type of service:  Video Visit    Video End Time (time video stopped): 810    Originating Location (pt. Location): Home    Distant Location (provider location):  [facility]     Mode of Communication:  Video Conference via video platform: Davian Chan MD        [facility] by [facility]  Questions were elicited and answered.     Fam Chan MD    "

## 2023-01-09 ENCOUNTER — DOCUMENTATION ONLY (OUTPATIENT)
Dept: LAB | Facility: CLINIC | Age: 43
End: 2023-01-09

## 2023-01-09 DIAGNOSIS — Z79.899 TRANSGENDER PERSON ON HORMONE THERAPY: Primary | ICD-10-CM

## 2023-01-09 DIAGNOSIS — F64.0 TRANSGENDER PERSON ON HORMONE THERAPY: Primary | ICD-10-CM

## 2023-01-09 NOTE — PROGRESS NOTES
Pt has a lab only coming up and currently no labs. Please review and place future orders.   Questions or concerns, please have your care team contact pt directly.    Thank You  Zo RICHARDSON

## 2023-01-11 ENCOUNTER — LAB (OUTPATIENT)
Dept: LAB | Facility: CLINIC | Age: 43
End: 2023-01-11
Payer: COMMERCIAL

## 2023-01-11 DIAGNOSIS — F64.0 TRANSGENDER PERSON ON HORMONE THERAPY: ICD-10-CM

## 2023-01-11 DIAGNOSIS — Z79.899 TRANSGENDER PERSON ON HORMONE THERAPY: ICD-10-CM

## 2023-01-11 LAB — HGB BLD-MCNC: 16.7 G/DL (ref 11.7–17.7)

## 2023-01-11 PROCEDURE — 36415 COLL VENOUS BLD VENIPUNCTURE: CPT

## 2023-01-11 PROCEDURE — 85018 HEMOGLOBIN: CPT

## 2023-01-11 PROCEDURE — 84403 ASSAY OF TOTAL TESTOSTERONE: CPT

## 2023-01-11 PROCEDURE — 84270 ASSAY OF SEX HORMONE GLOBUL: CPT

## 2023-01-12 LAB — SHBG SERPL-SCNC: 17 NMOL/L (ref 11–135)

## 2023-01-21 LAB
TESTOST FREE SERPL-MCNC: 15.91 NG/DL
TESTOST SERPL-MCNC: 547 NG/DL (ref 8–950)

## 2023-01-27 NOTE — RESULT ENCOUNTER NOTE
Schilling,    The results of your recent lab tests were normal.      Hormone levels  were  in the appropriate male range.        Please note that test explanations are brief and do not reflect all diagnostic uses.  If you have any questions or concerns, please contact me via [email or web address] or call the clinic at [phone].    Fam Chan MD, PhD

## 2023-04-16 ENCOUNTER — HEALTH MAINTENANCE LETTER (OUTPATIENT)
Age: 43
End: 2023-04-16

## 2023-08-09 DIAGNOSIS — F64.0 TRANSGENDER PERSON ON HORMONE THERAPY: ICD-10-CM

## 2023-08-09 DIAGNOSIS — Z79.899 TRANSGENDER PERSON ON HORMONE THERAPY: ICD-10-CM

## 2023-08-09 RX ORDER — TESTOSTERONE ENANTHATE 75 MG/.5ML
75 INJECTION SUBCUTANEOUS WEEKLY
Qty: 4 ML | Refills: 0 | Status: SHIPPED | OUTPATIENT
Start: 2023-08-09 | End: 2023-08-14

## 2023-08-09 NOTE — TELEPHONE ENCOUNTER
Requested Medication: Xyosted  Dose: 75mg  Quantity: 4ml  Refills: 4    Inject 75 mg once weekly    Last seen at [facility]: 1/6/2023 - 6 mo  Next Appointment with Provider: 8/14    Concetta Callahan CMA

## 2023-08-14 ENCOUNTER — VIRTUAL VISIT (OUTPATIENT)
Dept: FAMILY MEDICINE | Facility: CLINIC | Age: 43
End: 2023-08-14
Payer: COMMERCIAL

## 2023-08-14 DIAGNOSIS — F64.0 TRANSGENDER PERSON ON HORMONE THERAPY: ICD-10-CM

## 2023-08-14 DIAGNOSIS — Z79.899 TRANSGENDER PERSON ON HORMONE THERAPY: ICD-10-CM

## 2023-08-14 PROCEDURE — 99213 OFFICE O/P EST LOW 20 MIN: CPT | Mod: 95 | Performed by: FAMILY MEDICINE

## 2023-08-14 RX ORDER — TESTOSTERONE ENANTHATE 75 MG/.5ML
75 INJECTION SUBCUTANEOUS WEEKLY
Qty: 4 ML | Refills: 3 | Status: SHIPPED | OUTPATIENT
Start: 2023-08-14 | End: 2024-02-27

## 2023-08-14 ASSESSMENT — ENCOUNTER SYMPTOMS: SHORTNESS OF BREATH: 0

## 2023-08-14 NOTE — PROGRESS NOTES
Virtual Visit Details    Type of service:  Video Visit     Originating Location (pt. Location): Home    Distant Location (provider location):  On-site  Platform used for Video Visit: Addi

## 2023-08-14 NOTE — NURSING NOTE
Is the patient currently in the state of [place]? YES    Visit mode:VIDEO    If the visit is dropped, the patient can be reconnected by: VIDEO VISIT: Text to cell phone: [phone]    Will anyone else be joining the visit? NO      How would you like to obtain your AVS? MyChart    Are changes needed to the allergy or medication list? NO    Reason for visit: DAY Garcia VF

## 2023-08-14 NOTE — PROGRESS NOTES
The patient has been notified of following:       Patient has given verbal consent for Video visit? Yes    Patient would like the video invitation sent by: Send to e-mail at:     Video Start Time: 4:24 PM    malinda Schilling is a 43 year old individual that uses pronouns He/Him/His/Himself that presents today for follow up of:  masculinizing hormone therapy.   Alone or accompanied by: accompanied today by  Gender identity: male  Started Hormone  therapy    Continues on Xyosted 75 mg weekly  Any special concerns today?    No concerns, doing well    On hormones?  YES +++ Shot day of the week?       Due for labs?  Yes      +++ Refills of meds needed?  Yes  Gender related body changes since last visit:   Stable     Breakthrough bleeding? Does Not Apply    New health concerns since last visit:  ---none    Past Surgical History:   Procedure Laterality Date    CHOLECYSTECTOMY  age 21    CYSTOSCOPY N/A 2022    Procedure: CYSTOSCOPY;  Surgeon: Wilda Whitfield MD;  Location:  OR    DILATION AND CURETTAGE SUCTION, TREAT INCOMPLETE   10/16/2012    Procedure: DILATION AND CURETTAGE SUCTION, TREAT INCOMPLETE ;  suction dilation and curettage;  Surgeon: Wilda Whitfield MD;  Location:  GI    LAPAROSCOPIC HYSTERECTOMY TOTAL, BILATERAL SALPINGO-OOPHORECTOMY, COMBINED Bilateral 2022    Procedure: TOTAL LAPAROSCOPIC HYSTERECTOMY  WITH BILATERAL SALPINGO-OOPHORECTOMY;  Surgeon: Wilda Whitfield MD;  Location:  OR    TRANSGENDER MASTECTOMY Bilateral 2020    Procedure: bilateral simple mastectomy,  bilateral nipple grafts, OnQ;  Surgeon: Holli Santacruz MD;  Location:  OR       Patient Active Problem List   Diagnosis    Gender dysphoria in adult    Gender dysphoria    S/P hysterectomy with oophorectomy    Female-to-male transgender person       Current Outpatient Medications   Medication Sig Dispense Refill    Testosterone Enanthate (XYOSTED) 75 MG/0.5ML SOAJ Inject 75 mg  Subcutaneous once a week 4 mL 0       History   Smoking Status    Never   Smokeless Tobacco    Never          Allergies   Allergen Reactions    Penicillins     Sulfa Antibiotics        There are no preventive care reminders to display for this patient.      Problem, Medication and Allergy Lists were reviewed and are current..         Review of Systems:   Review of Systems   Respiratory:  Negative for shortness of breath.    Cardiovascular:  Negative for chest pain.              Labs:   Results from last visit:  Lab on 01/11/2023   Component Date Value Ref Range Status    Hemoglobin 01/11/2023 16.7  11.7 - 17.7 g/dL Final    Sex Specific Reference Ranges:    Female  11.7-15.7  g/dL  Male    13.3-17.7   g/dL      Sex Hormone Binding Globulin 01/11/2023 17  11 - 135 nmol/L Final    Female Reference Range:  David Stage I:  nmol/L  David Stage II:  nmol/L  David Stage III: 12-98 nmol/L  David Stage IV:  nmol/L  David Stage V:  nmol/L    Male Reference Range:  David Stage I:  nmol/L  David Stage II:  nmol/L  David Stage III:  nmol/L  David Stage IV: 11-60 nmol/L  David Stage V: 11-71 nmol/L    Free Testosterone Calculated 01/11/2023 15.91  ng/dL Final    Testosterone Total 01/11/2023 547  8 - 950 ng/dL Final    Comment:   MALE:  0 to 5 months:  ng/dL  6 months to 9 years: <2-20 ng/dL  10 to 11 years: <2-130 ng/dL  12 to 13 years: <2-800 ng/dL  14 years: <2-1200 ng/dL  15 to 16 years: 100-1200 ng/dL  17 to 18 years: 300-1200 ng/dL  19 years and older: 240-950 ng/dL    FEMALE:  0 to 5 months: 20-80 ng/dL  6 months to 9 years: <2-20 ng/dL  10 to 11 years: <2-44 ng/dL  12 to 16 years: <2-75 ng/dL  17 to 18 years: 20-75 ng/dL  19 years and older: 8-60 ng/dL    Values by David Stage:  Stage I (prepubertal)  Male: <2 to 20 ng/dL  Female: <2 to 20 ng/dL    Stage II  Male: 8 to 66 ng/dL  Female: <2 to 47 ng/dL    Stage III  Male: 26 to 800 ng/dL  Female: 17 to 75  ng/dL    Stage IV  Male: 85 to 1200 ng/dL  Female: 20 to 75 ng/dL    Stage V (young adult)  Male: 300 to 950 ng/dL  Female: 12 to 60 ng/dL    Puberty onset (transition from David Stage I to David Stage II) occurs for boys at a median age of 11.5 years and for girls at median age of 10.5 years. There is evidence that it may occur up to 1 year                            earlier in obese girls and in  girls. For boys there is no definite proven relationship between puberty onset and body weight or ethnic origin. Progression through David stages is variable. David Stage V (young adult) should be reached by age 18.             EXAM:  Constitutional: healthy, alert, and no distress   Psychiatric: mentation appears normal and affect normal/bright     Assessment and Plan   Transgender person on hormone therapy  Clinically appropriate response to current gender affirming hormone regimen.    No changes, doing well  Labs: hgb, testosterone by January    Follow-up 6 months      Video-Visit Details    Type of service:  Video Visit    Video End Time (time video stopped): 429    Originating Location (pt. Location): Home    Distant Location (provider location):  [facility]     Mode of Communication:  Video Conference via video platform: Davian Chan MD      Results by [facility]  Questions were elicited and answered.     Fam Chan MD

## 2024-01-22 ENCOUNTER — OFFICE VISIT (OUTPATIENT)
Dept: FAMILY MEDICINE | Facility: CLINIC | Age: 44
End: 2024-01-22
Payer: COMMERCIAL

## 2024-01-22 VITALS
SYSTOLIC BLOOD PRESSURE: 112 MMHG | WEIGHT: 194.8 LBS | HEART RATE: 75 BPM | HEIGHT: 66 IN | TEMPERATURE: 96.4 F | OXYGEN SATURATION: 96 % | RESPIRATION RATE: 16 BRPM | DIASTOLIC BLOOD PRESSURE: 64 MMHG | BODY MASS INDEX: 31.31 KG/M2

## 2024-01-22 DIAGNOSIS — M62.838 MUSCLE SPASM: ICD-10-CM

## 2024-01-22 DIAGNOSIS — S20.212D CONTUSION OF LEFT CHEST WALL, SUBSEQUENT ENCOUNTER: Primary | ICD-10-CM

## 2024-01-22 PROCEDURE — 99213 OFFICE O/P EST LOW 20 MIN: CPT | Performed by: PHYSICIAN ASSISTANT

## 2024-01-22 RX ORDER — CYCLOBENZAPRINE HCL 10 MG
5-10 TABLET ORAL 3 TIMES DAILY PRN
Qty: 30 TABLET | Refills: 0 | Status: SHIPPED | OUTPATIENT
Start: 2024-01-22 | End: 2024-02-27

## 2024-01-22 ASSESSMENT — PAIN SCALES - GENERAL: PAINLEVEL: NO PAIN (0)

## 2024-01-22 NOTE — PROGRESS NOTES
"  Assessment & Plan     Contusion of left chest wall, subsequent encounter  Muscle spasm  Trial off percocet  Add tylenol and flexeril  Continue toradol  Continue ice and heat.   Gentle ROM  - cyclobenzaprine (FLEXERIL) 10 MG tablet  Dispense: 30 tablet; Refill: 0        Subjective   Shakir is a 43 year old, presenting for the following health issues:  Follow Up (ED)        1/22/2024     8:47 AM   Additional Questions   Roomed by Madina DUPONT     [facility]     ED/ Followup:    Facility:  [facility]   Date of visit: 1/13/2024  Reason for visit: Chest injury and pain   Current Status:  Pt states they are sore and still feels swollen on the left side of chest, side and shoulder. Pt states they feel exhausted.     ED in [place] 1/13/24 for chest wall injury after he was hit with a line drive softball to left chest wall.  Chest CT without acute findings   Discharged with ibuprofen and #6 tabs of percocet    Seen at [facility] in [place] 1/16/24 for follow up and pain med refill due continued significant pain   Given prescription for oral toradol and percocet #10 tabs but didn't fill this percocet prescription     Feels there has been some improvement  Pain with some deep breathing and quick movements  No shortness of breath  Using percocet at nighttime only  Has some tension in neck as well     Review of Systems  Constitutional, HEENT, cardiovascular, pulmonary, gi and gu systems are negative, except as otherwise noted.      Objective    /64 (BP Location: Right arm, Patient Position: Sitting, Cuff Size: Adult Large)   Pulse 75   Temp (!) 96.4  F (35.8  C) (Tympanic)   Resp 16   Ht 1.688 m (5' 6.44\")   Wt 88.4 kg (194 lb 12.8 oz)   SpO2 96%   BMI 31.03 kg/m    Body mass index is 31.03 kg/m .  Physical Exam   GENERAL: alert and no distress  RESP: lungs clear to auscultation - no rales, rhonchi or wheezes  CV: regular rate and rhythm, normal S1 S2, no S3 or S4, no murmur, click or rub  MS: tenderness to " palpation over left lower anterior chest wall without overlying skin changes, tightness and tenderness in left trapezius and posterior neck  SKIN: no suspicious lesions or rashes  NEURO: Normal strength and tone, mentation intact and speech normal  PSYCH: mentation appears normal, affect normal/bright        Signed Electronically by: Jessica Toure PA-C on 1/22/2024 at 8:59 AM

## 2024-01-22 NOTE — PATIENT INSTRUCTIONS
Hold percocet tonight    Start tylenol, 500-1,000 mg up to 4 times daily     Continue toradol as needed    Add flexeril/cyclobenzaprine 5-10 mg up to 3 times daily - caution sedation     Continue ice and heat

## 2024-02-27 ENCOUNTER — OFFICE VISIT (OUTPATIENT)
Dept: PEDIATRICS | Facility: CLINIC | Age: 44
End: 2024-02-27
Payer: COMMERCIAL

## 2024-02-27 VITALS
HEIGHT: 67 IN | BODY MASS INDEX: 30.78 KG/M2 | DIASTOLIC BLOOD PRESSURE: 88 MMHG | HEART RATE: 80 BPM | WEIGHT: 196.1 LBS | TEMPERATURE: 98.7 F | OXYGEN SATURATION: 96 % | RESPIRATION RATE: 18 BRPM | SYSTOLIC BLOOD PRESSURE: 126 MMHG

## 2024-02-27 DIAGNOSIS — Z76.89 ENCOUNTER TO ESTABLISH CARE: ICD-10-CM

## 2024-02-27 DIAGNOSIS — Z13.1 SCREENING FOR DIABETES MELLITUS: ICD-10-CM

## 2024-02-27 DIAGNOSIS — Z13.220 SCREENING CHOLESTEROL LEVEL: ICD-10-CM

## 2024-02-27 DIAGNOSIS — F64.0 TRANSGENDER PERSON ON HORMONE THERAPY: Primary | ICD-10-CM

## 2024-02-27 DIAGNOSIS — Z79.899 TRANSGENDER PERSON ON HORMONE THERAPY: Primary | ICD-10-CM

## 2024-02-27 PROBLEM — Z90.49 S/P CHOLECYSTECTOMY: Status: ACTIVE | Noted: 2024-02-27

## 2024-02-27 LAB
HBA1C MFR BLD: 5.6 % (ref 0–5.6)
HGB BLD-MCNC: 16.6 G/DL (ref 11.7–17.7)

## 2024-02-27 PROCEDURE — 80061 LIPID PANEL: CPT | Performed by: STUDENT IN AN ORGANIZED HEALTH CARE EDUCATION/TRAINING PROGRAM

## 2024-02-27 PROCEDURE — 36415 COLL VENOUS BLD VENIPUNCTURE: CPT | Performed by: STUDENT IN AN ORGANIZED HEALTH CARE EDUCATION/TRAINING PROGRAM

## 2024-02-27 PROCEDURE — 85018 HEMOGLOBIN: CPT | Performed by: STUDENT IN AN ORGANIZED HEALTH CARE EDUCATION/TRAINING PROGRAM

## 2024-02-27 PROCEDURE — 83036 HEMOGLOBIN GLYCOSYLATED A1C: CPT | Performed by: STUDENT IN AN ORGANIZED HEALTH CARE EDUCATION/TRAINING PROGRAM

## 2024-02-27 PROCEDURE — 84403 ASSAY OF TOTAL TESTOSTERONE: CPT | Performed by: STUDENT IN AN ORGANIZED HEALTH CARE EDUCATION/TRAINING PROGRAM

## 2024-02-27 PROCEDURE — 99214 OFFICE O/P EST MOD 30 MIN: CPT | Performed by: STUDENT IN AN ORGANIZED HEALTH CARE EDUCATION/TRAINING PROGRAM

## 2024-02-27 PROCEDURE — 80053 COMPREHEN METABOLIC PANEL: CPT | Performed by: STUDENT IN AN ORGANIZED HEALTH CARE EDUCATION/TRAINING PROGRAM

## 2024-02-27 RX ORDER — TESTOSTERONE ENANTHATE 75 MG/.5ML
75 INJECTION SUBCUTANEOUS WEEKLY
Qty: 4 ML | Refills: 3 | Status: SHIPPED | OUTPATIENT
Start: 2024-03-12 | End: 2024-08-08

## 2024-02-27 ASSESSMENT — PAIN SCALES - GENERAL: PAINLEVEL: NO PAIN (0)

## 2024-02-27 NOTE — PROGRESS NOTES
"  Assessment & Plan     Transgender person on hormone therapy  On testosterone prefilled syringes. $0 copay for monthly, $15 refill for 2 month refills. IF needed to switch to other formulation would need teaching on drawing up T from vial.  - Hemoglobin; Future  - Testosterone, total; Future  - Hemoglobin  - Testosterone, total    Screening for diabetes mellitus  - Comprehensive metabolic panel (BMP + Alb, Alk Phos, ALT, AST, Total. Bili, TP); Future  - Hemoglobin A1c; Future  - Comprehensive metabolic panel (BMP + Alb, Alk Phos, ALT, AST, Total. Bili, TP)  - Hemoglobin A1c    Screening cholesterol level  - Lipid panel reflex to direct LDL Non-fasting; Future  - Lipid panel reflex to direct LDL Non-fasting    Encounter to establish care  Will follow up in 6 months for annual physical.          MED REC REQUIRED  Post Medication Reconciliation Status:  Patient was not discharged from an inpatient facility or TCU  BMI  Estimated body mass index is 31.14 kg/m  as calculated from the following:    Height as of this encounter: 1.69 m (5' 6.54\").    Weight as of this encounter: 89 kg (196 lb 1.6 oz).             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  -able to exercise more when home than on the road  For fun: plays softball (sometimes basketball but not currently due to chest contusion)  2 children: 10 and 13 year old    Has been taking Tylenol PM at night due to insomnia  -trouble falling asleep  -does Pel[employer] meditations  -eye mask  -melatonin, essential oils  -once sleeping stays asleep    Started T during COVID19   -has pre filled syringes  -has not learned to draw up medication    Adopted so no known family history  -found maternal birth " "family        ED/UC Followup:  Facility:  [facility]   Date of visit: 1/13/24  Reason for visit: Chest wall contusion   Current Status: Doing better but could be better     ED/UC Followup:  Facility:  [facility]   Date of visit: 1/16/24  Reason for visit: Chest wall injury   Current Status: Doing better but could be better           Objective    /88 (BP Location: Right arm, Patient Position: Sitting, Cuff Size: Adult Large)   Pulse 80   Temp 98.7  F (37.1  C) (Tympanic)   Resp 18   Ht 1.69 m (5' 6.54\")   Wt 89 kg (196 lb 1.6 oz)   LMP 05/30/2020 (Approximate)   SpO2 96%   BMI 31.14 kg/m    Body mass index is 31.14 kg/m .  Physical Exam   GEN: Alert and appropriately interactive for age  EYES: Eyes grossly normal to inspection, conjunctivae and sclerae normal  RESP: Breathing comfortably on room air   CV: Warm and well perfused peripheral extremities   MS: no gross musculoskeletal defects noted, no edema  NEURO: Alert and oriented. Speech fluent.    PSYCH: Affect normal/bright. Appearance well groomed.           Signed Electronically by: Nikia Avila MD    "

## 2024-02-27 NOTE — PATIENT INSTRUCTIONS
"-Look into CBT-I workbooks. Resources given:  1) \"Say Jonel to Insomnia\" by Jg Macias, PhD at [employer]: 6-week program  2) \"Overcoming Insomnia: A Cognitive-Behavioral Therapy Approach Workbook\" by Amadou Sands and Eladia Arreola  3) \"Quiet Your Mind and Get to Sleep: Solutions to Insomnia for Those with Depression, Anxiety or Chronic Pain ([place] Self-Help Workbook) by Eladia Arreola and Nova Menon        Family history questions:  Cancer history  -colon cancer    Heart disease  -heart attacks, stents, bypass surgery    Stroke    Diabetes mellitus     Hypertension (high blood pressure)  "

## 2024-02-28 LAB
ALBUMIN SERPL BCG-MCNC: 4.6 G/DL (ref 3.5–5.2)
ALP SERPL-CCNC: 55 U/L (ref 40–150)
ALT SERPL W P-5'-P-CCNC: 34 U/L (ref 0–70)
ANION GAP SERPL CALCULATED.3IONS-SCNC: 10 MMOL/L (ref 7–15)
AST SERPL W P-5'-P-CCNC: 24 U/L (ref 0–45)
BILIRUB SERPL-MCNC: 0.6 MG/DL
BUN SERPL-MCNC: 12.8 MG/DL (ref 6–20)
CALCIUM SERPL-MCNC: 9.6 MG/DL (ref 8.6–10)
CHLORIDE SERPL-SCNC: 103 MMOL/L (ref 98–107)
CHOLEST SERPL-MCNC: 234 MG/DL
CREAT SERPL-MCNC: 1.07 MG/DL (ref 0.51–1.17)
DEPRECATED HCO3 PLAS-SCNC: 29 MMOL/L (ref 22–29)
EGFRCR SERPLBLD CKD-EPI 2021: 89 ML/MIN/1.73M2
FASTING STATUS PATIENT QL REPORTED: NO
GLUCOSE SERPL-MCNC: 86 MG/DL (ref 70–99)
HDLC SERPL-MCNC: 33 MG/DL
LDLC SERPL CALC-MCNC: 123 MG/DL
NONHDLC SERPL-MCNC: 201 MG/DL
POTASSIUM SERPL-SCNC: 4.5 MMOL/L (ref 3.4–5.3)
PROT SERPL-MCNC: 7.3 G/DL (ref 6.4–8.3)
SODIUM SERPL-SCNC: 142 MMOL/L (ref 135–145)
TRIGL SERPL-MCNC: 392 MG/DL

## 2024-02-29 LAB — TESTOST SERPL-MCNC: 734 NG/DL (ref 8–950)

## 2024-06-23 ENCOUNTER — HEALTH MAINTENANCE LETTER (OUTPATIENT)
Age: 44
End: 2024-06-23

## 2024-08-04 SDOH — HEALTH STABILITY: PHYSICAL HEALTH: ON AVERAGE, HOW MANY DAYS PER WEEK DO YOU ENGAGE IN MODERATE TO STRENUOUS EXERCISE (LIKE A BRISK WALK)?: 4 DAYS

## 2024-08-04 SDOH — HEALTH STABILITY: PHYSICAL HEALTH: ON AVERAGE, HOW MANY MINUTES DO YOU ENGAGE IN EXERCISE AT THIS LEVEL?: 50 MIN

## 2024-08-04 ASSESSMENT — SOCIAL DETERMINANTS OF HEALTH (SDOH): HOW OFTEN DO YOU GET TOGETHER WITH FRIENDS OR RELATIVES?: TWICE A WEEK

## 2024-08-08 ENCOUNTER — OFFICE VISIT (OUTPATIENT)
Dept: PEDIATRICS | Facility: CLINIC | Age: 44
End: 2024-08-08
Payer: COMMERCIAL

## 2024-08-08 VITALS
HEIGHT: 66 IN | WEIGHT: 194.7 LBS | OXYGEN SATURATION: 97 % | HEART RATE: 55 BPM | TEMPERATURE: 97 F | BODY MASS INDEX: 31.29 KG/M2 | RESPIRATION RATE: 18 BRPM | DIASTOLIC BLOOD PRESSURE: 82 MMHG | SYSTOLIC BLOOD PRESSURE: 125 MMHG

## 2024-08-08 DIAGNOSIS — Z79.899 TRANSGENDER PERSON ON HORMONE THERAPY: ICD-10-CM

## 2024-08-08 DIAGNOSIS — Z00.00 ROUTINE GENERAL MEDICAL EXAMINATION AT A HEALTH CARE FACILITY: Primary | ICD-10-CM

## 2024-08-08 DIAGNOSIS — F64.0 TRANSGENDER PERSON ON HORMONE THERAPY: ICD-10-CM

## 2024-08-08 DIAGNOSIS — R25.2 LEG CRAMPING: ICD-10-CM

## 2024-08-08 LAB
CHOLEST SERPL-MCNC: 208 MG/DL
FASTING STATUS PATIENT QL REPORTED: YES
FERRITIN SERPL-MCNC: 165 NG/ML (ref 6–409)
HDLC SERPL-MCNC: 38 MG/DL
HGB BLD-MCNC: 15.8 G/DL (ref 11.7–17.7)
LDLC SERPL CALC-MCNC: 140 MG/DL
NONHDLC SERPL-MCNC: 170 MG/DL
TRIGL SERPL-MCNC: 149 MG/DL

## 2024-08-08 PROCEDURE — 82728 ASSAY OF FERRITIN: CPT | Performed by: STUDENT IN AN ORGANIZED HEALTH CARE EDUCATION/TRAINING PROGRAM

## 2024-08-08 PROCEDURE — 80061 LIPID PANEL: CPT | Performed by: STUDENT IN AN ORGANIZED HEALTH CARE EDUCATION/TRAINING PROGRAM

## 2024-08-08 PROCEDURE — 90471 IMMUNIZATION ADMIN: CPT | Performed by: STUDENT IN AN ORGANIZED HEALTH CARE EDUCATION/TRAINING PROGRAM

## 2024-08-08 PROCEDURE — 36415 COLL VENOUS BLD VENIPUNCTURE: CPT | Performed by: STUDENT IN AN ORGANIZED HEALTH CARE EDUCATION/TRAINING PROGRAM

## 2024-08-08 PROCEDURE — 85018 HEMOGLOBIN: CPT | Performed by: STUDENT IN AN ORGANIZED HEALTH CARE EDUCATION/TRAINING PROGRAM

## 2024-08-08 PROCEDURE — 99396 PREV VISIT EST AGE 40-64: CPT | Mod: 25 | Performed by: STUDENT IN AN ORGANIZED HEALTH CARE EDUCATION/TRAINING PROGRAM

## 2024-08-08 PROCEDURE — 90715 TDAP VACCINE 7 YRS/> IM: CPT | Performed by: STUDENT IN AN ORGANIZED HEALTH CARE EDUCATION/TRAINING PROGRAM

## 2024-08-08 PROCEDURE — 84403 ASSAY OF TOTAL TESTOSTERONE: CPT | Performed by: STUDENT IN AN ORGANIZED HEALTH CARE EDUCATION/TRAINING PROGRAM

## 2024-08-08 RX ORDER — TESTOSTERONE ENANTHATE 75 MG/.5ML
75 INJECTION SUBCUTANEOUS WEEKLY
Qty: 4 ML | Refills: 5 | Status: SHIPPED | OUTPATIENT
Start: 2024-08-08

## 2024-08-08 RX ORDER — TESTOSTERONE ENANTHATE 75 MG/.5ML
75 INJECTION SUBCUTANEOUS WEEKLY
Qty: 4 ML | Refills: 3 | Status: CANCELLED | OUTPATIENT
Start: 2024-08-08

## 2024-08-08 ASSESSMENT — PAIN SCALES - GENERAL: PAINLEVEL: NO PAIN (0)

## 2024-08-08 NOTE — PROGRESS NOTES
"Preventive Care Visit  [facility] Deirdre E. Milligan, MD, Internal Medicine - Pediatrics  Aug 8, 2024      Assessment & Plan     Routine general medical examination at a health care facility  - Lipid panel reflex to direct LDL Fasting; Future  - Lipid panel reflex to direct LDL Fasting    Transgender person on hormone therapy  Annual visits - stable dosing.  - Testosterone, total; Future  - Testosterone, total    Leg cramping  Will check for iron deficiency.   - Ferritin; Future  - Hemoglobin; Future  - Ferritin  - Hemoglobin            BMI  Estimated body mass index is 31.18 kg/m  as calculated from the following:    Height as of this encounter: 1.683 m (5' 6.26\").    Weight as of this encounter: 88.3 kg (194 lb 11.2 oz).       Counseling  Appropriate preventive services were addressed with this patient via screening, questionnaire, or discussion as appropriate for fall prevention, nutrition, physical activity, Tobacco-use cessation, weight loss and cognition.  Checklist reviewing preventive services available has been given to the patient.  Reviewed patient's diet, addressing concerns and/or questions.   She is at risk for psychosocial distress and has been provided with information to reduce risk.           Max Schilling is a 44 year old, presenting for the following:  Physical        8/8/2024     8:16 AM   Additional Questions   Roomed by Angely Self   Accompanied by N/A        Health Care Directive  Patient does not have a Health Care Directive or Living Will: Discussed advance care planning with patient; information given to patient to review.    HPI     to wife who wanted patient to talk about a few small topics  Daughter and son    Sleep  -tylenol PM every night  -once asleep stays asleep  -falling asleep is issue      Muscle cramping  -hydration drink helps  -calves and neck  -left neck gets tight; chiropractor weekly  -once per week      Has more info about biological " mother  -maternal grandfather:  of early MI age 39  -maternal grandmother: cancer of unknown primary with bone mets, fibromyalgia  -aunts/uncles had unknown cancer; one aunt had colon cancer in 50s  -mother: fibromyalgia, hypertension, stent in carotid, hyperlipidemia, atypical responses to medications   -multiple family members with depression and addiction  -half brother with schizophrenia    Exercise  -20 days into 45 day challenge  -[employer]: strength & cardo and softball              2024   General Health   How would you rate your overall physical health? Good   Feel stress (tense, anxious, or unable to sleep) Only a little      (!) STRESS CONCERN      2024   Nutrition   Three or more servings of calcium each day? Yes   Diet: Regular (no restrictions)   How many servings of fruit and vegetables per day? (!) 2-3   How many sweetened beverages each day? 0-1            2024   Exercise   Days per week of moderate/strenous exercise 4 days   Average minutes spent exercising at this level 50 min            2024   Social Factors   Frequency of gathering with friends or relatives Twice a week   Worry food won't last until get money to buy more No   Food not last or not have enough money for food? No   Do you have housing? (Housing is defined as stable permanent housing and does not include staying ouside in a car, in a tent, in an abandoned building, in an overnight shelter, or couch-surfing.) Yes   Are you worried about losing your housing? No   Lack of transportation? No   Unable to get utilities (heat,electricity)? No            2024   Dental   Dentist two times every year? Yes            2024   TB Screening   Were you born outside of the [place]? No              Today's PHQ-2 Score:       2024     8:34 AM   PHQ-2 (  [employer])   Q1: Little interest or pleasure in doing things 0   Q2: Feeling down, depressed or hopeless 0   PHQ-2 Score 0   Q1: Little interest or pleasure in doing things  "Not at all   Q2: Feeling down, depressed or hopeless Not at all   PHQ-2 Score 0         8/4/2024   Substance Use   Alcohol more than 3/day or more than 7/wk Not Applicable   Do you use any other substances recreationally? No        Social History     Tobacco Use    Smoking status: Never    Smokeless tobacco: Never   Vaping Use    Vaping status: Never Used   Substance Use Topics    Alcohol use: Not Currently     Comment: Rare     Drug use: No                  8/4/2024   STI Screening   New sexual partner(s) since last STI/HIV test? No        History of abnormal Pap smear: Status post hysterectomy with removal of cervix and no history of CIN2 or greater or cervical cancer. Health Maintenance and Surgical History updated.        Latest Ref Rng & Units 12/16/2019     4:25 PM 12/16/2019     4:00 PM   PAP / HPV   PAP (Historical)  NIL     HPV 16 DNA NEG^Negative  Negative    HPV 18 DNA NEG^Negative  Negative    Other HR HPV NEG^Negative  Negative      ASCVD Risk   The 10-year ASCVD risk score (Jose Ramon BOTELLO, et al., 2019) is: 3.2%    Values used to calculate the score:      Age: 44 years      Sex: Male      Is Non- : No      Diabetic: No      Tobacco smoker: No      Systolic Blood Pressure: 125 mmHg      Is BP treated: No      HDL Cholesterol: 33 mg/dL      Total Cholesterol: 234 mg/dL       Reviewed and updated as needed this visit by Provider                           Objective    Exam  /82 (BP Location: Right arm, Patient Position: Sitting, Cuff Size: Adult Large)   Pulse 55   Temp 97  F (36.1  C) (Temporal)   Resp 18   Ht 1.683 m (5' 6.26\")   Wt 88.3 kg (194 lb 11.2 oz)   LMP 05/30/2020 (Approximate)   SpO2 97%   BMI 31.18 kg/m     Estimated body mass index is 31.18 kg/m  as calculated from the following:    Height as of this encounter: 1.683 m (5' 6.26\").    Weight as of this encounter: 88.3 kg (194 lb 11.2 oz).    Physical Exam  GENERAL: alert and no distress  EYES: Eyes " grossly normal to inspection, and conjunctivae and sclerae normal  HENT: ear canals and TM's normal, nose and mouth without ulcers or lesions  NECK: no adenopathy, no asymmetry, masses, or scars  RESP: lungs clear to auscultation - no rales, rhonchi or wheezes  CV: regular rate and rhythm, normal S1 S2, no S3 or S4, no murmur, click or rub, no peripheral edema  ABDOMEN: soft, nontender, no hepatosplenomegaly, no masses   MS: no gross musculoskeletal defects noted, no edema  SKIN: no suspicious lesions or rashes  NEURO: Normal strength and tone, mentation intact and speech normal  PSYCH: mentation appears normal, affect normal/bright        Signed Electronically by: Deirdre E. Milligan, MD

## 2024-08-08 NOTE — PATIENT INSTRUCTIONS
Patient Education   Preventive Care Advice   This is general advice given by our system to help you stay healthy. However, your care team may have specific advice just for you. Please talk to your care team about your preventive care needs.  Nutrition  Eat 5 or more servings of fruits and vegetables each day.  Try wheat bread, brown rice and whole grain pasta (instead of white bread, rice, and pasta).  Get enough calcium and vitamin D. Check the label on foods and aim for 100% of the RDA (recommended daily allowance).  Lifestyle  Exercise at least 150 minutes each week  (30 minutes a day, 5 days a week).  Do muscle strengthening activities 2 days a week. These help control your weight and prevent disease.  No smoking.  Wear sunscreen to prevent skin cancer.  Have a dental exam and cleaning every 6 months.  Yearly exams  See your health care team every year to talk about:  Any changes in your health.  Any medicines your care team has prescribed.  Preventive care, family planning, and ways to prevent chronic diseases.  Shots (vaccines)   HPV shots (up to age 26), if you've never had them before.  Hepatitis B shots (up to age 59), if you've never had them before.  COVID-19 shot: Get this shot when it's due.  Flu shot: Get a flu shot every year.  Tetanus shot: Get a tetanus shot every 10 years.  Pneumococcal, hepatitis A, and RSV shots: Ask your care team if you need these based on your risk.  Shingles shot (for age 50 and up)  General health tests  Diabetes screening:  Starting at age 35, Get screened for diabetes at least every 3 years.  If you are younger than age 35, ask your care team if you should be screened for diabetes.  Cholesterol test: At age 39, start having a cholesterol test every 5 years, or more often if advised.  Bone density scan (DEXA): At age 50, ask your care team if you should have this scan for osteoporosis (brittle bones).  Hepatitis C: Get tested at least once in your life.  STIs (sexually  transmitted infections)  Before age 24: Ask your care team if you should be screened for STIs.  After age 24: Get screened for STIs if you're at risk. You are at risk for STIs (including HIV) if:  You are sexually active with more than one person.  You don't use condoms every time.  You or a partner was diagnosed with a sexually transmitted infection.  If you are at risk for HIV, ask about PrEP medicine to prevent HIV.  Get tested for HIV at least once in your life, whether you are at risk for HIV or not.  Cancer screening tests  Cervical cancer screening: If you have a cervix, begin getting regular cervical cancer screening tests starting at age 21.  Breast cancer scan (mammogram): If you've ever had breasts, begin having regular mammograms starting at age 40. This is a scan to check for breast cancer.  Colon cancer screening: It is important to start screening for colon cancer at age 45.  Have a colonoscopy test every 10 years (or more often if you're at risk) Or, ask your provider about stool tests like a FIT test every year or Cologuard test every 3 years.  To learn more about your testing options, visit:   .  For help making a decision, visit:   https://bit.ly/hq58111.  Prostate cancer screening test: If you have a prostate, ask your care team if a prostate cancer screening test (PSA) at age 55 is right for you.  Lung cancer screening: If you are a current or former smoker ages 50 to 80, ask your care team if ongoing lung cancer screenings are right for you.  For informational purposes only. Not to replace the advice of your health care provider. Copyright   2023 [employer]. All rights reserved. Clinically reviewed by the [facility]. [employer] 741407 - REV 01/24.

## 2024-08-09 LAB — TESTOST SERPL-MCNC: 550 NG/DL (ref 8–950)

## 2025-01-21 ENCOUNTER — MYC MEDICAL ADVICE (OUTPATIENT)
Dept: PEDIATRICS | Facility: CLINIC | Age: 45
End: 2025-01-21
Payer: COMMERCIAL

## 2025-01-22 ENCOUNTER — TELEPHONE (OUTPATIENT)
Dept: PEDIATRICS | Facility: CLINIC | Age: 45
End: 2025-01-22
Payer: COMMERCIAL

## 2025-01-22 NOTE — LETTER
Physician Name: Deirdre Milligan, MD   [facility] [place]  [address]  [address]  [place] [address]  Phone: [phone]  Fax: [phone]        1/23/2025    Patients Name: Shakir Dorsey  YOB: 1980  Payor: Payor: BCBS / Plan: [place] / Product Type: Indemnity /    ID: [id number]      Name of person submitting request form: Deirdre E. Milligan, MD .    Reason for Request: Prior Authorization Drug    Requested Medication: Testosterone Enanthate (XYOSTED) 75 MG/0.5ML SOAJ   Dosage: 75mg Quantity Requested: 4mL with 5 refills and Length of Treatment 1 year  Diagnosis: Gender dysphoria  Formulary Medications tried and when: (be specific and include detail (contraindications, allergies etc): Patient has failed using the following testosterone formulations   (I) Testosterone cypionate injection (generic Depo-Testosterone).  (II) Testosterone Enanthate 200MG/ML solution (there is currently no generic for XYOSTED 75 MG/0.5ML SOAJ)  Other Pertinant History: Patient has been on Xyosted for over 1 year with successful treatment and no negative side effects. Using larger vial testosterone results in inadequate administration and absorption of the medication leading to ineffective results.      I am requesting prior authorization for Xyosted testosterone formulation for my patient Shakir Dorsey as listed above due to failure of other injectable testosterone formulations and historical success using Xyosted. Please contact my office with questions/concerns.    Deirdre Milligan, MD  Internal Medicine & Pediatrics  [facility]  She/her

## 2025-01-22 NOTE — TELEPHONE ENCOUNTER
PRIOR AUTHORIZATION DENIED    Medication: Testosterone Enanthate (XYOSTED) 75 MG/0.5ML SOAJ     Denial Date: 1/22/2025    Denial Rational:     You have a history of failure, contraindication, or intolerance to BOTH of the following:  (I) Testosterone cypionate injection (generic Depo-Testosterone).  (II) Testosterone Enanthate 200MG/ML solution (there is currently no generic for XYOSTED 75 MG/0.5ML SOAJ)      Appeal Information: Review the plan's reasons for denial listed above. Please utilize that information to complete letter and provide specific, detailed clinical information/rationale of your patient's health status to address their denial reasons.

## 2025-01-22 NOTE — TELEPHONE ENCOUNTER
Central Prior Authorization Team   Phone: [phone]    PA Initiation    Medication: Testosterone Enanthate (XYOSTED) 75 MG/0.5ML SOAJ   Insurance Company: [employer] ([facility]) - Phone [phone] Fax [phone]  Pharmacy Filling the Rx: [facility] [id number] [place] [facility] [address]  [address]  Filling Pharmacy Phone: [phone]  Filling Pharmacy Fax:    Start Date: 1/22/2025    [facility] KEY: [id number]

## 2025-01-22 NOTE — TELEPHONE ENCOUNTER
PA team- please assist with starting PA for   Testosterone Enanthate (XYOSTED) 75 MG/0.5ML SOAJ   Inject 75 mg subcutaneously once a week     Thank you!   Purnima Nuñez RN

## 2025-01-22 NOTE — TELEPHONE ENCOUNTER
Patient needs to try and fail the 2 forms listed below per PA:   Testosterone Cypionate or Testosterone Enanthate.  Therese Willams LPN

## 2025-01-22 NOTE — TELEPHONE ENCOUNTER
Prior Authorization Retail Medication Request    Medication/Dose: Testosterone Enanthate (XYOSTED) 75 MG/0.5ML SOAJ   Diagnosis and ICD code (if different than what is on RX):     New/renewal/insurance change PA/secondary ins. PA:  Previously Tried and Failed:     Rationale:           Pharmacy Information (if different than what is on RX)  Name:     Phone:     Fax:     Clinic Information  Preferred routing pool for dept communication:

## 2025-01-23 NOTE — TELEPHONE ENCOUNTER
Letter of appeal written, under communications.    Deirdre Milligan, MD  Internal Medicine & Pediatrics  [facility]  She/her

## 2025-01-24 NOTE — TELEPHONE ENCOUNTER
Medication Appeal Initiation    We have initiated an appeal for the requested medication:  Medication: Testosterone Enanthate (XYOSTED) 75 MG/0.5ML SOAJ   Appeal Start Date:  1/24/2025  Insurance Company:    Comments:  Appeal has been faxed to insurance

## 2025-01-30 NOTE — TELEPHONE ENCOUNTER
Prior Authorization Approval    Authorization Effective Date: 1/1/2025  Authorization Expiration Date: 12/31/2025  Medication: Testosterone Enanthate (XYOSTED) 75 MG/0.5ML SOAJ   Reference #:     Insurance Company: [employer] ([facility]) - Phone [phone] Fax [phone]  Which Pharmacy is filling the prescription (Not needed for infusion/clinic administered): [facility] [id number] [place] [facility] [id number] [place] [address]  Pharmacy Notified: Yes  Patient Notified: Instructed pharmacy to notify patient when script is ready to /ship.

## 2025-03-16 ENCOUNTER — MYC REFILL (OUTPATIENT)
Dept: PEDIATRICS | Facility: CLINIC | Age: 45
End: 2025-03-16
Payer: COMMERCIAL

## 2025-03-16 DIAGNOSIS — F64.0 TRANSGENDER PERSON ON HORMONE THERAPY: ICD-10-CM

## 2025-03-16 DIAGNOSIS — Z79.899 TRANSGENDER PERSON ON HORMONE THERAPY: ICD-10-CM

## 2025-03-17 ENCOUNTER — MYC MEDICAL ADVICE (OUTPATIENT)
Dept: PEDIATRICS | Facility: CLINIC | Age: 45
End: 2025-03-17
Payer: COMMERCIAL

## 2025-03-18 RX ORDER — TESTOSTERONE ENANTHATE 75 MG/.5ML
75 INJECTION SUBCUTANEOUS WEEKLY
Qty: 4 ML | Refills: 5 | Status: SHIPPED | OUTPATIENT
Start: 2025-03-18

## 2025-07-14 ENCOUNTER — PATIENT OUTREACH (OUTPATIENT)
Dept: CARE COORDINATION | Facility: CLINIC | Age: 45
End: 2025-07-14
Payer: COMMERCIAL

## (undated) DEVICE — STRAP KNEE/BODY 31143004

## (undated) DEVICE — Device

## (undated) DEVICE — GOWN XLG DISP 9545

## (undated) DEVICE — ENDO TROCAR SLEEVE KII ADV FIXATION 05X100MM CFS02

## (undated) DEVICE — SPONGE LAP 18X18" X8435

## (undated) DEVICE — PREP DURAPREP 26ML APL 8630

## (undated) DEVICE — COVER CAMERA IN-LIGHT DISP LT-C02

## (undated) DEVICE — SU MONOCRYL 4-0 PS-2 18" UND Y496G

## (undated) DEVICE — ESU GROUND PAD UNIVERSAL W/O CORD

## (undated) DEVICE — ENDO TROCAR FIRST ENTRY KII FIOS ADV FIX 11X100MM CFF33

## (undated) DEVICE — SU WND CLOSURE VLOC 180 ABS 2-0 8" VLOCA208L

## (undated) DEVICE — STPL SKIN 35W ROTATING HEAD PRW35

## (undated) DEVICE — DEVICE SUTURE GRASPER TROCAR CLOSURE 14GA PMITCSG

## (undated) DEVICE — SYR 50ML LL W/O NDL 309653

## (undated) DEVICE — TUBING SUCTION MEDI-VAC 1/4"X20' N620A

## (undated) DEVICE — SU SILK 2-0 TIE 12X30" A305H

## (undated) DEVICE — SU VICRYL 4-0 PS-1 18" UND J682G

## (undated) DEVICE — LINEN TOWEL PACK X5 5464

## (undated) DEVICE — PEN MARKING SKIN W/LABELS 31145918

## (undated) DEVICE — SPONGE LAP 4X18" X8415

## (undated) DEVICE — SU ETHILON 3-0 FS-1 18" 663G

## (undated) DEVICE — POSITIONER ARMBOARD FOAM 1PAIR LF FP-ARMB1

## (undated) DEVICE — DRAIN JACKSON PRATT RESERVOIR 100ML SU130-1305

## (undated) DEVICE — NDL COUNTER 20CT 31142493

## (undated) DEVICE — ENDO SCOPE WARMER LF TM500

## (undated) DEVICE — SOL WATER IRRIG 1000ML BOTTLE 2F7114

## (undated) DEVICE — SOL NACL 0.9% IRRIG 1000ML BOTTLE 2F7124

## (undated) DEVICE — ESU BLADE PEAK PLASMA 3.0S PS210-030S

## (undated) DEVICE — BNDG ELASTIC 6"X5YDS UNSTERILE 6611-60

## (undated) DEVICE — PACK SET-UP STD 9102

## (undated) DEVICE — DRSG TEGADERM 2 3/8X2 3/4" 1624W

## (undated) DEVICE — ESU HOLDER LAP INST DISP PURPLE LONG 330MM H-PRO-330

## (undated) DEVICE — EYE MARKING PAD 581057

## (undated) DEVICE — GLOVE PROTEXIS W/NEU-THERA 7.0  2D73TE70

## (undated) DEVICE — BLADE KNIFE SURG 15 371115

## (undated) DEVICE — DEVICE SUTURE PASSER 14GA WECK EFX EFXSP2

## (undated) DEVICE — DEVICE ENDO STITCH APPLIER 10MM 173016

## (undated) DEVICE — GLOVE PROTEXIS W/NEU-THERA 6.5  2D73TE65

## (undated) DEVICE — LIGHT HANDLE X2

## (undated) DEVICE — BNDG ELASTIC 6" DBL LENGTH UNSTERILE 6611-16

## (undated) DEVICE — SYR BULB IRRIG 50ML LATEX FREE 0035280

## (undated) DEVICE — SU VICRYL 3-0 FS-1 27" J442H

## (undated) DEVICE — SU PROLENE 1 CTX 30" 8435H

## (undated) DEVICE — SU VICRYL 0 UR-6 27" J603H

## (undated) DEVICE — WIPES FOLEY CARE SURESTEP PROVON DFC100

## (undated) DEVICE — GLOVE PROTEXIS BLUE W/NEU-THERA 7.0  2D73EB70

## (undated) DEVICE — PAD CHUX UNDERPAD 23X24" 7136

## (undated) DEVICE — SUCTION IRR STRYKERFLOW II W/TIP 250-070-520

## (undated) DEVICE — SPECIMEN CONTAINER 5OZ STERILE 2600SA

## (undated) DEVICE — DRAIN JACKSON PRATT 15FR ROUND SU130-1323

## (undated) DEVICE — ESU PENCIL W/SMOKE EVAC NEPTUNE STRYKER 0703-046-000

## (undated) DEVICE — LINEN ORTHO PACK 5446

## (undated) DEVICE — DRAPE LAP TRANSVERSE 29421

## (undated) DEVICE — DECANTER TRANSFER DEVICE 2008S

## (undated) DEVICE — BLADE KNIFE SURG 10 371110

## (undated) DEVICE — PREP CHLORAPREP 26ML TINTED ORANGE  260815

## (undated) DEVICE — SUCTION MANIFOLD DORNOCH ULTRA CART UL-CL500

## (undated) DEVICE — SUCTION TIP POOLE K770

## (undated) DEVICE — BAG DRAIN URO FOR SIEMENS 8MM ADAPTER NS CC164NS-A

## (undated) DEVICE — SOL WATER IRRIG 3000ML BAG 2B7117

## (undated) DEVICE — DRSG STERI STRIP 1/4X3" R1541

## (undated) DEVICE — DRSG XEROFORM 5X9" 8884431605

## (undated) DEVICE — SUCTION MINISQUAIR SMOKE EVAC CAPTURE DEVICE SQ20012-01

## (undated) DEVICE — DRSG ABDOMINAL 07 1/2X8" 7197D

## (undated) DEVICE — CLOSURE SYS SKIN PREMIERPRO EXOFINFUSION 4X60CM 3473

## (undated) DEVICE — PACK CYSTOSCOPY SBA15CYFSI

## (undated) DEVICE — DRSG KERLIX 4 1/2"X4YDS ROLL 6730

## (undated) DEVICE — CATH TRAY FOLEY SURESTEP 16FR WDRAIN BAG STLK LATEX A300316A

## (undated) DEVICE — PAD CHUX UNDERPAD 30X36" P3036C

## (undated) DEVICE — SURGICEL POWDER ABSORBABLE HEMOSTAT 3GM 3013SP

## (undated) DEVICE — SOL ADH LIQUID BENZOIN SWAB 0.6ML C1544

## (undated) DEVICE — SOL NACL 0.9% IRRIG 3000ML BAG 2B7477

## (undated) DEVICE — EVAC SYSTEM CLEAR FLOW SC082500

## (undated) DEVICE — APPLICATOR ENDOSCOPIC 5 SURGICEL POWDER 3123SPEA

## (undated) DEVICE — GLOVE PROTEXIS MICRO 6.5  2D73PM65

## (undated) DEVICE — SOL NACL 0.9% INJ 1000ML BAG 2B1324X

## (undated) DEVICE — DRSG COTTON BALL 6PK LCB62

## (undated) DEVICE — ESU LIGASURE SEALER/DIVIDER RETRACT L-HOOK 37CM LF5637

## (undated) DEVICE — TUBING IRRIG CYSTO/BLADDER SET 81" LF 2C4040

## (undated) RX ORDER — KETOROLAC TROMETHAMINE 30 MG/ML
INJECTION, SOLUTION INTRAMUSCULAR; INTRAVENOUS
Status: DISPENSED
Start: 2022-08-23

## (undated) RX ORDER — FENTANYL CITRATE 50 UG/ML
INJECTION, SOLUTION INTRAMUSCULAR; INTRAVENOUS
Status: DISPENSED
Start: 2022-08-23

## (undated) RX ORDER — PROPOFOL 10 MG/ML
INJECTION, EMULSION INTRAVENOUS
Status: DISPENSED
Start: 2022-08-23

## (undated) RX ORDER — CLINDAMYCIN PHOSPHATE 900 MG/50ML
INJECTION, SOLUTION INTRAVENOUS
Status: DISPENSED
Start: 2020-07-08

## (undated) RX ORDER — PHENAZOPYRIDINE HYDROCHLORIDE 200 MG/1
TABLET, FILM COATED ORAL
Status: DISPENSED
Start: 2022-08-23

## (undated) RX ORDER — GLYCOPYRROLATE 0.2 MG/ML
INJECTION, SOLUTION INTRAMUSCULAR; INTRAVENOUS
Status: DISPENSED
Start: 2022-08-23

## (undated) RX ORDER — CEFAZOLIN SODIUM/WATER 2 G/20 ML
SYRINGE (ML) INTRAVENOUS
Status: DISPENSED
Start: 2022-08-23

## (undated) RX ORDER — HYDROMORPHONE HYDROCHLORIDE 1 MG/ML
INJECTION, SOLUTION INTRAMUSCULAR; INTRAVENOUS; SUBCUTANEOUS
Status: DISPENSED
Start: 2022-08-23

## (undated) RX ORDER — FENTANYL CITRATE 50 UG/ML
INJECTION, SOLUTION INTRAMUSCULAR; INTRAVENOUS
Status: DISPENSED
Start: 2020-07-08

## (undated) RX ORDER — ONDANSETRON 2 MG/ML
INJECTION INTRAMUSCULAR; INTRAVENOUS
Status: DISPENSED
Start: 2020-07-08

## (undated) RX ORDER — ONDANSETRON 2 MG/ML
INJECTION INTRAMUSCULAR; INTRAVENOUS
Status: DISPENSED
Start: 2022-08-23

## (undated) RX ORDER — HYDROXYZINE HYDROCHLORIDE 25 MG/1
TABLET, FILM COATED ORAL
Status: DISPENSED
Start: 2022-08-23

## (undated) RX ORDER — DEXAMETHASONE SODIUM PHOSPHATE 4 MG/ML
INJECTION, SOLUTION INTRA-ARTICULAR; INTRALESIONAL; INTRAMUSCULAR; INTRAVENOUS; SOFT TISSUE
Status: DISPENSED
Start: 2022-08-23

## (undated) RX ORDER — OXYCODONE HYDROCHLORIDE 5 MG/1
TABLET ORAL
Status: DISPENSED
Start: 2022-08-23

## (undated) RX ORDER — ACETAMINOPHEN 325 MG/1
TABLET ORAL
Status: DISPENSED
Start: 2020-07-09

## (undated) RX ORDER — LIDOCAINE HYDROCHLORIDE 20 MG/ML
INJECTION, SOLUTION EPIDURAL; INFILTRATION; INTRACAUDAL; PERINEURAL
Status: DISPENSED
Start: 2022-08-23

## (undated) RX ORDER — ACETAMINOPHEN 325 MG/1
TABLET ORAL
Status: DISPENSED
Start: 2022-08-23

## (undated) RX ORDER — FENTANYL CITRATE 0.05 MG/ML
INJECTION, SOLUTION INTRAMUSCULAR; INTRAVENOUS
Status: DISPENSED
Start: 2022-08-23

## (undated) RX ORDER — LIDOCAINE HYDROCHLORIDE AND EPINEPHRINE 10; 10 MG/ML; UG/ML
INJECTION, SOLUTION INFILTRATION; PERINEURAL
Status: DISPENSED
Start: 2020-07-08

## (undated) RX ORDER — CEFAZOLIN SODIUM 1 G/3ML
INJECTION, POWDER, FOR SOLUTION INTRAMUSCULAR; INTRAVENOUS
Status: DISPENSED
Start: 2020-07-08

## (undated) RX ORDER — HYDROMORPHONE HYDROCHLORIDE 1 MG/ML
INJECTION, SOLUTION INTRAMUSCULAR; INTRAVENOUS; SUBCUTANEOUS
Status: DISPENSED
Start: 2020-07-08

## (undated) RX ORDER — BUPIVACAINE HYDROCHLORIDE 5 MG/ML
INJECTION, SOLUTION EPIDURAL; INTRACAUDAL
Status: DISPENSED
Start: 2020-07-08

## (undated) RX ORDER — OXYCODONE HYDROCHLORIDE 5 MG/1
TABLET ORAL
Status: DISPENSED
Start: 2020-07-08